# Patient Record
Sex: MALE | Race: WHITE | NOT HISPANIC OR LATINO | Employment: OTHER | URBAN - METROPOLITAN AREA
[De-identification: names, ages, dates, MRNs, and addresses within clinical notes are randomized per-mention and may not be internally consistent; named-entity substitution may affect disease eponyms.]

---

## 2017-10-06 ENCOUNTER — TRANSCRIBE ORDERS (OUTPATIENT)
Dept: ADMINISTRATIVE | Facility: HOSPITAL | Age: 57
End: 2017-10-06

## 2017-10-06 DIAGNOSIS — Z87.891 PERSONAL HISTORY OF TOBACCO USE, PRESENTING HAZARDS TO HEALTH: Primary | ICD-10-CM

## 2017-10-06 DIAGNOSIS — R19.09 GROIN MASS: Primary | ICD-10-CM

## 2017-10-16 ENCOUNTER — HOSPITAL ENCOUNTER (OUTPATIENT)
Dept: ULTRASOUND IMAGING | Facility: HOSPITAL | Age: 57
Discharge: HOME | End: 2017-10-16
Admitting: NURSE PRACTITIONER

## 2017-10-16 ENCOUNTER — HOSPITAL ENCOUNTER (OUTPATIENT)
Dept: CT IMAGING | Facility: HOSPITAL | Age: 57
Discharge: HOME | End: 2017-10-16

## 2017-10-16 DIAGNOSIS — Z87.891 PERSONAL HISTORY OF TOBACCO USE, PRESENTING HAZARDS TO HEALTH: ICD-10-CM

## 2017-10-16 DIAGNOSIS — R19.09 GROIN MASS: ICD-10-CM

## 2017-10-16 PROCEDURE — G0297 LDCT FOR LUNG CA SCREEN: HCPCS

## 2017-10-16 PROCEDURE — 76882 US LMTD JT/FCL EVL NVASC XTR: CPT

## 2017-12-14 ENCOUNTER — APPOINTMENT (OUTPATIENT)
Dept: PREADMISSION TESTING | Facility: HOSPITAL | Age: 57
End: 2017-12-14

## 2017-12-14 LAB
ANION GAP SERPL CALCULATED.3IONS-SCNC: 3 MMOL/L (ref 3–11)
BUN BLD-MCNC: 14 MG/DL (ref 9–23)
BUN/CREAT SERPL: 17.5 (ref 7–25)
CALCIUM SPEC-SCNC: 8.9 MG/DL (ref 8.7–10.4)
CHLORIDE SERPL-SCNC: 102 MMOL/L (ref 99–109)
CO2 SERPL-SCNC: 31 MMOL/L (ref 20–31)
CREAT BLD-MCNC: 0.8 MG/DL (ref 0.6–1.3)
DEPRECATED RDW RBC AUTO: 42.3 FL (ref 37–54)
ERYTHROCYTE [DISTWIDTH] IN BLOOD BY AUTOMATED COUNT: 13.7 % (ref 11.3–14.5)
GFR SERPL CREATININE-BSD FRML MDRD: 100 ML/MIN/1.73
GLUCOSE BLD-MCNC: 162 MG/DL (ref 70–100)
HCT VFR BLD AUTO: 42.1 % (ref 38.9–50.9)
HGB BLD-MCNC: 14.9 G/DL (ref 13.1–17.5)
MCH RBC QN AUTO: 30.4 PG (ref 27–31)
MCHC RBC AUTO-ENTMCNC: 35.4 G/DL (ref 32–36)
MCV RBC AUTO: 85.9 FL (ref 80–99)
PLATELET # BLD AUTO: 142 10*3/MM3 (ref 150–450)
PMV BLD AUTO: 10.6 FL (ref 6–12)
POTASSIUM BLD-SCNC: 4.2 MMOL/L (ref 3.5–5.5)
RBC # BLD AUTO: 4.9 10*6/MM3 (ref 4.2–5.76)
SODIUM BLD-SCNC: 136 MMOL/L (ref 132–146)
WBC NRBC COR # BLD: 8.23 10*3/MM3 (ref 3.5–10.8)

## 2017-12-14 PROCEDURE — 80048 BASIC METABOLIC PNL TOTAL CA: CPT | Performed by: SURGERY

## 2017-12-14 PROCEDURE — 93005 ELECTROCARDIOGRAM TRACING: CPT

## 2017-12-14 PROCEDURE — 36415 COLL VENOUS BLD VENIPUNCTURE: CPT

## 2017-12-14 PROCEDURE — 85027 COMPLETE CBC AUTOMATED: CPT | Performed by: SURGERY

## 2017-12-14 PROCEDURE — 93010 ELECTROCARDIOGRAM REPORT: CPT | Performed by: INTERNAL MEDICINE

## 2017-12-18 ENCOUNTER — LAB REQUISITION (OUTPATIENT)
Dept: LAB | Facility: HOSPITAL | Age: 57
End: 2017-12-18

## 2017-12-18 DIAGNOSIS — R22.42 LOCALIZED SWELLING, MASS AND LUMP, LEFT LOWER LIMB: ICD-10-CM

## 2017-12-18 PROCEDURE — 88341 IMHCHEM/IMCYTCHM EA ADD ANTB: CPT | Performed by: SURGERY

## 2017-12-18 PROCEDURE — 88304 TISSUE EXAM BY PATHOLOGIST: CPT | Performed by: SURGERY

## 2017-12-18 PROCEDURE — 88341 IMHCHEM/IMCYTCHM EA ADD ANTB: CPT

## 2017-12-18 PROCEDURE — 88342 IMHCHEM/IMCYTCHM 1ST ANTB: CPT

## 2017-12-18 PROCEDURE — 88342 IMHCHEM/IMCYTCHM 1ST ANTB: CPT | Performed by: SURGERY

## 2017-12-18 PROCEDURE — 88321 CONSLTJ&REPRT SLD PREP ELSWR: CPT

## 2017-12-18 PROCEDURE — 88323 CONSLTJ&REPRT MATRL PREP SLD: CPT

## 2017-12-29 PROCEDURE — 88321 CONSLTJ&REPRT SLD PREP ELSWR: CPT

## 2018-01-08 LAB
LAB AP CASE REPORT: NORMAL
LAB AP CLINICAL INFORMATION: NORMAL
Lab: NORMAL
PATH REPORT.FINAL DX SPEC: NORMAL
PATH REPORT.GROSS SPEC: NORMAL

## 2018-01-11 ENCOUNTER — TRANSCRIBE ORDERS (OUTPATIENT)
Dept: ADMINISTRATIVE | Facility: HOSPITAL | Age: 58
End: 2018-01-11

## 2018-01-11 DIAGNOSIS — C7B.1 MERKEL CELL CARCINOMA OF LYMPH NODE (HCC): Primary | ICD-10-CM

## 2018-01-11 DIAGNOSIS — C4A.9 MERKEL CELL CARCINOMA OF LYMPH NODE (HCC): Primary | ICD-10-CM

## 2018-01-17 ENCOUNTER — HOSPITAL ENCOUNTER (OUTPATIENT)
Dept: PET IMAGING | Facility: HOSPITAL | Age: 58
Discharge: HOME | End: 2018-01-17
Attending: SURGERY

## 2018-01-17 ENCOUNTER — HOSPITAL ENCOUNTER (OUTPATIENT)
Dept: PET IMAGING | Facility: HOSPITAL | Age: 58
Discharge: HOME | End: 2018-01-17
Attending: SURGERY | Admitting: SURGERY

## 2018-01-17 DIAGNOSIS — C4A.9 MERKEL CELL CARCINOMA OF LYMPH NODE (HCC): ICD-10-CM

## 2018-01-17 DIAGNOSIS — C7B.1 MERKEL CELL CARCINOMA OF LYMPH NODE (HCC): ICD-10-CM

## 2018-01-17 LAB — GLUCOSE BLDC GLUCOMTR-MCNC: 189 MG/DL (ref 70–130)

## 2018-01-17 PROCEDURE — 78816 PET IMAGE W/CT FULL BODY: CPT

## 2018-01-17 PROCEDURE — 0 FLUDEOXYGLUCOSE F18 SOLUTION: Performed by: SURGERY

## 2018-01-17 PROCEDURE — 82962 GLUCOSE BLOOD TEST: CPT

## 2018-01-17 PROCEDURE — A9552 F18 FDG: HCPCS | Performed by: SURGERY

## 2018-01-17 RX ADMIN — FLUDEOXYGLUCOSE F18 1 DOSE: 300 INJECTION INTRAVENOUS at 13:21

## 2018-01-24 ENCOUNTER — TRANSCRIBE ORDERS (OUTPATIENT)
Dept: ADMINISTRATIVE | Facility: HOSPITAL | Age: 58
End: 2018-01-24

## 2018-01-24 DIAGNOSIS — E05.90 HYPERTHYROIDISM: Primary | ICD-10-CM

## 2018-01-29 ENCOUNTER — HOSPITAL ENCOUNTER (OUTPATIENT)
Dept: ULTRASOUND IMAGING | Facility: HOSPITAL | Age: 58
Discharge: HOME | End: 2018-01-29
Attending: SURGERY | Admitting: SURGERY

## 2018-01-29 DIAGNOSIS — E05.90 HYPERTHYROIDISM: ICD-10-CM

## 2018-01-29 PROCEDURE — 76536 US EXAM OF HEAD AND NECK: CPT

## 2018-02-06 ENCOUNTER — EDUCATION (OUTPATIENT)
Dept: ONCOLOGY | Facility: HOSPITAL | Age: 58
End: 2018-02-06

## 2018-02-06 ENCOUNTER — CONSULT (OUTPATIENT)
Dept: ONCOLOGY | Facility: CLINIC | Age: 58
End: 2018-02-06

## 2018-02-06 VITALS
SYSTOLIC BLOOD PRESSURE: 125 MMHG | RESPIRATION RATE: 16 BRPM | OXYGEN SATURATION: 98 % | DIASTOLIC BLOOD PRESSURE: 71 MMHG | HEART RATE: 95 BPM | WEIGHT: 252.4 LBS | TEMPERATURE: 97.2 F | BODY MASS INDEX: 35.34 KG/M2 | HEIGHT: 71 IN

## 2018-02-06 DIAGNOSIS — C4A.9 MERKEL CELL CARCINOMA (HCC): Primary | ICD-10-CM

## 2018-02-06 PROCEDURE — 99205 OFFICE O/P NEW HI 60 MIN: CPT | Performed by: INTERNAL MEDICINE

## 2018-02-06 RX ORDER — AMLODIPINE BESYLATE AND BENAZEPRIL HYDROCHLORIDE 10; 40 MG/1; MG/1
1 CAPSULE ORAL DAILY
COMMUNITY

## 2018-02-06 RX ORDER — HYDROCHLOROTHIAZIDE 50 MG/1
50 TABLET ORAL DAILY
COMMUNITY
End: 2018-09-10

## 2018-02-06 RX ORDER — ONDANSETRON HYDROCHLORIDE 8 MG/1
8 TABLET, FILM COATED ORAL 3 TIMES DAILY PRN
Qty: 30 TABLET | Refills: 5 | Status: SHIPPED | OUTPATIENT
Start: 2018-02-06

## 2018-02-06 RX ORDER — MELATONIN
1000 DAILY
COMMUNITY

## 2018-02-06 RX ORDER — CLONAZEPAM 1 MG/1
1 TABLET ORAL 2 TIMES DAILY PRN
COMMUNITY

## 2018-02-06 RX ORDER — ATORVASTATIN CALCIUM 40 MG/1
40 TABLET, FILM COATED ORAL DAILY
COMMUNITY

## 2018-02-06 RX ORDER — DEXAMETHASONE 4 MG/1
TABLET ORAL
Qty: 30 TABLET | Refills: 3 | Status: SHIPPED | OUTPATIENT
Start: 2018-02-06 | End: 2018-05-21

## 2018-02-06 NOTE — PROGRESS NOTES
DATE OF CONSULTATION: 2/6/2018    REFERRING PHYSICIAN: Matthew Sanchez MD    Dear Dr. Matthew Sanchez MD  Thank you for asking for my medical advice on this patient. I saw him in the  [facility] office on 2/6/2018    REASON FOR CONSULTATION: New diagnosis of Merkel cell carcinoma.     HISTORY OF PRESENT ILLNESS: The patient is a very pleasant 57 y.o.  male   who was in his usual state of health until approximately 6 months ago. The patient presented developed a mass to his left groin that increased in size from August 2017. He saw Dr. Sanchez who was concerned about the presence of lymphadenopathy. He referred the patient to Dr. Arrieta who completed excision on 12/18/2017. Final pathology revealed Merkel cell carcinoma. The patient had full body skin exam with Dr. Colmenares that failed to show any primary lesions. He had whole body PET scan completed 1/17/2018 that failed to show evidence of metastatic disease. There was some hypermetabolic activity in his thyroid, however follow up ultrasound showed benign appearing nodularity. Aside from his left groin mass, the patient has been asymptomatic. He denies new pain, skin changes, or weight loss. Based on his new diagnosis of Merkel cell carcinoma, he was referred to our office for further evaluation.     SUBJECTIVE: When I saw the patient today he is doing fairly well. He complains of some intermittent and  Mild lymphedema since undergoing his surgery in December. His incision has healed well. He is eating and drinking well. He denies new cough or shortness of breath. He is somewhat anxious regarding his new diagnosis of cancer.     Review of Systems   Constitutional: Negative for activity change, appetite change, chills, fatigue, fever and unexpected weight change.   HENT: Negative for hearing loss, mouth sores, nosebleeds, sore throat and trouble swallowing.    Eyes: Negative for visual disturbance.   Respiratory: Negative for cough, chest tightness,  shortness of breath and wheezing.    Cardiovascular: Positive for leg swelling. Negative for chest pain and palpitations.        Lymphedema to left leg   Gastrointestinal: Negative for abdominal distention, abdominal pain, blood in stool, constipation, diarrhea, nausea, rectal pain and vomiting.   Endocrine: Negative for cold intolerance and heat intolerance.   Genitourinary: Negative for difficulty urinating, dysuria, frequency and urgency.   Musculoskeletal: Positive for back pain. Negative for arthralgias, gait problem, joint swelling and myalgias.   Skin: Negative for rash.   Neurological: Negative for dizziness, tremors, syncope, weakness, light-headedness, numbness and headaches.   Hematological: Negative for adenopathy. Does not bruise/bleed easily.   Psychiatric/Behavioral: Negative for confusion, sleep disturbance and suicidal ideas. The patient is not nervous/anxious.        No past medical history on file.    Social History     Social History   • Marital status: Single     Spouse name: N/A   • Number of children: N/A   • Years of education: N/A     Occupational History   • Not on file.     Social History Main Topics   • Smoking status: Not on file   • Smokeless tobacco: Not on file   • Alcohol use Not on file   • Drug use: Not on file   • Sexual activity: Not on file     Other Topics Concern   • Not on file     Social History Narrative       No family history on file.    No past surgical history on file.    Allergies not on file       Current Outpatient Prescriptions:   •  oxyCODONE-acetaminophen (PERCOCET) 5-325 MG per tablet, Take 1-2 tablets by mouth Every 4 to 6 Hours As Needed., Disp: 20 tablet, Rfl: 0    PHYSICAL EXAMINATION:   There were no vitals taken for this visit.   ECOG Performance Status: 1 - Symptomatic but completely ambulatory  General Appearance:  alert, cooperative, no apparent distress and appears stated age   Neurologic/Psychiatric: A&O x 3, gait steady, appropriate affect, strength  5/5 in all muscle groups   HEENT:  Normocephalic, without obvious abnormality, mucous membranes moist   Neck: Supple, symmetrical, trachea midline, no adenopathy;  No thyromegaly, masses, or tenderness   Lungs:   Clear to auscultation bilaterally; respirations regular, even, and unlabored bilaterally   Heart:  Regular rate and rhythm, no murmurs appreciated   Abdomen:   Soft, non-tender, non-distended and no organomegaly   Lymph nodes: No cervical, supraclavicular, inguinal or axillary adenopathy noted   Extremities: Normal, atraumatic; no clubbing, cyanosis, or edema    Skin: No rashes, ulcers, or suspicious lesions noted       No visits with results within 2 Week(s) from this visit.  Latest known visit with results is:    Hospital Outpatient Visit on 01/17/2018   Component Date Value Ref Range Status   • Glucose 01/17/2018 189* 70 - 130 mg/dL Final        Us Thyroid    Result Date: 1/31/2018  Narrative: EXAMINATION: US THYROID-  INDICATION: Hyperthyroidism; E05.90-Thyrotoxicosis, unspecified without thyrotoxic crisis or storm.  COMPARISON: 03/02/2012 thyroid ultrasound and earlier ultrasounds. Whole body PET scan 01/17/2018.  FINDINGS: Patient history indicates recent diagnosis of Merkel cell cancer diagnosed via left groin lymph node biopsy.  Thyroid is diffusely enlarged, and consists of multiple fairly bland and uniform nodular areas, with no evidence of microcalcification, or significant color Doppler flow. There is a very dense large dorsal midpole calcification the right, 12 mm in diameter.  Overall dimensions of the thyroid are approximate 7.4 x 2.9 x 3.8 cm on the right, 7.4 x 3.4 x 3.5 cm on the left, and enlargement of the thyroid isthmus to 12 mm in width.  Essentially all portions of the thyroid appear nodular, similar to the 2012 exam, with no particular area appearing distinct or unusual compared to the remainder of the gland. The sonographer notes the largest nodular area on the right, is roughly  4.3 x 2.4 x 3.2 cm and the largest nodular area on the left in the lower pole, roughly 4.6 x 3.3 x 2.6 cm.  Dimensions of the thyroid are similar to the prior exam, the right thyroid lobe measuring 7.6 x 2.8 x 3.4 cm in 2012, and the left lobe measuring 7.7 x 3.4 x 2.3 cm at that time. Pattern of overall nodularity is similar to today's exam, although the individual nodular areas are less well seen in 2012 due to previous degeneration ultrasound equipment.      Impression: Markedly enlarged and diffusely nodular thyroid gland, similar to previous 03/02/2012 exam. Nodular areas are fairly uniform and bland, with no particular asymmetric or unusual nodule. Please see above discussion.   D:  01/30/2018 E:  01/31/2018  This report was finalized on 1/31/2018 9:54 PM by DR. Mushtaq Uriarte MD.      Nm Pet Whole Body    Result Date: 1/17/2018  Narrative: EXAMINATION: NM PET, WHOLE BODY-01/17/2018:  INDICATION: C7B.1  MERKEL CELL CARCINOMA; C7B.1-Secondary Merkel cell carcinoma; C4A.9-Merkel cell carcinoma, unspecified, status post malignant node removed from the left groin which is positive for Merkel cell malignancy, staging.  TECHNIQUE:  With a fasting baseline blood glucose level of 189, 12.27 mCi of 18 FDG was administered intravenously.  Approximately one hour after the administration of radiotracer, a total body fusion PET CT data set was performed imaging the patient from the vertex of the skull to the feet.  The radiation dose reduction device was turned on as low as reasonably achievable for each scan per ALARA protocol.  COMPARISON:  No comparison images are available.  FINDINGS: 1. There is a small hematoma in the area of the patient's recent left inguinal node resection. Surgical clips are noted around the area of node resection. There is no evidence of hypermetabolic activity at the surgical site in the left groin.  Further, small normal lymph nodes are seen in both the left and right groin areas, none of which  exhibit hypermetabolic activity.  2. The thyroid gland is diffusely enlarged and is inhomogeneous in attenuation on the correlative CT data set. The thyroid lobes are diffusely hypermetabolic. The right lobe of the thyroid exhibits a maximum SUV average value of 4.44 and the left lobe of the thyroid demonstrates a maximal SUV value of 4.32. An outstanding dominant focally hot or FDG avid mass is not identified otherwise within the thyroid. There is no perithyroidal adenopathy or other hypermetabolic disease in the neck.  3. The neck, chest, mediastinum and lungs are normal. There is no hypermetabolic adenopathy or pulmonary nodules noted.  4. Data sets through the abdomen to include liver, retroperitoneum and retrocrural space, elizabeth pathways, mesentery and omentum are negative. The patient does have bilateral adrenal hyperplasia, however, there is no pathologic hypermetabolic activity in the mildly enlarged symmetrical adrenal glands.  5. Images through the lower abdomen, pelvis, inguinal areas, deep internal iliac chains and lower extremities to the feet are negative. A dominant focus of hypermetabolic activity is not currently identified.      Impression: 1.  The thyroid is diffusely enlarged and is moderately hypermetabolic diffusely without a focal dominant area. Nonetheless, this is considerable thyromegaly. 2.  Otherwise, the PET CT data sets are negative elsewhere. There is no pathologic adenopathy, nodule within the chest, evidence of adenopathy or retroperitoneal mass in the abdomen, pelvis or retroperitoneum, and there is no evidence of residual hypermetabolic abnormality in the area of the recent inguinal node resection. 3.  Therefore, there is hypermetabolic activity diffusely in the enlarged thyroid but no pathologic focus of hypermetabolic activity is seen elsewhere.  D:  01/17/2018 E:  01/17/2018     This report was finalized on 1/17/2018 5:04 PM by Dr. Robert Sal MD.          DIAGNOSTIC DATA:    1. Radiology: 1/17/2018 PET scan impression:  1.  The thyroid is diffusely enlarged and is moderately hypermetabolic diffusely without a focal dominant area. Nonetheless, this is considerable thyromegaly. 2.  Otherwise, the PET CT data sets are negative elsewhere. There is no pathologic adenopathy, nodule within the chest, evidence of adenopathy or retroperitoneal mass in the abdomen, pelvis or retroperitoneum, and there is no evidence of residual hypermetabolic abnormality in the area of the recent inguinal node resection. 3.  Therefore, there is hypermetabolic activity diffusely in the enlarged thyroid but no pathologic focus of hypermetabolic activity is seen elsewhere.  1/24/2018 Thyroid ultrasound impression: Markedly enlarged and diffusely nodular thyroid gland, similar to previous 03/02/2012 exam. Nodular areas are fairly uniform  and bland, with no particular asymmetric or unusual nodule.  2. Dr. Arrieta's note from 1/23/2018 reviewed by me and documented in the  chart.   3. Pathology report: 12/18/2017- left thigh mass excision- Merkel cell carcinoma.   4. Laboratory data: 12/14/2017 WBC 8.23, Hgb 14.9, Hct 42.1, Plt 142, Crt 0.8, BUN 14, K+ 4.2, Na+ 136, calcium 8.9.      ASSESSMENT: The patient is a very pleasant 57 y.o.  male  with Merkel cell carcinoma.     PROBLEM LIST:   1. Merkel cell carcinoma.   A. Presented with left groin mass.   B. Status post left groin excision of 5 cm mass.   C. Final pathology revealed Merkel cell carcinoma.   D. PET scan for clinical staging done 1/17/2018 failed to show evidence of metastatic disease.   E. TxN1M0 disease.   2. History of basal cell carcinoma of the skin.   3. Type 2 diabetes.   4. Hypertension.    PLAN:   1. I had a long discussion today with the patient and his sister about his  new diagnosis of Merkel cell carcinoma. I did go over the final pathology report in  detail with both of them. I reviewed the patient's documents including referring provider's  notes, lab results, imaging, and path report.   2. I reviewed with him the NCCN guidelines that show some added benefit to completing adjuvant chemotherapy and radiation. The patient is young, has good performance status, and had a large mass at presentation, and therefore I think it would benefit him to be aggressive in his treatment.   3. I will refer him to Dr. Gay Carlos for consultation regarding adjuvant radiation herapy. I have discussed his case over the phone with her in detail for treatment planning.  This will be consolidative short course after completion of chemotherapy.  This is in compliance with NCCN guidelines.  4. Regarding chemotherapy, I recommended the patient be treated using cisplatin/etoposide given IV every 3 weeks for 4 cycles.  This is in compliance with NCCN guidelines.  5. I reviewed the potential side effects of this treatment including hair loss, nausea, vomiting, fatigue, bone marrow suppression, infection, allergic reaction to his regimen, and even death. The patient received formal education from our oncology pharmacist regarding his treatment today.   6. He was given a prescription for Decadron to be taken with each cycle of chemotherapy.   7. He was also given a prescription for Zofran to be used as needed for chemotherapy induced nausea.   8. We will consider adding Neulasta in the future if needed for significant neutropenia.   9. We will plan to start treatment in 1 week, and will see him back for cycle #2 of treatment.   10. The patient will continue surgical follow up with Dr. Arrieta for left groin excision.   11.  I will monitor patient blood pressure while he is on chemotherapy since it might fluctuate.  12.  I will monitor patient blood sugars while he is on chemotherapy since he would be receiving steroids as part of his antiemetic treatment.  13.  Future cancer treatment options would include immunotherapy.  Scribed for Anastasia Waller MD by RITA Beard.  2/6/2018  3:08 PM  Anastasia Waller MD  2/6/2018   I, Anasatsia Waller MD, personally performed the services described in this documentation as scribed by the above named individual in my presence, and it is both accurate and complete.  2/6/2018  4:04 PM

## 2018-02-06 NOTE — PLAN OF CARE
Outpatient Infusion • [address] [facility] • [address] • [place] [id number] • [phone]      CHEMOTHERAPY EDUCATION SHEET    NAME:  Regino Souza      : 1960           DATE: 18    Booklets Given: Chemotherapy and You [x]  Eating Hints [x]    Sexuality/Fertility Books []     Chemotherapy/Biotherapy Education Sheets: (list all that apply)  Cisplatin and Etoposide                                                                                                                                                                Chemotherapy Regimen:  Cisplatin and Etoposide infusions, days 1-3, every 21 days    TOPICS EDUCATION PROVIDED EDUCATION REINFORCED COMMENTS   ANEMIA:  role of RBC, cause, s/s, ways to manage, role of transfusion [x] [] Explained the MOA of chemo induced anemia and the signs and symptoms to be aware of.  Also encouraged patient to rest and ensure appropriate caloric intake to maintain energy.   THROMBOCYTOPENIA:  role of platelet, cause, s/s, ways to prevent bleeding, things to avoid, when to seek help [x] [] Explained the MOA of chemo induced thrombocytopenia and the signs and symptoms to be aware of.   NEUTROPENIA:  role of WBC, cause, infection precautions, s/s of infection, when to call MD [] [] Explained the MOA of chemo induced neutropenia, why it is a concern, and what symptoms to look for.  Instructed patient to call seek medical attention for a fever of 100.4F of over an hour.   NUTRITION & APPETITE CHANGES:  importance of maintaining healthy diet & weight, ways to manage to improve intake, dietary consult, exercise regimen [x] [] Emphasized importance of good nutrition and maintaining appropriate caloric intake.   DIARRHEA:  causes, s/s of dehydration, ways to manage, dietary changes, when to call MD [x] [] Explained risk of diarrhea and when to call PCP if it is uncontrolled.   CONSTIPATION:  causes, ways to manage, dietary changes, when to call MD [x] [] Advised pt  on the potential for constipation and importance of staying hydrated to help avoid.   NAUSEA & VOMITING:  cause, use of antiemetics, dietary changes, when to call MD [x] [] Alerted pt to emetogenic risk of chemo and advised patient to take the antiemetic medications that will be provided.   MOUTH SORES:  causes, oral care, ways to manage [x] [] Advised on the potential for mouth sores and ways to prevent (avoiding sharp foods and a water, baking soda, and salt mouthwash).   ALOPECIA:  cause, ways to manage, resources [x] [] Explained the risk for alopecia and what to expect.   INFERTILITY & SEXUALITY:  causes, fertility preservation options, sexuality changes, ways to manage, importance of birth control [x] [] Explained the importance for barrier contraception if engaging in intercourse.   NERVOUS SYSTEM CHANGES:  causes, s/s, neuropathies, cognitive changes, ways to manage [] []    PAIN:  causes, ways to manage [] [] ????   SKIN & NAIL CHANGES:  cause, s/s, ways to manage [x] [] Advised patient on the potential for nail changes and to keep nails trimmed to avoid breakage.   ORGAN TOXICITIES:  cause, s/s, need for diagnostic tests, labs, when to notify MD [x] [] Outlined the risk for toxicities and the importance of staying well hydrated to help prevent kidney injury.   SURVIVORSHIP:  distress, distress assessment, secondary malignancies, early/late effects, follow-up, social issues, social support [] []    HOME CARE:  use of spill kits, storing of PO chemo, how to manage bodily fluids [] []    MISCELLANEOUS:  drug interactions, administration, vesicant, et [x] [] Outlined what to expect on day of infusion.     Referrals:        Notes:   Provided contact information and instructed patient to call with any questions

## 2018-02-09 ENCOUNTER — SPECIALTY PHARMACY (OUTPATIENT)
Dept: ONCOLOGY | Facility: HOSPITAL | Age: 58
End: 2018-02-09

## 2018-02-09 DIAGNOSIS — C4A.9 MERKEL CELL CARCINOMA (HCC): Primary | ICD-10-CM

## 2018-02-09 RX ORDER — ETOPOSIDE 50 MG/1
200 CAPSULE ORAL DAILY
Qty: 27 CAPSULE | Refills: 3 | Status: SHIPPED | OUTPATIENT
Start: 2018-02-09 | End: 2018-05-21

## 2018-02-09 NOTE — PROGRESS NOTES
Given IV etoposide shortage, plan to transition new starts to oral etoposide regimen. Given oral etoposide bioavailability and reports in the literature Zaheer DH, Gloria CHRIS, Sheldon JH, Ezekiel GH, Alem GJ, Ector J, et al. A randomized trial to compare intravenous and oral etoposide in combination with cisplatin for the treatment of small cell lung cancer. Cancer. 1991;67:245-9. Will plan to administer ORAL ETOPOSIDE 200mg/m2 on Day1-3 of each 21 day CYCLE. Given daily doses > 200 mg, will divide daily dose over two doses.      Submit script for ETOPOSIDE 450mg PO daily on Days 1-3 of each 21 day cycle - of note, will instruct patient to take medication as split dose Etoposide 200mg (4 capsules) PO QAM and (5 capsules) 250mg QPM on DAY 1-3 of 21 day cycle.    Patient has FEP insurance, thus required to fill through FEP program [phone]. However, upon calling to provide verbal order for URGENT etoposide. FEP program is out of stock and thus, given national shortage completing override such that script can be filled at outside pharmacy     2/12/18: Called and alerted patient that IV etoposide would be transitioned to ORAL etoposide, given processing clinic will be contacting patient to reschedule infusion appt to ensure oral etoposide if dispensed to patient to start with Cycle 1. Pt verbalized understanding.

## 2018-02-13 ENCOUNTER — APPOINTMENT (OUTPATIENT)
Dept: ONCOLOGY | Facility: HOSPITAL | Age: 58
End: 2018-02-13

## 2018-02-13 ENCOUNTER — SPECIALTY PHARMACY (OUTPATIENT)
Dept: ONCOLOGY | Facility: HOSPITAL | Age: 58
End: 2018-02-13

## 2018-02-13 DIAGNOSIS — C4A.9 MERKEL CELL CARCINOMA (HCC): ICD-10-CM

## 2018-02-13 RX ORDER — PALONOSETRON 0.05 MG/ML
0.25 INJECTION, SOLUTION INTRAVENOUS ONCE
Status: CANCELLED | OUTPATIENT
Start: 2018-02-13

## 2018-02-13 RX ORDER — SODIUM CHLORIDE 9 MG/ML
250 INJECTION, SOLUTION INTRAVENOUS ONCE
Status: CANCELLED | OUTPATIENT
Start: 2018-02-13

## 2018-02-13 NOTE — PROGRESS NOTES
Oral Chemotherapy Teaching      Patient Name/:  Regino Souza   1960  Oral Chemotherapy Regimen: Etoposide 200mg PO QAM + 250mg PO QPM on Days 1-3 + IV Carboplatin on Day 1 of 21 day treatment cycle  Date Started Medication:   Cycle 1: 2/15/18    Initial Teaching Follow Up Comments     Safety     Storage instructions (away from children; away from heat/cold, sunlight, or moisture), handling - use of gloves (caregivers), washing hands after touching pills, managing waste     “How are you storing your medications?”, reminders on storage, proper handling (caregivers using gloves, washing hands, away from children, managing waste, etc.), disposal of medication with D/C or dosage change    Discussed appropriate storage and handling of hazardous medication.  Wash hands after handling.      Adherence      patient and/or caregiver on how to take medication, take with/without food, assess their adherence potential, stress importance of adherence, ways to manage adherence (pill boxes, phone reminders, calendars), what to do if miss a dose   “How are you taking your medication?” “How are you remembering to take your medication?”, “How many doses have you missed?”, determine reasons for non-adherence (not remembering, side effects, etc), ways to improve, overadherence? Remind patient of ways to improve/maintain adherence   Etoposide to be administered on an empty stomach (no food 2 hours prior or 1 hour after administration). Plan to take oral etoposide on Day 1-3 of each cycle. Pt aware to split dose - Administer Etoposide 200mg (4 caps) by mouth in the morning and 250mg (5 caps) po in the evening. Discussed use of zofran 30 minutes prior to etoposide administration. Pt aware. Medication processed at [facility]. Set up for delivery on 18. Pt aware to take first dose in AM on 2/15/18. Will plan to discuss Dexamethasone and Zofran use with patient in infusion room on 2/15/18 with first infusion.      Side  Effects/Adverse Reactions      patient on potential side effects, s/s, ways to manage, when to call MD/seek help     Determine if patient experiencing side effects, ways to manage  Plan to further review side effects in infusion, including but not limited to: myelosuppression, mucositis, N/V, alopecia, and rash.     Miscellaneous     Food interactions, DDIs, financial issues Determine if patient started any new medications since being placed on oral chemo (analyze for DDI) NO DDIs identified with active medication list and etoposide.      Additional Notes:Discussed aforementioned material with patient over the phone. Patient aware to call with additional questions or concerns. Scheduled for delivery of etoposide for 2/14/18. Will plan to further discuss administration, side effects, and storage/handling with patient on first day of infusion scheduled 2/15/18.

## 2018-02-14 ENCOUNTER — APPOINTMENT (OUTPATIENT)
Dept: ONCOLOGY | Facility: HOSPITAL | Age: 58
End: 2018-02-14

## 2018-02-15 ENCOUNTER — DOCUMENTATION (OUTPATIENT)
Dept: NUTRITION | Facility: HOSPITAL | Age: 58
End: 2018-02-15

## 2018-02-15 ENCOUNTER — DOCUMENTATION (OUTPATIENT)
Dept: SOCIAL WORK | Facility: HOSPITAL | Age: 58
End: 2018-02-15

## 2018-02-15 ENCOUNTER — SPECIALTY PHARMACY (OUTPATIENT)
Dept: ONCOLOGY | Facility: HOSPITAL | Age: 58
End: 2018-02-15

## 2018-02-15 ENCOUNTER — INFUSION (OUTPATIENT)
Dept: ONCOLOGY | Facility: HOSPITAL | Age: 58
End: 2018-02-15

## 2018-02-15 ENCOUNTER — DOCUMENTATION (OUTPATIENT)
Dept: ONCOLOGY | Facility: CLINIC | Age: 58
End: 2018-02-15

## 2018-02-15 ENCOUNTER — APPOINTMENT (OUTPATIENT)
Dept: ONCOLOGY | Facility: HOSPITAL | Age: 58
End: 2018-02-15

## 2018-02-15 VITALS
HEIGHT: 71 IN | HEART RATE: 101 BPM | BODY MASS INDEX: 34.44 KG/M2 | SYSTOLIC BLOOD PRESSURE: 124 MMHG | TEMPERATURE: 98 F | WEIGHT: 246 LBS | DIASTOLIC BLOOD PRESSURE: 69 MMHG | RESPIRATION RATE: 16 BRPM

## 2018-02-15 DIAGNOSIS — C4A.9 MERKEL CELL CARCINOMA (HCC): Primary | ICD-10-CM

## 2018-02-15 LAB
ALBUMIN SERPL-MCNC: 4.3 G/DL (ref 3.2–4.8)
ALBUMIN/GLOB SERPL: 1.7 G/DL (ref 1.5–2.5)
ALP SERPL-CCNC: 66 U/L (ref 25–100)
ALT SERPL W P-5'-P-CCNC: 18 U/L (ref 7–40)
ANION GAP SERPL CALCULATED.3IONS-SCNC: 8 MMOL/L (ref 3–11)
AST SERPL-CCNC: 14 U/L (ref 0–33)
BILIRUB SERPL-MCNC: 1.5 MG/DL (ref 0.3–1.2)
BUN BLD-MCNC: 15 MG/DL (ref 9–23)
BUN/CREAT SERPL: 18.8 (ref 7–25)
CALCIUM SPEC-SCNC: 8.9 MG/DL (ref 8.7–10.4)
CHLORIDE SERPL-SCNC: 99 MMOL/L (ref 99–109)
CO2 SERPL-SCNC: 30 MMOL/L (ref 20–31)
CREAT BLD-MCNC: 0.8 MG/DL (ref 0.6–1.3)
ERYTHROCYTE [DISTWIDTH] IN BLOOD BY AUTOMATED COUNT: 13.9 % (ref 11.3–14.5)
GFR SERPL CREATININE-BSD FRML MDRD: 100 ML/MIN/1.73
GLOBULIN UR ELPH-MCNC: 2.6 GM/DL
GLUCOSE BLD-MCNC: 189 MG/DL (ref 70–100)
HCT VFR BLD AUTO: 42.9 % (ref 38.9–50.9)
HGB BLD-MCNC: 14 G/DL (ref 13.1–17.5)
LYMPHOCYTES # BLD AUTO: 1.1 10*3/MM3 (ref 0.6–4.8)
LYMPHOCYTES NFR BLD AUTO: 10.9 % (ref 24–44)
MAGNESIUM SERPL-MCNC: 1.7 MG/DL (ref 1.3–2.7)
MCH RBC QN AUTO: 29.1 PG (ref 27–31)
MCHC RBC AUTO-ENTMCNC: 32.7 G/DL (ref 32–36)
MCV RBC AUTO: 89.1 FL (ref 80–99)
MONOCYTES # BLD AUTO: 0.3 10*3/MM3 (ref 0–1)
MONOCYTES NFR BLD AUTO: 3.3 % (ref 0–12)
NEUTROPHILS # BLD AUTO: 9 10*3/MM3 (ref 1.5–8.3)
NEUTROPHILS NFR BLD AUTO: 85.8 % (ref 41–71)
PLATELET # BLD AUTO: 175 10*3/MM3 (ref 150–450)
PMV BLD AUTO: 8.3 FL (ref 6–12)
POTASSIUM BLD-SCNC: 3.5 MMOL/L (ref 3.5–5.5)
PROT SERPL-MCNC: 6.9 G/DL (ref 5.7–8.2)
RBC # BLD AUTO: 4.81 10*6/MM3 (ref 4.2–5.76)
SODIUM BLD-SCNC: 137 MMOL/L (ref 132–146)
WBC NRBC COR # BLD: 10.5 10*3/MM3 (ref 3.5–10.8)

## 2018-02-15 PROCEDURE — 96375 TX/PRO/DX INJ NEW DRUG ADDON: CPT

## 2018-02-15 PROCEDURE — 96368 THER/DIAG CONCURRENT INF: CPT

## 2018-02-15 PROCEDURE — 96365 THER/PROPH/DIAG IV INF INIT: CPT

## 2018-02-15 PROCEDURE — 85025 COMPLETE CBC W/AUTO DIFF WBC: CPT

## 2018-02-15 PROCEDURE — 25010000002 DEXAMETHASONE PER 1 MG: Performed by: NURSE PRACTITIONER

## 2018-02-15 PROCEDURE — 96366 THER/PROPH/DIAG IV INF ADDON: CPT

## 2018-02-15 PROCEDURE — 80053 COMPREHEN METABOLIC PANEL: CPT

## 2018-02-15 PROCEDURE — 36415 COLL VENOUS BLD VENIPUNCTURE: CPT

## 2018-02-15 PROCEDURE — 25010000002 CISPLATIN PER 50 MG: Performed by: NURSE PRACTITIONER

## 2018-02-15 PROCEDURE — 96413 CHEMO IV INFUSION 1 HR: CPT

## 2018-02-15 PROCEDURE — 96374 THER/PROPH/DIAG INJ IV PUSH: CPT

## 2018-02-15 PROCEDURE — 96367 TX/PROPH/DG ADDL SEQ IV INF: CPT

## 2018-02-15 PROCEDURE — 25010000002 POTASSIUM CHLORIDE PER 2 MEQ OF POTASSIUM: Performed by: NURSE PRACTITIONER

## 2018-02-15 PROCEDURE — 25010000002 PALONOSETRON PER 25 MCG: Performed by: NURSE PRACTITIONER

## 2018-02-15 PROCEDURE — 25010000002 FOSAPREPITANT PER 1 MG: Performed by: NURSE PRACTITIONER

## 2018-02-15 PROCEDURE — 83735 ASSAY OF MAGNESIUM: CPT

## 2018-02-15 PROCEDURE — 25010000002 MAGNESIUM SULFATE PER 500 MG OF MAGNESIUM: Performed by: NURSE PRACTITIONER

## 2018-02-15 RX ORDER — PALONOSETRON 0.05 MG/ML
0.25 INJECTION, SOLUTION INTRAVENOUS ONCE
Status: COMPLETED | OUTPATIENT
Start: 2018-02-15 | End: 2018-02-15

## 2018-02-15 RX ORDER — SODIUM CHLORIDE 9 MG/ML
250 INJECTION, SOLUTION INTRAVENOUS ONCE
Status: COMPLETED | OUTPATIENT
Start: 2018-02-15 | End: 2018-02-15

## 2018-02-15 RX ADMIN — CISPLATIN 174 MG: 1 INJECTION, SOLUTION INTRAVENOUS at 13:26

## 2018-02-15 RX ADMIN — PALONOSETRON HYDROCHLORIDE 0.25 MG: 0.25 INJECTION INTRAVENOUS at 12:05

## 2018-02-15 RX ADMIN — POTASSIUM CHLORIDE 1000 ML: 2 INJECTION, SOLUTION, CONCENTRATE INTRAVENOUS at 10:02

## 2018-02-15 RX ADMIN — DEXAMETHASONE SODIUM PHOSPHATE 12 MG: 4 INJECTION, SOLUTION INTRAMUSCULAR; INTRAVENOUS at 12:06

## 2018-02-15 RX ADMIN — SODIUM CHLORIDE 250 ML: 9 INJECTION, SOLUTION INTRAVENOUS at 09:58

## 2018-02-15 RX ADMIN — POTASSIUM CHLORIDE 1000 ML: 2 INJECTION, SOLUTION, CONCENTRATE INTRAVENOUS at 14:33

## 2018-02-15 RX ADMIN — SODIUM CHLORIDE 150 MG: 9 INJECTION, SOLUTION INTRAVENOUS at 12:13

## 2018-02-15 NOTE — PROGRESS NOTES
SW met with pt during his first infusion to provide support and resources.  Pt states that he is doing well today and everything has gone well up to this point.  SW provided support to pt and educated him on the role of the oncology social worker.  SW provided contact information to pt and encouraged him to call with any future needs or concerns.  SW available for ongoing support and resource needs.

## 2018-02-15 NOTE — PROGRESS NOTES
Oncology Nutrition Screening    Patient Name:  Regino Souza  YOB: 1960  MRN: [id number]  Date:  02/15/18  Physician:  Dr. Waller    Type of Cancer Treatment:   Radiation/Cyberknife: consult pending  Chemotherapy:  Cisplatin / Etoposide (po D1-3) - every 21 days x 4 cycles    Patient Active Problem List   Diagnosis   • Merkel cell carcinoma       Current Outpatient Prescriptions   Medication Sig Dispense Refill   • amLODIPine-benazepril (LOTREL) 10-40 MG per capsule Take 1 capsule by mouth Daily.     • aspirin 81 MG tablet Take 81 mg by mouth Daily.     • atorvastatin (LIPITOR) 40 MG tablet Take 40 mg by mouth Daily.     • cholecalciferol (VITAMIN D3) 1000 units tablet Take 1,000 Units by mouth Daily.     • clonazePAM (KlonoPIN) 1 MG tablet Take 1 mg by mouth 2 (Two) Times a Day As Needed for Anxiety or Seizures.     • dexamethasone (DECADRON) 4 MG tablet Take 2 tablets in the morning the day after chemo (Day 2), then take 2 tablets twice daily on days 3 & 4.  Take with food. 30 tablet 3   • Dulaglutide (TRULICITY) 1.5 MG/0.5ML solution pen-injector Inject 1 dose under the skin 1 (One) Time Per Week.     • etoposide (TOPOSAR;VEPESID) 50 MG chemo capsule Take 9 capsules by mouth Daily. On DAY 1-3 of each 21 DAY cycle of chemotherapy 27 capsule 3   • hydrochlorothiazide (HYDRODIURIL) 50 MG tablet Take 50 mg by mouth Daily.     • metFORMIN (GLUCOPHAGE) 1000 MG tablet Take 1,000 mg by mouth 2 (Two) Times a Day With Meals.     • Multiple Vitamins-Minerals (MULTIVITAL PO) Take 1 tablet by mouth Daily.     • ondansetron (ZOFRAN) 8 MG tablet Take 1 tablet by mouth 3 (Three) Times a Day As Needed for Nausea or Vomiting. 30 tablet 5   • oxyCODONE-acetaminophen (PERCOCET) 5-325 MG per tablet Take 1-2 tablets by mouth Every 4 to 6 Hours As Needed. 20 tablet 0     No current facility-administered medications for this visit.      Facility-Administered Medications Ordered in Other Visits   Medication Dose Route  "Frequency Provider Last Rate Last Dose   • sodium chloride 0.9 % 1,000 mL with potassium chloride 20 mEq, magnesium sulfate 1 g infusion  1,000 mL Intravenous Once Nori Norton RITA   1,000 mL at 02/15/18 1433       Glycemic Risk:   NIDDM, Africaiods    Weight:   Height: 71 inches  Weight: 246 lbs.  Usual Body Weight: ~250 lbs.   BMI: 34.3  Obese  Weight - no significant changes    Oral Food Intake:  Regular Diet - No Restrictions    Hydration Status:   How many 8 ounce glass of water of fluid do you drink per day?  Typically drinks Dt. Mt. Dew but is trying to drink more water    Enteral Feeding:   n/a    Nutrition Symptoms:   No Problems with Eating    Activity:   Not assessed at this time.     reports that he quit smoking about 13 years ago. His smoking use included Cigarettes. He smoked 2.00 packs per day. He has never used smokeless tobacco. He reports that he drinks about 1.2 oz of alcohol per week  He reports that he does not use illicit drugs.    Evaluation of Nutritional Risk:   Patient has been identified at nutritional risk due to diagnosis, treatment plan, and glycemic risk.  Nutritional screening completed and confirmed by patient as above.  Patient states he does not check his blood sugars at home.  Provided and reviewed written diet material \"Blood Glucose Management\"; discussed diabetes basics; and encouraged him to monitor his blood sugars at home.    Discussed the importance of good nutrition during his treatment course focusing on adequate calorie, protein, nutrient and fluid intake.  Advised him to be consuming smaller more frequent meals/snacks throughout the day to aid with nausea management.  Emphasized the importance of protein and its role in the diet; reviewed high protein foods; and recommended he have a protein source at each meal/snack.  Also emphasized the importance of hydration; reviewed good hydrating fluid options; and recommended he drink at least 64 ounces daily.  Discussed " nutritional supplements and their role in the diet; he reports having Premier drinks at home-encouraged him to drink them as needed.  Briefly discussed the ACS nutrition booklet and suggested he use it for symptom management as needed.    Answered his questions and he voiced understanding of information discussed.  RD's contact information provided and encouraged him to call if further questions arise.  Will follow up as indicated.  RD available to assist prn.     Electronically signed by:  Marina Burger RD  3:36 PM

## 2018-02-15 NOTE — PROGRESS NOTES
Oral Chemotherapy Teaching      Patient Name/:  Regino Souza   1960  Oral Chemotherapy Regimen: Etoposide 200mg PO QAM + 250mg PO QPM on Days 1-3 + IV Carboplatin on Day 1 of 21 day treatment cycle  Date Started Medication:   Cycle 1: 2/15/18      Additional Notes:Spoke with patient this morning to re-review expected side effects, medication administration, anti-emetic regimen, and hazardous storage and handling. Provided written information along with patient calendar. All questions answer.

## 2018-02-19 ENCOUNTER — TELEPHONE (OUTPATIENT)
Dept: ONCOLOGY | Facility: HOSPITAL | Age: 58
End: 2018-02-19

## 2018-02-19 NOTE — TELEPHONE ENCOUNTER
Returned called. Patient filled first script with [facility] through [facility] program. Alerted [facility] mail order patient filling at other location/pharmacy.     ---- Message from Marina Scott RN sent at 2/19/2018  9:48 AM EST -----  Regarding: FW: BADIN-MEDICATION  Contact: [phone]      ----- Message -----     From: Princess Christian     Sent: 2/19/2018   9:43 AM       To: [facility]  Subject: BADIN-MEDICATION                                 Karina with [facility] pharmacy needs the prescription Etoposide verified due to it being sent to the wrong pharmacy and transferred to them reference number is [id number].

## 2018-03-02 ENCOUNTER — HOSPITAL ENCOUNTER (OUTPATIENT)
Dept: RADIATION ONCOLOGY | Facility: HOSPITAL | Age: 58
Setting detail: RADIATION/ONCOLOGY SERIES
Discharge: HOME | End: 2018-03-02

## 2018-03-02 ENCOUNTER — OFFICE VISIT (OUTPATIENT)
Dept: RADIATION ONCOLOGY | Facility: HOSPITAL | Age: 58
End: 2018-03-02

## 2018-03-02 ENCOUNTER — SPECIALTY PHARMACY (OUTPATIENT)
Dept: ONCOLOGY | Facility: HOSPITAL | Age: 58
End: 2018-03-02

## 2018-03-02 VITALS
HEIGHT: 71 IN | DIASTOLIC BLOOD PRESSURE: 71 MMHG | SYSTOLIC BLOOD PRESSURE: 140 MMHG | TEMPERATURE: 98.3 F | RESPIRATION RATE: 16 BRPM | WEIGHT: 242 LBS | HEART RATE: 99 BPM | BODY MASS INDEX: 33.88 KG/M2

## 2018-03-02 DIAGNOSIS — C4A.9 MERKEL CELL CANCER (HCC): Primary | ICD-10-CM

## 2018-03-02 RX ORDER — LANCETS 33 GAUGE
EACH MISCELLANEOUS
COMMUNITY
Start: 2018-02-19

## 2018-03-02 RX ORDER — BLOOD-GLUCOSE METER
EACH MISCELLANEOUS
COMMUNITY
Start: 2018-02-19

## 2018-03-02 RX ORDER — INSULIN ASPART 100 [IU]/ML
INJECTION, SOLUTION INTRAVENOUS; SUBCUTANEOUS
COMMUNITY
Start: 2018-02-19

## 2018-03-02 RX ORDER — DIPHENHYDRAMINE, LIDOCAINE, NYSTATIN
KIT ORAL
Qty: 240 ML | Refills: 3 | Status: SHIPPED | OUTPATIENT
Start: 2018-03-02 | End: 2018-05-21

## 2018-03-02 NOTE — PROGRESS NOTES
Oral Chemotherapy Teaching      Patient Name/:  Regino Souza   1960  Oral Chemotherapy Regimen: Etoposide 200mg PO QAM + 250mg PO QPM on Days 1-3 + IV Carboplatin on Day 1 of 21 day treatment cycle  Date Started Medication:   Cycle 1: 2/15/18    Initial Teaching Follow Up Comments     Safety     Storage instructions (away from children; away from heat/cold, sunlight, or moisture), handling - use of gloves (caregivers), washing hands after touching pills, managing waste     “How are you storing your medications?”, reminders on storage, proper handling (caregivers using gloves, washing hands, away from children, managing waste, etc.), disposal of medication with D/C or dosage change    Discussed appropriate storage and handling of hazardous medication.  Wash hands after handling.      Adherence      patient and/or caregiver on how to take medication, take with/without food, assess their adherence potential, stress importance of adherence, ways to manage adherence (pill boxes, phone reminders, calendars), what to do if miss a dose   “How are you taking your medication?” “How are you remembering to take your medication?”, “How many doses have you missed?”, determine reasons for non-adherence (not remembering, side effects, etc), ways to improve, overadherence? Remind patient of ways to improve/maintain adherence   Etoposide to be administered on an empty stomach (no food 2 hours prior or 1 hour after administration). Plan to take oral etoposide on Day 1-3 of each cycle. Pt aware to split dose - Administer Etoposide 200mg (4 caps) by mouth in the morning and 250mg (5 caps) po in the evening. Discussed use of zofran 30 minutes prior to etoposide administration. Pt aware.Pt reports tolerating first cycle. Due to start next cycle on 3/6/18.  Medication processed at [facility]. Set up for delivery on 3/3/18 for next cycle.      Side Effects/Adverse Reactions      patient on potential side effects,  s/s, ways to manage, when to call MD/seek help     Determine if patient experiencing side effects, ways to manage  Pt reports tolerating first cycle, with minimal complaints. Reports soreness and tenderness noted in mouth. Reports utilizing salt and soda rinse regularly, however has noted some sores around the roof of his mouth/gum where dentures align. Requesting magic mouth rinse. Will alert team to send script to local pharmacy for symptom relief.      Miscellaneous     Food interactions, DDIs, financial issues Determine if patient started any new medications since being placed on oral chemo (analyze for DDI) NO DDIs identified with active medication list and etoposide.      Additional Notes:Patient presented to clinic this afternoon. Called [facility] to set up delivery of Etoposide. Scheduled for delivery 3/3/18. Pt with MD appt on 3/6/18.

## 2018-03-02 NOTE — PROGRESS NOTES
CONSULTATION NOTE    NAME:      Regino Souza  :                                                          1960  DATE OF CONSULTATION:                       3/2/2018   REQUESTING PHYSICIAN:                   Anastasia Waller MD  REASON FOR CONSULTATION:           Merkel cell carcinoma    Staging form: Merkel Cell Carcinoma, AJCC 8th Edition    - Clinical: Stage Unknown (cTX, cN1, cM0)         BRIEF HISTORY:  Regino Souza  is a very pleasant 57 y.o. male   who developed a mass in the left groin that increased in size from 2017.  Dr. Sanchez  referred the patient to Dr. Arrieta who completed excision on 2017. Final pathology revealed Merkel cell carcinoma, 5 x 4.5 x 4 cm. The patient had full body skin exam with Dr. Colmenares that failed to show a primary lesion. PET scan completed 2018 showed no evidence of metastatic disease. Hypermetabolic activity in his thyroid, by ultrasound showed benign appearing nodularity. He is undergoing chemotherapy per Dr. Waller and I have been asked to see him regarding consolidative radiotherapy.    He has no complaints today except fatigue.         No Known Allergies    Social History   Substance Use Topics   • Smoking status: Former Smoker     Packs/day: 2.00     Types: Cigarettes     Quit date:    • Smokeless tobacco: Never Used   • Alcohol use 1.2 oz/week     2 Cans of beer per week       History reviewed. No pertinent past medical history.    family history includes Aortic stenosis in his mother; Bone cancer in his paternal uncle; Breast cancer in his sister; Colon cancer in his maternal grandfather; Diabetes in his mother; Heart attack in his father; Lung cancer in his paternal uncle; Melanoma in his father.     Past Surgical History:   Procedure Laterality Date   • CATARACT EXTRACTION Bilateral     Dr Abel   • COLONOSCOPY     • EXCESSIVE THIGH / HIP / BUTTOCK / FLANK SKIN EXCISION  2017    upper left thight bx excision Dr Arrieta  "       Review of Systems   Constitutional: Positive for fatigue.   All other systems reviewed and are negative.          Objective   VITAL SIGNS:   Vitals:    03/02/18 1016   BP: 140/71   Pulse: 99   Resp: 16   Temp: 98.3 °F (36.8 °C)   Weight: 110 kg (242 lb)   Height: 180.3 cm (70.98\")   PainSc: 0-No pain        KPS       90%    Physical Exam   Constitutional: He appears well-developed and well-nourished.   Neck: Neck supple.   Cardiovascular: Normal rate, regular rhythm and normal heart sounds.    Pulmonary/Chest: Effort normal and breath sounds normal.   Abdominal: Soft.   Nursing note and vitals reviewed.  He has a well healed surgical incision in the left groin.  There is no adenopathy.  The right is negative.           The following portions of the patient's history were reviewed and updated as appropriate: allergies, current medications, past family history, past medical history, past social history, past surgical history and problem list.    Assessment      IMPRESSION:   Resected merkel cell carcinoma of the left groin now undergoing chemotherapy.      RECOMMENDATIONS:  Mr. Carley Gorman has had one 5 cm metastatic Merkel cell lymph node resected from the left groin with unknown location of the primary site.  His PET scan was negative indicating no other positive lymph nodes in this region but except for the risk of subclinical disease.  I recommend postoperative radiotherapy of 50 Gy in 25 fractions to the left groin.  He did not have a complete dissection and had a node 5 cm in size.  He'll return following completion of chemotherapy.  Thank you for asking me to see Mr. Souza.      Gay Carlos MD      Errors in dictation may reflect use of voice recognition software and not all errors in transcription may have been detected prior to signing.  "

## 2018-03-05 ENCOUNTER — TELEPHONE (OUTPATIENT)
Dept: ONCOLOGY | Facility: HOSPITAL | Age: 58
End: 2018-03-05

## 2018-03-05 NOTE — TELEPHONE ENCOUNTER
Called and spoke with patient. Discussed plan to administer Etoposide as discussed on Tuesday 3/6/18 as Day 1 of Cycle 2. Patient with MD appt on 3/6/18 followed by infusion appt for IV Cisplatin. Will plan to cancel infusion appts for Day 2 and 3, as patient has oral etoposide which will plan to administer outpatient (home). Pt confirms plans and voiced understanding.       ----- Message from Marina Scott RN sent at 3/5/2018 10:27 AM EST -----  Regarding: FW: BADIN - MEDICATION QUESTIONS   Contact: [phone]   forwarded to pharmacy  ----- Message -----     From: Maegan Olivarez     Sent: 3/5/2018  10:15 AM       To: [facility]  Subject: BADIN - MEDICATION QUESTIONS                     PATIENT WANTED TO KNOW IF HE TAKES HIS ORAL CHEMO, THE ETOPOSIDE (TOPOSAR;VEPESID) 50 MG CHEMO CAPSULE IN THE MORNING BEFORE HE GETS HIS INFUSION FOR THE CISPLATIN/ 16 WHICH IS SCHEDULED AT 9 AM TOMORROW?     THIS IS WHAT HE SAID HE DID THE LAST TIME, BUT HE WANTED TO VERIFY IF HE DID THE SAME THING?

## 2018-03-06 ENCOUNTER — OFFICE VISIT (OUTPATIENT)
Dept: ONCOLOGY | Facility: CLINIC | Age: 58
End: 2018-03-06

## 2018-03-06 ENCOUNTER — INFUSION (OUTPATIENT)
Dept: ONCOLOGY | Facility: HOSPITAL | Age: 58
End: 2018-03-06

## 2018-03-06 VITALS
DIASTOLIC BLOOD PRESSURE: 60 MMHG | TEMPERATURE: 97.5 F | HEART RATE: 92 BPM | SYSTOLIC BLOOD PRESSURE: 118 MMHG | WEIGHT: 241 LBS | HEIGHT: 71 IN | BODY MASS INDEX: 33.74 KG/M2 | RESPIRATION RATE: 16 BRPM

## 2018-03-06 DIAGNOSIS — C4A.9 MERKEL CELL CARCINOMA (HCC): ICD-10-CM

## 2018-03-06 DIAGNOSIS — C4A.9 MERKEL CELL CARCINOMA (HCC): Primary | ICD-10-CM

## 2018-03-06 LAB
ALBUMIN SERPL-MCNC: 4.1 G/DL (ref 3.2–4.8)
ALBUMIN/GLOB SERPL: 1.5 G/DL (ref 1.5–2.5)
ALP SERPL-CCNC: 74 U/L (ref 25–100)
ALT SERPL W P-5'-P-CCNC: 27 U/L (ref 7–40)
ANION GAP SERPL CALCULATED.3IONS-SCNC: 8 MMOL/L (ref 3–11)
AST SERPL-CCNC: 23 U/L (ref 0–33)
BILIRUB SERPL-MCNC: 1 MG/DL (ref 0.3–1.2)
BUN BLD-MCNC: 11 MG/DL (ref 9–23)
BUN/CREAT SERPL: 13.8 (ref 7–25)
CALCIUM SPEC-SCNC: 9 MG/DL (ref 8.7–10.4)
CHLORIDE SERPL-SCNC: 101 MMOL/L (ref 99–109)
CO2 SERPL-SCNC: 29 MMOL/L (ref 20–31)
CREAT BLD-MCNC: 0.8 MG/DL (ref 0.6–1.3)
CREAT BLDA-MCNC: 0.8 MG/DL (ref 0.6–1.3)
ERYTHROCYTE [DISTWIDTH] IN BLOOD BY AUTOMATED COUNT: 16 % (ref 11.3–14.5)
GFR SERPL CREATININE-BSD FRML MDRD: 100 ML/MIN/1.73
GLOBULIN UR ELPH-MCNC: 2.7 GM/DL
GLUCOSE BLD-MCNC: 143 MG/DL (ref 70–100)
HCT VFR BLD AUTO: 35.5 % (ref 38.9–50.9)
HGB BLD-MCNC: 11.8 G/DL (ref 13.1–17.5)
LYMPHOCYTES # BLD AUTO: 1.7 10*3/MM3 (ref 0.6–4.8)
LYMPHOCYTES NFR BLD AUTO: 22.3 % (ref 24–44)
MAGNESIUM SERPL-MCNC: 1.7 MG/DL (ref 1.3–2.7)
MCH RBC QN AUTO: 29.3 PG (ref 27–31)
MCHC RBC AUTO-ENTMCNC: 33.1 G/DL (ref 32–36)
MCV RBC AUTO: 88.3 FL (ref 80–99)
MONOCYTES # BLD AUTO: 0.7 10*3/MM3 (ref 0–1)
MONOCYTES NFR BLD AUTO: 9.5 % (ref 0–12)
NEUTROPHILS # BLD AUTO: 5.3 10*3/MM3 (ref 1.5–8.3)
NEUTROPHILS NFR BLD AUTO: 68.2 % (ref 41–71)
PLATELET # BLD AUTO: 231 10*3/MM3 (ref 150–450)
PMV BLD AUTO: 7.1 FL (ref 6–12)
POTASSIUM BLD-SCNC: 4.3 MMOL/L (ref 3.5–5.5)
PROT SERPL-MCNC: 6.8 G/DL (ref 5.7–8.2)
RBC # BLD AUTO: 4.02 10*6/MM3 (ref 4.2–5.76)
SODIUM BLD-SCNC: 138 MMOL/L (ref 132–146)
WBC NRBC COR # BLD: 7.7 10*3/MM3 (ref 3.5–10.8)

## 2018-03-06 PROCEDURE — 96367 TX/PROPH/DG ADDL SEQ IV INF: CPT

## 2018-03-06 PROCEDURE — 25010000002 DEXAMETHASONE PER 1 MG: Performed by: NURSE PRACTITIONER

## 2018-03-06 PROCEDURE — 82565 ASSAY OF CREATININE: CPT

## 2018-03-06 PROCEDURE — 80053 COMPREHEN METABOLIC PANEL: CPT

## 2018-03-06 PROCEDURE — 25010000002 PALONOSETRON PER 25 MCG: Performed by: NURSE PRACTITIONER

## 2018-03-06 PROCEDURE — 25010000002 FOSAPREPITANT PER 1 MG: Performed by: NURSE PRACTITIONER

## 2018-03-06 PROCEDURE — 96361 HYDRATE IV INFUSION ADD-ON: CPT

## 2018-03-06 PROCEDURE — 25010000002 CISPLATIN PER 50 MG: Performed by: NURSE PRACTITIONER

## 2018-03-06 PROCEDURE — 25010000002 POTASSIUM CHLORIDE PER 2 MEQ OF POTASSIUM: Performed by: NURSE PRACTITIONER

## 2018-03-06 PROCEDURE — 99215 OFFICE O/P EST HI 40 MIN: CPT | Performed by: INTERNAL MEDICINE

## 2018-03-06 PROCEDURE — 25010000002 MAGNESIUM SULFATE PER 500 MG OF MAGNESIUM: Performed by: NURSE PRACTITIONER

## 2018-03-06 PROCEDURE — 96366 THER/PROPH/DIAG IV INF ADDON: CPT

## 2018-03-06 PROCEDURE — 96375 TX/PRO/DX INJ NEW DRUG ADDON: CPT

## 2018-03-06 PROCEDURE — 36415 COLL VENOUS BLD VENIPUNCTURE: CPT

## 2018-03-06 PROCEDURE — 96413 CHEMO IV INFUSION 1 HR: CPT

## 2018-03-06 PROCEDURE — 85025 COMPLETE CBC W/AUTO DIFF WBC: CPT

## 2018-03-06 PROCEDURE — 83735 ASSAY OF MAGNESIUM: CPT

## 2018-03-06 RX ORDER — SODIUM CHLORIDE 9 MG/ML
250 INJECTION, SOLUTION INTRAVENOUS ONCE
Status: CANCELLED | OUTPATIENT
Start: 2018-03-06

## 2018-03-06 RX ORDER — SODIUM CHLORIDE 9 MG/ML
250 INJECTION, SOLUTION INTRAVENOUS ONCE
Status: COMPLETED | OUTPATIENT
Start: 2018-03-06 | End: 2018-03-06

## 2018-03-06 RX ORDER — HYDROCODONE BITARTRATE AND ACETAMINOPHEN 10; 325 MG/1; MG/1
1 TABLET ORAL EVERY 6 HOURS PRN
COMMUNITY

## 2018-03-06 RX ORDER — PALONOSETRON 0.05 MG/ML
0.25 INJECTION, SOLUTION INTRAVENOUS ONCE
Status: CANCELLED | OUTPATIENT
Start: 2018-03-06

## 2018-03-06 RX ORDER — PALONOSETRON 0.05 MG/ML
0.25 INJECTION, SOLUTION INTRAVENOUS ONCE
Status: COMPLETED | OUTPATIENT
Start: 2018-03-06 | End: 2018-03-06

## 2018-03-06 RX ADMIN — DEXAMETHASONE SODIUM PHOSPHATE 12 MG: 4 INJECTION, SOLUTION INTRAMUSCULAR; INTRAVENOUS at 12:23

## 2018-03-06 RX ADMIN — SODIUM CHLORIDE 250 ML: 9 INJECTION, SOLUTION INTRAVENOUS at 10:19

## 2018-03-06 RX ADMIN — PALONOSETRON HYDROCHLORIDE 0.25 MG: 0.25 INJECTION INTRAVENOUS at 12:21

## 2018-03-06 RX ADMIN — SODIUM CHLORIDE 150 MG: 9 INJECTION, SOLUTION INTRAVENOUS at 12:32

## 2018-03-06 RX ADMIN — CISPLATIN 174 MG: 1 INJECTION, SOLUTION INTRAVENOUS at 13:03

## 2018-03-06 RX ADMIN — MAGNESIUM SULFATE HEPTAHYDRATE 1000 ML: 500 INJECTION, SOLUTION INTRAMUSCULAR; INTRAVENOUS at 14:18

## 2018-03-06 RX ADMIN — MAGNESIUM SULFATE HEPTAHYDRATE 1000 ML: 500 INJECTION, SOLUTION INTRAMUSCULAR; INTRAVENOUS at 10:19

## 2018-03-06 NOTE — PROGRESS NOTES
DATE OF VISIT: 3/6/2018    REASON FOR VISIT: Followup for Merkel cell carcinoma     HISTORY OF PRESENT ILLNESS: The patient is a very pleasant 57 y.o. 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  Whole body PET scan done on January 17, 2018 did not show any evidence of metastatic disease.  The patient was started on adjuvant chemotherapy with cisplatin and etoposide February 13, 2018.  The  patient is here today for cycle #2.    SUBJECTIVE: Positive today with his sister.  He was able to tolerate chemotherapy fairly well.  He had mouth sores.  His blood sugars increase and now he is on preprandial insulin.  He is complaining of fatigue.    PAST MEDICAL HISTORY/SOCIAL HISTORY/FAMILY HISTORY: Unchanged from my prior documentation done on February 6, 2018    Review of Systems   Constitutional: Positive for fatigue. Negative for activity change, appetite change, chills, fever and unexpected weight change.   HENT: Negative for hearing loss, mouth sores, nosebleeds, sore throat and trouble swallowing.    Eyes: Negative for visual disturbance.   Respiratory: Negative for cough, chest tightness, shortness of breath and wheezing.    Cardiovascular: Negative for chest pain, palpitations and leg swelling.   Gastrointestinal: Negative for abdominal distention, abdominal pain, blood in stool, constipation, diarrhea, nausea, rectal pain and vomiting.   Endocrine: Negative for cold intolerance and heat intolerance.   Genitourinary: Negative for difficulty urinating, dysuria, frequency and urgency.   Musculoskeletal: Negative for arthralgias, back pain, gait problem, joint swelling and myalgias.   Skin: Negative for rash.   Neurological: Negative for dizziness, tremors, syncope, weakness, light-headedness, numbness and headaches.   Hematological: Negative for adenopathy. Does not bruise/bleed easily.   Psychiatric/Behavioral: Negative for confusion, sleep disturbance and  suicidal ideas. The patient is not nervous/anxious.          Current Outpatient Prescriptions:   •  HYDROcodone-acetaminophen (NORCO)  MG per tablet, Take 1 tablet by mouth Every 6 (Six) Hours As Needed for Moderate Pain ., Disp: , Rfl:   •  amLODIPine-benazepril (LOTREL) 10-40 MG per capsule, Take 1 capsule by mouth Daily., Disp: , Rfl:   •  aspirin 81 MG tablet, Take 81 mg by mouth Daily., Disp: , Rfl:   •  atorvastatin (LIPITOR) 40 MG tablet, Take 40 mg by mouth Daily., Disp: , Rfl:   •  Blood Glucose Monitoring Suppl (ONE TOUCH ULTRA 2) w/Device kit, , Disp: , Rfl:   •  cholecalciferol (VITAMIN D3) 1000 units tablet, Take 1,000 Units by mouth Daily., Disp: , Rfl:   •  clonazePAM (KlonoPIN) 1 MG tablet, Take 1 mg by mouth 2 (Two) Times a Day As Needed for Anxiety or Seizures., Disp: , Rfl:   •  dexamethasone (DECADRON) 4 MG tablet, Take 2 tablets in the morning the day after chemo (Day 2), then take 2 tablets twice daily on days 3 & 4.  Take with food., Disp: 30 tablet, Rfl: 3  •  Dulaglutide (TRULICITY) 1.5 MG/0.5ML solution pen-injector, Inject 1 dose under the skin 1 (One) Time Per Week., Disp: , Rfl:   •  etoposide (TOPOSAR;VEPESID) 50 MG chemo capsule, Take 9 capsules by mouth Daily. On DAY 1-3 of each 21 DAY cycle of chemotherapy, Disp: 27 capsule, Rfl: 3  •  hydrochlorothiazide (HYDRODIURIL) 50 MG tablet, Take 50 mg by mouth Daily., Disp: , Rfl:   •  metFORMIN (GLUCOPHAGE) 1000 MG tablet, Take 1,000 mg by mouth 2 (Two) Times a Day With Meals., Disp: , Rfl:   •  Multiple Vitamins-Minerals (MULTIVITAL PO), Take 1 tablet by mouth Daily., Disp: , Rfl:   •  NOVOLOG FLEXPEN 100 UNIT/ML solution pen-injector sc pen, , Disp: , Rfl:   •  nystatin susp + lidocaine viscous (MAGIC MOUTHWASH) oral suspension, 5-10 ml swish and spit or swallow QID prn, Disp: 240 mL, Rfl: 3  •  ondansetron (ZOFRAN) 8 MG tablet, Take 1 tablet by mouth 3 (Three) Times a Day As Needed for Nausea or Vomiting., Disp: 30 tablet, Rfl:  "5  •  ONE TOUCH ULTRA TEST test strip, , Disp: , Rfl:   •  ONETOUCH DELICA LANCETS 33G misc, , Disp: , Rfl:   •  oxyCODONE-acetaminophen (PERCOCET) 5-325 MG per tablet, Take 1-2 tablets by mouth Every 4 to 6 Hours As Needed., Disp: 20 tablet, Rfl: 0    PHYSICAL EXAMINATION:   /60  Pulse 92  Temp 97.5 °F (36.4 °C)  Resp 16  Ht 180.3 cm (71\")  Wt 109 kg (241 lb)  BMI 33.61 kg/m2   ECOG Performance Status: 1 - Symptomatic but completely ambulatory  General Appearance:  alert, cooperative, no apparent distress and appears stated age   Neurologic/Psychiatric: A&O x 3, gait steady, appropriate affect, strength 5/5 in all muscle groups   HEENT:  Normocephalic, without obvious abnormality, mucous membranes moist   Neck: Supple, symmetrical, trachea midline, no adenopathy;  No thyromegaly, masses, or tenderness   Lungs:   Clear to auscultation bilaterally; respirations regular, even, and unlabored bilaterally   Heart:  Regular rate and rhythm, no murmurs appreciated   Abdomen:   Soft, non-tender, non-distended and no organomegaly   Lymph nodes: No cervical, supraclavicular, inguinal or axillary adenopathy noted   Extremities: Normal, atraumatic; no clubbing, cyanosis, or edema    Skin: No rashes, ulcers, or suspicious lesions noted     No visits with results within 2 Week(s) from this visit.  Latest known visit with results is:    Infusion on 02/15/2018   Component Date Value Ref Range Status   • Glucose 02/15/2018 189* 70 - 100 mg/dL Final   • BUN 02/15/2018 15  9 - 23 mg/dL Final   • Creatinine 02/15/2018 0.80  0.60 - 1.30 mg/dL Final   • Sodium 02/15/2018 137  132 - 146 mmol/L Final   • Potassium 02/15/2018 3.5  3.5 - 5.5 mmol/L Final   • Chloride 02/15/2018 99  99 - 109 mmol/L Final   • CO2 02/15/2018 30.0  20.0 - 31.0 mmol/L Final   • Calcium 02/15/2018 8.9  8.7 - 10.4 mg/dL Final   • Total Protein 02/15/2018 6.9  5.7 - 8.2 g/dL Final   • Albumin 02/15/2018 4.30  3.20 - 4.80 g/dL Final   • ALT (SGPT) " 02/15/2018 18  7 - 40 U/L Final   • AST (SGOT) 02/15/2018 14  0 - 33 U/L Final   • Alkaline Phosphatase 02/15/2018 66  25 - 100 U/L Final   • Total Bilirubin 02/15/2018 1.5* 0.3 - 1.2 mg/dL Final   • eGFR Non African Amer 02/15/2018 100  >60 mL/min/1.73 Final   • Globulin 02/15/2018 2.6  gm/dL Final   • A/G Ratio 02/15/2018 1.7  1.5 - 2.5 g/dL Final   • BUN/Creatinine Ratio 02/15/2018 18.8  7.0 - 25.0 Final   • Anion Gap 02/15/2018 8.0  3.0 - 11.0 mmol/L Final   • Magnesium 02/15/2018 1.7  1.3 - 2.7 mg/dL Final   • WBC 02/15/2018 10.50  3.50 - 10.80 10*3/mm3 Final   • RBC 02/15/2018 4.81  4.20 - 5.76 10*6/mm3 Final   • Hemoglobin 02/15/2018 14.0  13.1 - 17.5 g/dL Final   • Hematocrit 02/15/2018 42.9  38.9 - 50.9 % Final   • RDW 02/15/2018 13.9  11.3 - 14.5 % Final   • MCV 02/15/2018 89.1  80.0 - 99.0 fL Final   • MCH 02/15/2018 29.1  27.0 - 31.0 pg Final   • MCHC 02/15/2018 32.7  32.0 - 36.0 g/dL Final   • MPV 02/15/2018 8.3  6.0 - 12.0 fL Final   • Platelets 02/15/2018 175  150 - 450 10*3/mm3 Final   • Neutrophil % 02/15/2018 85.8* 41.0 - 71.0 % Final   • Lymphocyte % 02/15/2018 10.9* 24.0 - 44.0 % Final   • Monocyte % 02/15/2018 3.3  0.0 - 12.0 % Final   • Neutrophils, Absolute 02/15/2018 9.00* 1.50 - 8.30 10*3/mm3 Final   • Lymphocytes, Absolute 02/15/2018 1.10  0.60 - 4.80 10*3/mm3 Final   • Monocytes, Absolute 02/15/2018 0.30  0.00 - 1.00 10*3/mm3 Final        No results found.    ASSESSMENT: The patient is a very pleasant 57 y.o. male  with Merkel cell carcinoma    PROBLEM LIST:  1. Merkel cell carcinoma [id number]  A. Presented with left groin mass.   B. Status post left groin excision of 5 cm mass.   C. Final pathology revealed Merkel cell carcinoma.   D. PET scan for clinical staging done 1/17/2018 failed to show evidence of metastatic disease.   E. Started adjuvant chemotherapy with cisplatin and etoposide February 13, 2018  2. History of basal cell carcinoma of the skin.   3. Type 2 diabetes.   4.  Hypertension.    PLAN:  1.  I would proceed with cycle #2 of chemotherapy as scheduled today.  2.  The patient will follow-up with me in 3 weeks for cycle #3 out of 4 planned.  3.  I will continue to monitor patient blood work including blood counts kidney function and liver function.  4.  I will continue Zofran as needed for chemotherapy-induced nausea.  5.  I'll start patient on magic mouthwash as needed for treatment induced mucositis.  6.  We'll continue to monitor patient blood sugars, it has been fluctuating mostly ice cream to steroids to be given concurrently with his chemotherapy.  7.  Again I reviewed the potential side effects of this treatment including hair loss, nausea, vomiting, fatigue, bone marrow suppression, infection, allergic reaction to his regimen, and even death.   8. I will monitor patient blood pressure while he is on chemotherapy since it might fluctuate.  9.  The patient will receive adjuvant radiation treatment to left groin after her chemotherapy.  I discussed the case with Dr. Carlos from radiation oncology.  10.  Future treatment options would include immunotherapy.    Anastasia Waller MD  3/6/2018

## 2018-03-07 ENCOUNTER — APPOINTMENT (OUTPATIENT)
Dept: ONCOLOGY | Facility: HOSPITAL | Age: 58
End: 2018-03-07

## 2018-03-08 ENCOUNTER — APPOINTMENT (OUTPATIENT)
Dept: ONCOLOGY | Facility: HOSPITAL | Age: 58
End: 2018-03-08

## 2018-03-27 ENCOUNTER — SPECIALTY PHARMACY (OUTPATIENT)
Dept: ONCOLOGY | Facility: HOSPITAL | Age: 58
End: 2018-03-27

## 2018-03-27 ENCOUNTER — OFFICE VISIT (OUTPATIENT)
Dept: ONCOLOGY | Facility: CLINIC | Age: 58
End: 2018-03-27

## 2018-03-27 ENCOUNTER — INFUSION (OUTPATIENT)
Dept: ONCOLOGY | Facility: HOSPITAL | Age: 58
End: 2018-03-27

## 2018-03-27 VITALS
TEMPERATURE: 97.3 F | SYSTOLIC BLOOD PRESSURE: 136 MMHG | DIASTOLIC BLOOD PRESSURE: 71 MMHG | WEIGHT: 249 LBS | HEART RATE: 97 BPM | HEIGHT: 71 IN | BODY MASS INDEX: 34.86 KG/M2 | RESPIRATION RATE: 16 BRPM

## 2018-03-27 DIAGNOSIS — C4A.9 MERKEL CELL CANCER (HCC): Primary | ICD-10-CM

## 2018-03-27 DIAGNOSIS — C4A.9 MERKEL CELL CANCER (HCC): ICD-10-CM

## 2018-03-27 LAB
ALBUMIN SERPL-MCNC: 4.1 G/DL (ref 3.2–4.8)
ALBUMIN/GLOB SERPL: 2 G/DL (ref 1.5–2.5)
ALP SERPL-CCNC: 65 U/L (ref 25–100)
ALT SERPL W P-5'-P-CCNC: 17 U/L (ref 7–40)
ANION GAP SERPL CALCULATED.3IONS-SCNC: 8 MMOL/L (ref 3–11)
AST SERPL-CCNC: 17 U/L (ref 0–33)
BILIRUB SERPL-MCNC: 0.8 MG/DL (ref 0.3–1.2)
BUN BLD-MCNC: 11 MG/DL (ref 9–23)
BUN/CREAT SERPL: 13.8 (ref 7–25)
CALCIUM SPEC-SCNC: 8.9 MG/DL (ref 8.7–10.4)
CHLORIDE SERPL-SCNC: 104 MMOL/L (ref 99–109)
CO2 SERPL-SCNC: 28 MMOL/L (ref 20–31)
CREAT BLD-MCNC: 0.8 MG/DL (ref 0.6–1.3)
CREAT BLDA-MCNC: 0.7 MG/DL (ref 0.6–1.3)
ERYTHROCYTE [DISTWIDTH] IN BLOOD BY AUTOMATED COUNT: 19.7 % (ref 11.3–14.5)
GFR SERPL CREATININE-BSD FRML MDRD: 100 ML/MIN/1.73
GLOBULIN UR ELPH-MCNC: 2.1 GM/DL
GLUCOSE BLD-MCNC: 138 MG/DL (ref 70–100)
HCT VFR BLD AUTO: 32.3 % (ref 38.9–50.9)
HGB BLD-MCNC: 10.6 G/DL (ref 13.1–17.5)
LYMPHOCYTES # BLD AUTO: 1.3 10*3/MM3 (ref 0.6–4.8)
LYMPHOCYTES NFR BLD AUTO: 30.4 % (ref 24–44)
MAGNESIUM SERPL-MCNC: 1.7 MG/DL (ref 1.3–2.7)
MCH RBC QN AUTO: 29.7 PG (ref 27–31)
MCHC RBC AUTO-ENTMCNC: 32.7 G/DL (ref 32–36)
MCV RBC AUTO: 90.7 FL (ref 80–99)
MONOCYTES # BLD AUTO: 0.4 10*3/MM3 (ref 0–1)
MONOCYTES NFR BLD AUTO: 8.8 % (ref 0–12)
NEUTROPHILS # BLD AUTO: 2.6 10*3/MM3 (ref 1.5–8.3)
NEUTROPHILS NFR BLD AUTO: 60.8 % (ref 41–71)
PLATELET # BLD AUTO: 223 10*3/MM3 (ref 150–450)
PMV BLD AUTO: 7.1 FL (ref 6–12)
POTASSIUM BLD-SCNC: 4 MMOL/L (ref 3.5–5.5)
PROT SERPL-MCNC: 6.2 G/DL (ref 5.7–8.2)
RBC # BLD AUTO: 3.56 10*6/MM3 (ref 4.2–5.76)
SODIUM BLD-SCNC: 140 MMOL/L (ref 132–146)
WBC NRBC COR # BLD: 4.3 10*3/MM3 (ref 3.5–10.8)

## 2018-03-27 PROCEDURE — 25010000002 POTASSIUM CHLORIDE PER 2 MEQ OF POTASSIUM: Performed by: NURSE PRACTITIONER

## 2018-03-27 PROCEDURE — 36415 COLL VENOUS BLD VENIPUNCTURE: CPT

## 2018-03-27 PROCEDURE — 82565 ASSAY OF CREATININE: CPT

## 2018-03-27 PROCEDURE — 96375 TX/PRO/DX INJ NEW DRUG ADDON: CPT

## 2018-03-27 PROCEDURE — 96366 THER/PROPH/DIAG IV INF ADDON: CPT

## 2018-03-27 PROCEDURE — 85025 COMPLETE CBC W/AUTO DIFF WBC: CPT

## 2018-03-27 PROCEDURE — 25010000002 DEXAMETHASONE PER 1 MG: Performed by: NURSE PRACTITIONER

## 2018-03-27 PROCEDURE — 25010000002 PALONOSETRON PER 25 MCG: Performed by: NURSE PRACTITIONER

## 2018-03-27 PROCEDURE — 25010000002 FOSAPREPITANT PER 1 MG: Performed by: NURSE PRACTITIONER

## 2018-03-27 PROCEDURE — 96376 TX/PRO/DX INJ SAME DRUG ADON: CPT

## 2018-03-27 PROCEDURE — 83735 ASSAY OF MAGNESIUM: CPT

## 2018-03-27 PROCEDURE — 96367 TX/PROPH/DG ADDL SEQ IV INF: CPT

## 2018-03-27 PROCEDURE — 80053 COMPREHEN METABOLIC PANEL: CPT

## 2018-03-27 PROCEDURE — 25010000002 MAGNESIUM SULFATE PER 500 MG OF MAGNESIUM: Performed by: NURSE PRACTITIONER

## 2018-03-27 PROCEDURE — 96413 CHEMO IV INFUSION 1 HR: CPT

## 2018-03-27 PROCEDURE — 99215 OFFICE O/P EST HI 40 MIN: CPT | Performed by: INTERNAL MEDICINE

## 2018-03-27 PROCEDURE — 25010000002 CISPLATIN PER 50 MG: Performed by: NURSE PRACTITIONER

## 2018-03-27 RX ORDER — SODIUM CHLORIDE 9 MG/ML
250 INJECTION, SOLUTION INTRAVENOUS ONCE
Status: CANCELLED | OUTPATIENT
Start: 2018-03-27

## 2018-03-27 RX ORDER — SODIUM CHLORIDE 9 MG/ML
250 INJECTION, SOLUTION INTRAVENOUS ONCE
Status: COMPLETED | OUTPATIENT
Start: 2018-03-27 | End: 2018-03-27

## 2018-03-27 RX ORDER — PALONOSETRON 0.05 MG/ML
0.25 INJECTION, SOLUTION INTRAVENOUS ONCE
Status: COMPLETED | OUTPATIENT
Start: 2018-03-27 | End: 2018-03-27

## 2018-03-27 RX ORDER — PALONOSETRON 0.05 MG/ML
0.25 INJECTION, SOLUTION INTRAVENOUS ONCE
Status: CANCELLED | OUTPATIENT
Start: 2018-03-27

## 2018-03-27 RX ADMIN — CISPLATIN 174 MG: 1 INJECTION, SOLUTION INTRAVENOUS at 13:05

## 2018-03-27 RX ADMIN — PALONOSETRON HYDROCHLORIDE 0.25 MG: 0.25 INJECTION INTRAVENOUS at 12:21

## 2018-03-27 RX ADMIN — SODIUM CHLORIDE 250 ML: 9 INJECTION, SOLUTION INTRAVENOUS at 10:10

## 2018-03-27 RX ADMIN — MAGNESIUM SULFATE HEPTAHYDRATE 1000 ML: 500 INJECTION, SOLUTION INTRAMUSCULAR; INTRAVENOUS at 10:10

## 2018-03-27 RX ADMIN — MAGNESIUM SULFATE HEPTAHYDRATE 1000 ML: 500 INJECTION, SOLUTION INTRAMUSCULAR; INTRAVENOUS at 14:12

## 2018-03-27 RX ADMIN — SODIUM CHLORIDE 150 MG: 9 INJECTION, SOLUTION INTRAVENOUS at 12:23

## 2018-03-27 RX ADMIN — DEXAMETHASONE SODIUM PHOSPHATE 12 MG: 4 INJECTION, SOLUTION INTRAMUSCULAR; INTRAVENOUS at 12:23

## 2018-03-27 NOTE — PROGRESS NOTES
Oral Chemotherapy Teaching      Patient Name/:  Regino Souza   1960  Oral Chemotherapy Regimen: Etoposide 200mg PO QAM + 250mg PO QPM on Days 1-3 + IV Carboplatin on Day 1 of 21 day treatment cycle  Date Started Medication:   Cycle 1: 2/15/18  Cycle 2: 3/6/18  Cycle 3: 3/27/18    Initial Teaching Follow Up Comments     Safety     Storage instructions (away from children; away from heat/cold, sunlight, or moisture), handling - use of gloves (caregivers), washing hands after touching pills, managing waste     “How are you storing your medications?”, reminders on storage, proper handling (caregivers using gloves, washing hands, away from children, managing waste, etc.), disposal of medication with D/C or dosage change    Discussed appropriate storage and handling of hazardous medication.  Wash hands after handling.      Adherence      patient and/or caregiver on how to take medication, take with/without food, assess their adherence potential, stress importance of adherence, ways to manage adherence (pill boxes, phone reminders, calendars), what to do if miss a dose   “How are you taking your medication?” “How are you remembering to take your medication?”, “How many doses have you missed?”, determine reasons for non-adherence (not remembering, side effects, etc), ways to improve, overadherence? Remind patient of ways to improve/maintain adherence   Etoposide to be administered on an empty stomach (no food 2 hours prior or 1 hour after administration). Plan to take oral etoposide on Day 1-3 of each cycle. Pt aware to split dose - Administer Etoposide 200mg (4 caps) by mouth in the morning and 250mg (5 caps) po in the evening. Discussed use of zofran 30 minutes prior to etoposide administration. Pt aware.Pt reports administer AM dose this morning on an empty stomach. Discussed use of Dex on Days 2-4 of each cycle. Pt aware of dosing and revommendations.  Medication processed at [facility].      Side  Effects/Adverse Reactions      patient on potential side effects, s/s, ways to manage, when to call MD/seek help     Determine if patient experiencing side effects, ways to manage  Pt reports improvement in mouth sores since start of MMW. Reports that still some soreness in area around roof of mouth , but this has improved.       Miscellaneous     Food interactions, DDIs, financial issues Determine if patient started any new medications since being placed on oral chemo (analyze for DDI) NO DDIs identified with active medication list and etoposide.      Additional Notes:Patient seen in infusion, all questions answered and address. Pt here today for Cycle 3 of planned 4 cycles. Aware to call with any additional questions or concerns.

## 2018-03-27 NOTE — PROGRESS NOTES
DATE OF VISIT: 3/27/2018    REASON FOR VISIT: Followup for Merkel cell carcinoma     HISTORY OF PRESENT ILLNESS: The patient is a very pleasant 57 y.o. 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  Whole body PET scan done on January 17, 2018 did not show any evidence of metastatic disease.  The patient was started on adjuvant chemotherapy with cisplatin and etoposide February 13, 2018.  The patient is here today for cycle #3.    SUBJECTIVE: Patient is here today with his sister.  He was able to tolerate chemotherapy fairly well.  He had mouth sores.  His blood sugars increase and he is on preprandial insulin.  He is complaining of fatigue.  He met with radiation oncology last week.    PAST MEDICAL HISTORY/SOCIAL HISTORY/FAMILY HISTORY: Unchanged from my prior documentation done on February 6, 2018    Review of Systems   Constitutional: Positive for fatigue. Negative for activity change, appetite change, chills, fever and unexpected weight change.   HENT: Negative for hearing loss, mouth sores, nosebleeds, sore throat and trouble swallowing.    Eyes: Negative for visual disturbance.   Respiratory: Negative for cough, chest tightness, shortness of breath and wheezing.    Cardiovascular: Negative for chest pain, palpitations and leg swelling.   Gastrointestinal: Negative for abdominal distention, abdominal pain, blood in stool, constipation, diarrhea, nausea, rectal pain and vomiting.   Endocrine: Negative for cold intolerance and heat intolerance.   Genitourinary: Negative for difficulty urinating, dysuria, frequency and urgency.   Musculoskeletal: Negative for arthralgias, back pain, gait problem, joint swelling and myalgias.   Skin: Negative for rash.   Neurological: Negative for dizziness, tremors, syncope, weakness, light-headedness, numbness and headaches.   Hematological: Negative for adenopathy. Does not bruise/bleed easily.   Psychiatric/Behavioral:  Negative for confusion, sleep disturbance and suicidal ideas. The patient is not nervous/anxious.          Current Outpatient Prescriptions:   •  Insulin Glargine (TOUJEO SOLOSTAR SC), Inject 12 Units under the skin As Needed., Disp: , Rfl:   •  amLODIPine-benazepril (LOTREL) 10-40 MG per capsule, Take 1 capsule by mouth Daily., Disp: , Rfl:   •  aspirin 81 MG tablet, Take 81 mg by mouth Daily., Disp: , Rfl:   •  atorvastatin (LIPITOR) 40 MG tablet, Take 40 mg by mouth Daily., Disp: , Rfl:   •  Blood Glucose Monitoring Suppl (ONE TOUCH ULTRA 2) w/Device kit, , Disp: , Rfl:   •  cholecalciferol (VITAMIN D3) 1000 units tablet, Take 1,000 Units by mouth Daily., Disp: , Rfl:   •  clonazePAM (KlonoPIN) 1 MG tablet, Take 1 mg by mouth 2 (Two) Times a Day As Needed for Anxiety or Seizures., Disp: , Rfl:   •  dexamethasone (DECADRON) 4 MG tablet, Take 2 tablets in the morning the day after chemo (Day 2), then take 2 tablets twice daily on days 3 & 4.  Take with food., Disp: 30 tablet, Rfl: 3  •  Dulaglutide (TRULICITY) 1.5 MG/0.5ML solution pen-injector, Inject 1 dose under the skin 1 (One) Time Per Week., Disp: , Rfl:   •  etoposide (TOPOSAR;VEPESID) 50 MG chemo capsule, Take 9 capsules by mouth Daily. On DAY 1-3 of each 21 DAY cycle of chemotherapy, Disp: 27 capsule, Rfl: 3  •  hydrochlorothiazide (HYDRODIURIL) 50 MG tablet, Take 50 mg by mouth Daily., Disp: , Rfl:   •  HYDROcodone-acetaminophen (NORCO)  MG per tablet, Take 1 tablet by mouth Every 6 (Six) Hours As Needed for Moderate Pain ., Disp: , Rfl:   •  metFORMIN (GLUCOPHAGE) 1000 MG tablet, Take 1,000 mg by mouth 2 (Two) Times a Day With Meals., Disp: , Rfl:   •  Multiple Vitamins-Minerals (MULTIVITAL PO), Take 1 tablet by mouth Daily., Disp: , Rfl:   •  NOVOLOG FLEXPEN 100 UNIT/ML solution pen-injector sc pen, , Disp: , Rfl:   •  nystatin susp + lidocaine viscous (MAGIC MOUTHWASH) oral suspension, 5-10 ml swish and spit or swallow QID prn, Disp: 240 mL, Rfl:  "3  •  ondansetron (ZOFRAN) 8 MG tablet, Take 1 tablet by mouth 3 (Three) Times a Day As Needed for Nausea or Vomiting., Disp: 30 tablet, Rfl: 5  •  ONE TOUCH ULTRA TEST test strip, , Disp: , Rfl:   •  ONETOUCH DELICA LANCETS 33G misc, , Disp: , Rfl:   •  oxyCODONE-acetaminophen (PERCOCET) 5-325 MG per tablet, Take 1-2 tablets by mouth Every 4 to 6 Hours As Needed., Disp: 20 tablet, Rfl: 0    PHYSICAL EXAMINATION:   /71   Pulse 97   Temp 97.3 °F (36.3 °C)   Resp 16   Ht 180.3 cm (71\")   Wt 113 kg (249 lb)   BMI 34.73 kg/m²    ECOG Performance Status: 1 - Symptomatic but completely ambulatory  General Appearance:  alert, cooperative, no apparent distress and appears stated age   Neurologic/Psychiatric: A&O x 3, gait steady, appropriate affect, strength 5/5 in all muscle groups   HEENT:  Normocephalic, without obvious abnormality, mucous membranes moist   Neck: Supple, symmetrical, trachea midline, no adenopathy;  No thyromegaly, masses, or tenderness   Lungs:   Clear to auscultation bilaterally; respirations regular, even, and unlabored bilaterally   Heart:  Regular rate and rhythm, no murmurs appreciated   Abdomen:   Soft, non-tender, non-distended and no organomegaly   Lymph nodes: No cervical, supraclavicular, inguinal or axillary adenopathy noted   Extremities: Normal, atraumatic; no clubbing, cyanosis, or edema    Skin: No rashes, ulcers, or suspicious lesions noted     No visits with results within 2 Week(s) from this visit.   Latest known visit with results is:   Infusion on 03/06/2018   Component Date Value Ref Range Status   • Glucose 03/06/2018 143* 70 - 100 mg/dL Final   • BUN 03/06/2018 11  9 - 23 mg/dL Final   • Creatinine 03/06/2018 0.80  0.60 - 1.30 mg/dL Final   • Sodium 03/06/2018 138  132 - 146 mmol/L Final   • Potassium 03/06/2018 4.3  3.5 - 5.5 mmol/L Final   • Chloride 03/06/2018 101  99 - 109 mmol/L Final   • CO2 03/06/2018 29.0  20.0 - 31.0 mmol/L Final   • Calcium 03/06/2018 9.0  " 8.7 - 10.4 mg/dL Final   • Total Protein 03/06/2018 6.8  5.7 - 8.2 g/dL Final   • Albumin 03/06/2018 4.10  3.20 - 4.80 g/dL Final   • ALT (SGPT) 03/06/2018 27  7 - 40 U/L Final   • AST (SGOT) 03/06/2018 23  0 - 33 U/L Final   • Alkaline Phosphatase 03/06/2018 74  25 - 100 U/L Final   • Total Bilirubin 03/06/2018 1.0  0.3 - 1.2 mg/dL Final   • eGFR Non African Amer 03/06/2018 100  >60 mL/min/1.73 Final   • Globulin 03/06/2018 2.7  gm/dL Final   • A/G Ratio 03/06/2018 1.5  1.5 - 2.5 g/dL Final   • BUN/Creatinine Ratio 03/06/2018 13.8  7.0 - 25.0 Final   • Anion Gap 03/06/2018 8.0  3.0 - 11.0 mmol/L Final   • Magnesium 03/06/2018 1.7  1.3 - 2.7 mg/dL Final   • WBC 03/06/2018 7.70  3.50 - 10.80 10*3/mm3 Final   • RBC 03/06/2018 4.02* 4.20 - 5.76 10*6/mm3 Final   • Hemoglobin 03/06/2018 11.8* 13.1 - 17.5 g/dL Final   • Hematocrit 03/06/2018 35.5* 38.9 - 50.9 % Final   • RDW 03/06/2018 16.0* 11.3 - 14.5 % Final   • MCV 03/06/2018 88.3  80.0 - 99.0 fL Final   • MCH 03/06/2018 29.3  27.0 - 31.0 pg Final   • MCHC 03/06/2018 33.1  32.0 - 36.0 g/dL Final   • MPV 03/06/2018 7.1  6.0 - 12.0 fL Final   • Platelets 03/06/2018 231  150 - 450 10*3/mm3 Final   • Neutrophil % 03/06/2018 68.2  41.0 - 71.0 % Final   • Lymphocyte % 03/06/2018 22.3* 24.0 - 44.0 % Final   • Monocyte % 03/06/2018 9.5  0.0 - 12.0 % Final   • Neutrophils, Absolute 03/06/2018 5.30  1.50 - 8.30 10*3/mm3 Final   • Lymphocytes, Absolute 03/06/2018 1.70  0.60 - 4.80 10*3/mm3 Final   • Monocytes, Absolute 03/06/2018 0.70  0.00 - 1.00 10*3/mm3 Final   • Creatinine 03/06/2018 0.80  0.60 - 1.30 mg/dL Final        No results found.    ASSESSMENT: The patient is a very pleasant 57 y.o. male  with Merkel cell carcinoma    PROBLEM LIST:  1. Merkel cell carcinoma [id number]  A. Presented with left groin mass.   B. Status post left groin excision of 5 cm mass.   C. Final pathology revealed Merkel cell carcinoma.   D. PET scan for clinical staging done 1/17/2018 failed to show  evidence of metastatic disease.   E. Started adjuvant chemotherapy with cisplatin and etoposide February 13, 2018  2. History of basal cell carcinoma of the skin.   3. Type 2 diabetes.   4. Hypertension.    PLAN:  1.  I would proceed with cycle #3 of chemotherapy as scheduled today.  2.  The patient will follow-up with me in 3 weeks for cycle #4 out of 4 planned.  3.  I will continue to monitor patient blood work including blood counts kidney function and liver function.  4.  I will continue Zofran as needed for chemotherapy-induced nausea.  5.  I'll continue patient on magic mouthwash as needed for treatment induced mucositis.  6.  We'll continue to monitor patient blood sugars, it has been fluctuating mostly ice cream to steroids to be given concurrently with his chemotherapy.  7.  Again I reviewed the potential side effects of this treatment including hair loss, nausea, vomiting, fatigue, bone marrow suppression, infection, allergic reaction to his regimen, and even death.   8. I will monitor patient blood pressure while he is on chemotherapy since it might fluctuate.  9.  The patient will receive adjuvant radiation treatment to left groin after her chemotherapy.   10.  Future treatment options would include immunotherapy.  11.  The patient would have repeat scans after completion of chemotherapy.  Anastasia Waller MD  3/27/2018

## 2018-04-17 ENCOUNTER — OFFICE VISIT (OUTPATIENT)
Dept: ONCOLOGY | Facility: CLINIC | Age: 58
End: 2018-04-17

## 2018-04-17 ENCOUNTER — SPECIALTY PHARMACY (OUTPATIENT)
Dept: ONCOLOGY | Facility: HOSPITAL | Age: 58
End: 2018-04-17

## 2018-04-17 ENCOUNTER — INFUSION (OUTPATIENT)
Dept: ONCOLOGY | Facility: HOSPITAL | Age: 58
End: 2018-04-17

## 2018-04-17 ENCOUNTER — LAB (OUTPATIENT)
Dept: LAB | Facility: HOSPITAL | Age: 58
End: 2018-04-17

## 2018-04-17 VITALS
WEIGHT: 247.6 LBS | RESPIRATION RATE: 16 BRPM | HEART RATE: 91 BPM | HEIGHT: 71 IN | DIASTOLIC BLOOD PRESSURE: 71 MMHG | BODY MASS INDEX: 34.66 KG/M2 | TEMPERATURE: 97.8 F | OXYGEN SATURATION: 97 % | SYSTOLIC BLOOD PRESSURE: 139 MMHG

## 2018-04-17 DIAGNOSIS — C4A.9 MERKEL CELL CANCER (HCC): Primary | ICD-10-CM

## 2018-04-17 DIAGNOSIS — C4A.9 MERKEL CELL CANCER (HCC): ICD-10-CM

## 2018-04-17 LAB
ALBUMIN SERPL-MCNC: 4.3 G/DL (ref 3.2–4.8)
ALBUMIN/GLOB SERPL: 1.9 G/DL (ref 1.5–2.5)
ALP SERPL-CCNC: 63 U/L (ref 25–100)
ALT SERPL W P-5'-P-CCNC: 15 U/L (ref 7–40)
ANION GAP SERPL CALCULATED.3IONS-SCNC: 8 MMOL/L (ref 3–11)
AST SERPL-CCNC: 14 U/L (ref 0–33)
BILIRUB SERPL-MCNC: 0.9 MG/DL (ref 0.3–1.2)
BUN BLD-MCNC: 16 MG/DL (ref 9–23)
BUN/CREAT SERPL: 17.8 (ref 7–25)
CALCIUM SPEC-SCNC: 9.1 MG/DL (ref 8.7–10.4)
CHLORIDE SERPL-SCNC: 101 MMOL/L (ref 99–109)
CO2 SERPL-SCNC: 29 MMOL/L (ref 20–31)
CREAT BLD-MCNC: 0.9 MG/DL (ref 0.6–1.3)
ERYTHROCYTE [DISTWIDTH] IN BLOOD BY AUTOMATED COUNT: 18.6 % (ref 11.3–14.5)
GFR SERPL CREATININE-BSD FRML MDRD: 87 ML/MIN/1.73
GLOBULIN UR ELPH-MCNC: 2.3 GM/DL
GLUCOSE BLD-MCNC: 192 MG/DL (ref 70–100)
HCT VFR BLD AUTO: 32.6 % (ref 38.9–50.9)
HGB BLD-MCNC: 10.5 G/DL (ref 13.1–17.5)
LYMPHOCYTES # BLD AUTO: 1.4 10*3/MM3 (ref 0.6–4.8)
LYMPHOCYTES NFR BLD AUTO: 22.9 % (ref 24–44)
MAGNESIUM SERPL-MCNC: 1.8 MG/DL (ref 1.3–2.7)
MCH RBC QN AUTO: 29.1 PG (ref 27–31)
MCHC RBC AUTO-ENTMCNC: 32.1 G/DL (ref 32–36)
MCV RBC AUTO: 90.5 FL (ref 80–99)
MONOCYTES # BLD AUTO: 0.5 10*3/MM3 (ref 0–1)
MONOCYTES NFR BLD AUTO: 7.8 % (ref 0–12)
NEUTROPHILS # BLD AUTO: 4.2 10*3/MM3 (ref 1.5–8.3)
NEUTROPHILS NFR BLD AUTO: 69.3 % (ref 41–71)
PLATELET # BLD AUTO: 256 10*3/MM3 (ref 150–450)
PMV BLD AUTO: 7.5 FL (ref 6–12)
POTASSIUM BLD-SCNC: 4 MMOL/L (ref 3.5–5.5)
PROT SERPL-MCNC: 6.6 G/DL (ref 5.7–8.2)
RBC # BLD AUTO: 3.6 10*6/MM3 (ref 4.2–5.76)
SODIUM BLD-SCNC: 138 MMOL/L (ref 132–146)
WBC NRBC COR # BLD: 6.1 10*3/MM3 (ref 3.5–10.8)

## 2018-04-17 PROCEDURE — 96374 THER/PROPH/DIAG INJ IV PUSH: CPT

## 2018-04-17 PROCEDURE — 96365 THER/PROPH/DIAG IV INF INIT: CPT

## 2018-04-17 PROCEDURE — 96413 CHEMO IV INFUSION 1 HR: CPT

## 2018-04-17 PROCEDURE — 96366 THER/PROPH/DIAG IV INF ADDON: CPT

## 2018-04-17 PROCEDURE — 85025 COMPLETE CBC W/AUTO DIFF WBC: CPT

## 2018-04-17 PROCEDURE — 99215 OFFICE O/P EST HI 40 MIN: CPT | Performed by: INTERNAL MEDICINE

## 2018-04-17 PROCEDURE — 25010000002 POTASSIUM CHLORIDE PER 2 MEQ OF POTASSIUM: Performed by: INTERNAL MEDICINE

## 2018-04-17 PROCEDURE — 36415 COLL VENOUS BLD VENIPUNCTURE: CPT

## 2018-04-17 PROCEDURE — 25010000002 MAGNESIUM SULFATE PER 500 MG OF MAGNESIUM: Performed by: INTERNAL MEDICINE

## 2018-04-17 PROCEDURE — 25010000002 DEXAMETHASONE PER 1 MG: Performed by: INTERNAL MEDICINE

## 2018-04-17 PROCEDURE — 25010000002 FOSAPREPITANT PER 1 MG: Performed by: INTERNAL MEDICINE

## 2018-04-17 PROCEDURE — 80053 COMPREHEN METABOLIC PANEL: CPT

## 2018-04-17 PROCEDURE — 83735 ASSAY OF MAGNESIUM: CPT

## 2018-04-17 PROCEDURE — 96361 HYDRATE IV INFUSION ADD-ON: CPT

## 2018-04-17 PROCEDURE — 96375 TX/PRO/DX INJ NEW DRUG ADDON: CPT

## 2018-04-17 PROCEDURE — 96367 TX/PROPH/DG ADDL SEQ IV INF: CPT

## 2018-04-17 PROCEDURE — 25010000002 CISPLATIN PER 50 MG: Performed by: INTERNAL MEDICINE

## 2018-04-17 PROCEDURE — 25010000002 PALONOSETRON PER 25 MCG: Performed by: INTERNAL MEDICINE

## 2018-04-17 PROCEDURE — 96360 HYDRATION IV INFUSION INIT: CPT

## 2018-04-17 RX ORDER — PALONOSETRON 0.05 MG/ML
0.25 INJECTION, SOLUTION INTRAVENOUS ONCE
Status: COMPLETED | OUTPATIENT
Start: 2018-04-17 | End: 2018-04-17

## 2018-04-17 RX ORDER — SODIUM CHLORIDE 9 MG/ML
250 INJECTION, SOLUTION INTRAVENOUS ONCE
Status: CANCELLED | OUTPATIENT
Start: 2018-04-17

## 2018-04-17 RX ORDER — SODIUM CHLORIDE 9 MG/ML
250 INJECTION, SOLUTION INTRAVENOUS ONCE
Status: COMPLETED | OUTPATIENT
Start: 2018-04-17 | End: 2018-04-17

## 2018-04-17 RX ORDER — PALONOSETRON 0.05 MG/ML
0.25 INJECTION, SOLUTION INTRAVENOUS ONCE
Status: CANCELLED | OUTPATIENT
Start: 2018-04-17

## 2018-04-17 RX ADMIN — SODIUM CHLORIDE 150 MG: 9 INJECTION, SOLUTION INTRAVENOUS at 11:38

## 2018-04-17 RX ADMIN — PALONOSETRON HYDROCHLORIDE 0.25 MG: 0.25 INJECTION INTRAVENOUS at 11:37

## 2018-04-17 RX ADMIN — DEXAMETHASONE SODIUM PHOSPHATE 12 MG: 4 INJECTION, SOLUTION INTRAMUSCULAR; INTRAVENOUS at 12:11

## 2018-04-17 RX ADMIN — MAGNESIUM SULFATE HEPTAHYDRATE 1000 ML: 500 INJECTION, SOLUTION INTRAMUSCULAR; INTRAVENOUS at 09:50

## 2018-04-17 RX ADMIN — MAGNESIUM SULFATE HEPTAHYDRATE 1000 ML: 500 INJECTION, SOLUTION INTRAMUSCULAR; INTRAVENOUS at 13:42

## 2018-04-17 RX ADMIN — SODIUM CHLORIDE 250 ML: 9 INJECTION, SOLUTION INTRAVENOUS at 11:36

## 2018-04-17 RX ADMIN — CISPLATIN 174 MG: 1 INJECTION, SOLUTION INTRAVENOUS at 12:35

## 2018-04-17 NOTE — PROGRESS NOTES
DATE OF VISIT: 4/17/2018    REASON FOR VISIT: Followup for Merkel cell carcinoma     HISTORY OF PRESENT ILLNESS: The patient is a very pleasant 57 y.o. 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  Whole body PET scan done on January 17, 2018 did not show any evidence of metastatic disease.  The patient was started on adjuvant chemotherapy with cisplatin and etoposide February 13, 2018.  The patient is here today for cycle #4.    SUBJECTIVE: Patient is here today with his sister.  He is able to tolerate chemotherapy fairly well.  He has mouth sores.  His blood sugars increase and he is on preprandial insulin.  He is complaining of fatigue, that has been lasting longer after treatment.  He is complaining of mild pain at a previous IV infusion site.    PAST MEDICAL HISTORY/SOCIAL HISTORY/FAMILY HISTORY: Unchanged from my prior documentation done on February 6, 2018    Review of Systems   Constitutional: Positive for fatigue. Negative for activity change, appetite change, chills, fever and unexpected weight change.   HENT: Negative for hearing loss, mouth sores, nosebleeds, sore throat and trouble swallowing.    Eyes: Negative for visual disturbance.   Respiratory: Negative for cough, chest tightness, shortness of breath and wheezing.    Cardiovascular: Negative for chest pain, palpitations and leg swelling.   Gastrointestinal: Negative for abdominal distention, abdominal pain, blood in stool, constipation, diarrhea, nausea, rectal pain and vomiting.   Endocrine: Negative for cold intolerance and heat intolerance.   Genitourinary: Negative for difficulty urinating, dysuria, frequency and urgency.   Musculoskeletal: Negative for arthralgias, back pain, gait problem, joint swelling and myalgias.   Skin: Negative for rash.   Neurological: Negative for dizziness, tremors, syncope, weakness, light-headedness, numbness and headaches.   Hematological: Negative for  adenopathy. Does not bruise/bleed easily.   Psychiatric/Behavioral: Negative for confusion, sleep disturbance and suicidal ideas. The patient is not nervous/anxious.          Current Outpatient Prescriptions:   •  amLODIPine-benazepril (LOTREL) 10-40 MG per capsule, Take 1 capsule by mouth Daily., Disp: , Rfl:   •  aspirin 81 MG tablet, Take 81 mg by mouth Daily., Disp: , Rfl:   •  atorvastatin (LIPITOR) 40 MG tablet, Take 40 mg by mouth Daily., Disp: , Rfl:   •  Blood Glucose Monitoring Suppl (ONE TOUCH ULTRA 2) w/Device kit, , Disp: , Rfl:   •  cholecalciferol (VITAMIN D3) 1000 units tablet, Take 1,000 Units by mouth Daily., Disp: , Rfl:   •  clonazePAM (KlonoPIN) 1 MG tablet, Take 1 mg by mouth 2 (Two) Times a Day As Needed for Anxiety or Seizures., Disp: , Rfl:   •  dexamethasone (DECADRON) 4 MG tablet, Take 2 tablets in the morning the day after chemo (Day 2), then take 2 tablets twice daily on days 3 & 4.  Take with food., Disp: 30 tablet, Rfl: 3  •  Dulaglutide (TRULICITY) 1.5 MG/0.5ML solution pen-injector, Inject 1 dose under the skin 1 (One) Time Per Week., Disp: , Rfl:   •  etoposide (TOPOSAR;VEPESID) 50 MG chemo capsule, Take 9 capsules by mouth Daily. On DAY 1-3 of each 21 DAY cycle of chemotherapy, Disp: 27 capsule, Rfl: 3  •  hydrochlorothiazide (HYDRODIURIL) 50 MG tablet, Take 50 mg by mouth Daily., Disp: , Rfl:   •  HYDROcodone-acetaminophen (NORCO)  MG per tablet, Take 1 tablet by mouth Every 6 (Six) Hours As Needed for Moderate Pain ., Disp: , Rfl:   •  Insulin Glargine (TOUJEO SOLOSTAR SC), Inject 12 Units under the skin As Needed., Disp: , Rfl:   •  metFORMIN (GLUCOPHAGE) 1000 MG tablet, Take 1,000 mg by mouth 2 (Two) Times a Day With Meals., Disp: , Rfl:   •  Multiple Vitamins-Minerals (MULTIVITAL PO), Take 1 tablet by mouth Daily., Disp: , Rfl:   •  NOVOLOG FLEXPEN 100 UNIT/ML solution pen-injector sc pen, , Disp: , Rfl:   •  nystatin susp + lidocaine viscous (MAGIC MOUTHWASH) oral  "suspension, 5-10 ml swish and spit or swallow QID prn, Disp: 240 mL, Rfl: 3  •  ondansetron (ZOFRAN) 8 MG tablet, Take 1 tablet by mouth 3 (Three) Times a Day As Needed for Nausea or Vomiting., Disp: 30 tablet, Rfl: 5  •  ONE TOUCH ULTRA TEST test strip, , Disp: , Rfl:   •  ONETOUCH DELICA LANCETS 33G misc, , Disp: , Rfl:   •  oxyCODONE-acetaminophen (PERCOCET) 5-325 MG per tablet, Take 1-2 tablets by mouth Every 4 to 6 Hours As Needed., Disp: 20 tablet, Rfl: 0    PHYSICAL EXAMINATION:   /71   Pulse 91   Temp 97.8 °F (36.6 °C) (Temporal Artery )   Resp 16   Ht 180.3 cm (70.98\")   Wt 112 kg (247 lb 9.6 oz)   SpO2 97%   BMI 34.55 kg/m²    ECOG Performance Status: 1 - Symptomatic but completely ambulatory  General Appearance:  alert, cooperative, no apparent distress and appears stated age   Neurologic/Psychiatric: A&O x 3, gait steady, appropriate affect, strength 5/5 in all muscle groups   HEENT:  Normocephalic, without obvious abnormality, mucous membranes moist   Neck: Supple, symmetrical, trachea midline, no adenopathy;  No thyromegaly, masses, or tenderness   Lungs:   Clear to auscultation bilaterally; respirations regular, even, and unlabored bilaterally   Heart:  Regular rate and rhythm, no murmurs appreciated   Abdomen:   Soft, non-tender, non-distended and no organomegaly   Lymph nodes: No cervical, supraclavicular, inguinal or axillary adenopathy noted   Extremities: Normal, atraumatic; no clubbing, cyanosis, or edema    Skin: No rashes, ulcers, or suspicious lesions noted     Lab on 04/17/2018   Component Date Value Ref Range Status   • WBC 04/17/2018 6.10  3.50 - 10.80 10*3/mm3 Final   • RBC 04/17/2018 3.60* 4.20 - 5.76 10*6/mm3 Final   • Hemoglobin 04/17/2018 10.5* 13.1 - 17.5 g/dL Final   • Hematocrit 04/17/2018 32.6* 38.9 - 50.9 % Final   • RDW 04/17/2018 18.6* 11.3 - 14.5 % Final   • MCV 04/17/2018 90.5  80.0 - 99.0 fL Final   • MCH 04/17/2018 29.1  27.0 - 31.0 pg Final   • MCHC 04/17/2018 " 32.1  32.0 - 36.0 g/dL Final   • MPV 04/17/2018 7.5  6.0 - 12.0 fL Final   • Platelets 04/17/2018 256  150 - 450 10*3/mm3 Final   • Neutrophil % 04/17/2018 69.3  41.0 - 71.0 % Final   • Lymphocyte % 04/17/2018 22.9* 24.0 - 44.0 % Final   • Monocyte % 04/17/2018 7.8  0.0 - 12.0 % Final   • Neutrophils, Absolute 04/17/2018 4.20  1.50 - 8.30 10*3/mm3 Final   • Lymphocytes, Absolute 04/17/2018 1.40  0.60 - 4.80 10*3/mm3 Final   • Monocytes, Absolute 04/17/2018 0.50  0.00 - 1.00 10*3/mm3 Final        No results found.    ASSESSMENT: The patient is a very pleasant 57 y.o. male  with Merkel cell carcinoma    PROBLEM LIST:  1. Merkel cell carcinoma [id number]  A. Presented with left groin mass.   B. Status post left groin excision of 5 cm mass.   C. Final pathology revealed Merkel cell carcinoma.   D. PET scan for clinical staging done 1/17/2018 failed to show evidence of metastatic disease.   E. Started adjuvant chemotherapy with cisplatin and etoposide February 13, 2018, status post 3 cycles.   2. History of basal cell carcinoma of the skin.   3. Type 2 diabetes.   4. Hypertension.  5.  Chemotherapy-induced mouth sores  6.  Chemotherapy used nausea    PLAN:  1.  I will proceed with cycle #4 of chemotherapy as scheduled today.  2.  The patient will follow-up with me in 4 weeks.  3.  I will continue to monitor patient blood work including blood counts kidney function and liver function.  4.  I will continue Zofran as needed for chemotherapy-induced nausea.  5.  I'll continue patient on magic mouthwash as needed for treatment induced mucositis.  6.  We'll continue to monitor patient blood sugars, it has been fluctuating mostly ice cream to steroids to be given concurrently with his chemotherapy.  7.  Again I reviewed the potential side effects of this treatment including hair loss, nausea, vomiting, fatigue, bone marrow suppression, infection, allergic reaction to his regimen, and even death.   8. I will monitor patient blood  pressure while he is on chemotherapy since it might fluctuate.  9.  The patient will receive adjuvant radiation treatment to left groin after completion of chemotherapy.   10.  Future treatment options would include immunotherapy.  11.  The patient would have repeat scans after completion of chemotherapy. This will be ordered prior to return.   Anastasia Waller MD  4/17/2018

## 2018-04-17 NOTE — PROGRESS NOTES
Oral Chemotherapy Teaching      Patient Name/:  Regino Souza   1960  Oral Chemotherapy Regimen: Etoposide 200mg PO QAM + 250mg PO QPM on Days 1-3 + IV Carboplatin on Day 1 of 21 day treatment cycle  Date Started Medication:   Cycle 1: 2/15/18  Cycle 2: 3/6/18  Cycle 3: 3/27/18  Cycle 4: 18    Initial Teaching Follow Up Comments     Safety     Storage instructions (away from children; away from heat/cold, sunlight, or moisture), handling - use of gloves (caregivers), washing hands after touching pills, managing waste     “How are you storing your medications?”, reminders on storage, proper handling (caregivers using gloves, washing hands, away from children, managing waste, etc.), disposal of medication with D/C or dosage change    Discussed appropriate storage and handling of hazardous medication.  Wash hands after handling.      Adherence      patient and/or caregiver on how to take medication, take with/without food, assess their adherence potential, stress importance of adherence, ways to manage adherence (pill boxes, phone reminders, calendars), what to do if miss a dose   “How are you taking your medication?” “How are you remembering to take your medication?”, “How many doses have you missed?”, determine reasons for non-adherence (not remembering, side effects, etc), ways to improve, overadherence? Remind patient of ways to improve/maintain adherence   Etoposide to be administered on an empty stomach (no food 2 hours prior or 1 hour after administration). Plan to take oral etoposide on Day 1-3 of each cycle. Pt aware to split dose - Administer Etoposide 200mg (4 caps) by mouth in the morning and 250mg (5 caps) po in the evening. Discussed use of zofran 30 minutes prior to etoposide administration. Pt aware.Pt reports administer AM dose this morning on an empty stomach. Discussed use of Dex on Days 2-4 of each cycle. Pt aware of dosing and revommendations. FINAL cycle  to start today  4/17/18.       Side Effects/Adverse Reactions      patient on potential side effects, s/s, ways to manage, when to call MD/seek help     Determine if patient experiencing side effects, ways to manage       Miscellaneous     Food interactions, DDIs, financial issues Determine if patient started any new medications since being placed on oral chemo (analyze for DDI)      Additional Notes:Patient seen in infusion, all questions answered and address. Pt here today for Cycle 4 of planned 4 cycles. Aware to call with any additional questions or concerns.

## 2018-04-19 DIAGNOSIS — C4A.9 MERKEL CELL CARCINOMA (HCC): ICD-10-CM

## 2018-04-19 RX ORDER — ETOPOSIDE 50 MG/1
CAPSULE ORAL
Qty: 27 CAPSULE | Refills: 0 | OUTPATIENT
Start: 2018-04-19

## 2018-04-20 ENCOUNTER — TELEPHONE (OUTPATIENT)
Dept: ONCOLOGY | Facility: HOSPITAL | Age: 58
End: 2018-04-20

## 2018-04-20 NOTE — TELEPHONE ENCOUNTER
Patient has completed treatment with Etoposide. Additional refills not needed at this time ;)     ----- Message from Marina Scott RN sent at 4/20/2018  3:48 PM EDT -----  Regarding: FW: SURESH - RX REFILL   Contact: [phone]  Forwarded to pharmacy    ----- Message -----  From: Maegan Olivarez  Sent: 4/20/2018   3:43 PM  To: [facility]  Subject: SURESH - RX REFILL                                SMILEY WITH [facility] CALLED LOOKING FOR A RX REFILL ON ETOPOSIDE 50 MG.

## 2018-05-10 ENCOUNTER — HOSPITAL ENCOUNTER (OUTPATIENT)
Dept: CT IMAGING | Facility: HOSPITAL | Age: 58
Discharge: HOME | End: 2018-05-10
Attending: INTERNAL MEDICINE | Admitting: INTERNAL MEDICINE

## 2018-05-10 DIAGNOSIS — C4A.9 MERKEL CELL CANCER (HCC): ICD-10-CM

## 2018-05-10 PROCEDURE — 25010000002 IOPAMIDOL 61 % SOLUTION: Performed by: INTERNAL MEDICINE

## 2018-05-10 PROCEDURE — 71260 CT THORAX DX C+: CPT

## 2018-05-10 PROCEDURE — 74177 CT ABD & PELVIS W/CONTRAST: CPT

## 2018-05-10 RX ADMIN — IOPAMIDOL 95 ML: 612 INJECTION, SOLUTION INTRAVENOUS at 13:26

## 2018-05-15 ENCOUNTER — OFFICE VISIT (OUTPATIENT)
Dept: ONCOLOGY | Facility: CLINIC | Age: 58
End: 2018-05-15

## 2018-05-15 VITALS
BODY MASS INDEX: 35.18 KG/M2 | HEIGHT: 71 IN | DIASTOLIC BLOOD PRESSURE: 80 MMHG | WEIGHT: 251.3 LBS | HEART RATE: 95 BPM | RESPIRATION RATE: 16 BRPM | OXYGEN SATURATION: 100 % | SYSTOLIC BLOOD PRESSURE: 159 MMHG | TEMPERATURE: 97.9 F

## 2018-05-15 DIAGNOSIS — C4A.9 MERKEL CELL CANCER (HCC): Primary | ICD-10-CM

## 2018-05-15 PROCEDURE — 99214 OFFICE O/P EST MOD 30 MIN: CPT | Performed by: INTERNAL MEDICINE

## 2018-05-15 NOTE — PROGRESS NOTES
DATE OF VISIT: 5/15/2018    REASON FOR VISIT: Followup for Merkel cell carcinoma     HISTORY OF PRESENT ILLNESS: The patient is a very pleasant 57 y.o. 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  Whole body PET scan done on January 17, 2018 did not show any evidence of metastatic disease.  The patient was started on adjuvant chemotherapy with cisplatin and etoposide February 13, 2018.  Completed 4 cycles on April 20, 2018 The patient is here today for scheduled follow-up visit.    SUBJECTIVE: Patient is here today with his sister.  He was able to handle chemotherapy fairly well.  He is complaining of mild pain at the IV infusion site of a warm compression has been helping.  Had any weight loss no headaches.    PAST MEDICAL HISTORY/SOCIAL HISTORY/FAMILY HISTORY: Unchanged from my prior documentation done on February 6, 2018    Review of Systems   Constitutional: Positive for fatigue. Negative for activity change, appetite change, chills, fever and unexpected weight change.   HENT: Negative for hearing loss, mouth sores, nosebleeds, sore throat and trouble swallowing.    Eyes: Negative for visual disturbance.   Respiratory: Negative for cough, chest tightness, shortness of breath and wheezing.    Cardiovascular: Negative for chest pain, palpitations and leg swelling.   Gastrointestinal: Negative for abdominal distention, abdominal pain, blood in stool, constipation, diarrhea, nausea, rectal pain and vomiting.   Endocrine: Negative for cold intolerance and heat intolerance.   Genitourinary: Negative for difficulty urinating, dysuria, frequency and urgency.   Musculoskeletal: Negative for arthralgias, back pain, gait problem, joint swelling and myalgias.   Skin: Negative for rash.   Neurological: Negative for dizziness, tremors, syncope, weakness, light-headedness, numbness and headaches.   Hematological: Negative for adenopathy. Does not bruise/bleed easily.    Psychiatric/Behavioral: Negative for confusion, sleep disturbance and suicidal ideas. The patient is not nervous/anxious.          Current Outpatient Prescriptions:   •  amLODIPine-benazepril (LOTREL) 10-40 MG per capsule, Take 1 capsule by mouth Daily., Disp: , Rfl:   •  aspirin 81 MG tablet, Take 81 mg by mouth Daily., Disp: , Rfl:   •  atorvastatin (LIPITOR) 40 MG tablet, Take 40 mg by mouth Daily., Disp: , Rfl:   •  Blood Glucose Monitoring Suppl (ONE TOUCH ULTRA 2) w/Device kit, , Disp: , Rfl:   •  cholecalciferol (VITAMIN D3) 1000 units tablet, Take 1,000 Units by mouth Daily., Disp: , Rfl:   •  clonazePAM (KlonoPIN) 1 MG tablet, Take 1 mg by mouth 2 (Two) Times a Day As Needed for Anxiety or Seizures., Disp: , Rfl:   •  Dulaglutide (TRULICITY) 1.5 MG/0.5ML solution pen-injector, Inject 1 dose under the skin 1 (One) Time Per Week., Disp: , Rfl:   •  etoposide (TOPOSAR;VEPESID) 50 MG chemo capsule, Take 9 capsules by mouth Daily. On DAY 1-3 of each 21 DAY cycle of chemotherapy, Disp: 27 capsule, Rfl: 3  •  hydrochlorothiazide (HYDRODIURIL) 50 MG tablet, Take 50 mg by mouth Daily., Disp: , Rfl:   •  HYDROcodone-acetaminophen (NORCO)  MG per tablet, Take 1 tablet by mouth Every 6 (Six) Hours As Needed for Moderate Pain ., Disp: , Rfl:   •  Insulin Glargine (TOUJEO SOLOSTAR SC), Inject 12 Units under the skin As Needed., Disp: , Rfl:   •  metFORMIN (GLUCOPHAGE) 1000 MG tablet, Take 1,000 mg by mouth 2 (Two) Times a Day With Meals., Disp: , Rfl:   •  Multiple Vitamins-Minerals (MULTIVITAL PO), Take 1 tablet by mouth Daily., Disp: , Rfl:   •  NOVOLOG FLEXPEN 100 UNIT/ML solution pen-injector sc pen, , Disp: , Rfl:   •  nystatin susp + lidocaine viscous (MAGIC MOUTHWASH) oral suspension, 5-10 ml swish and spit or swallow QID prn, Disp: 240 mL, Rfl: 3  •  ONE TOUCH ULTRA TEST test strip, , Disp: , Rfl:   •  ONETOUCH DELICA LANCETS 33G misc, , Disp: , Rfl:   •  oxyCODONE-acetaminophen (PERCOCET) 5-325 MG per  "tablet, Take 1-2 tablets by mouth Every 4 to 6 Hours As Needed., Disp: 20 tablet, Rfl: 0  •  dexamethasone (DECADRON) 4 MG tablet, Take 2 tablets in the morning the day after chemo (Day 2), then take 2 tablets twice daily on days 3 & 4.  Take with food., Disp: 30 tablet, Rfl: 3  •  ondansetron (ZOFRAN) 8 MG tablet, Take 1 tablet by mouth 3 (Three) Times a Day As Needed for Nausea or Vomiting., Disp: 30 tablet, Rfl: 5    PHYSICAL EXAMINATION:   /80   Pulse 95   Temp 97.9 °F (36.6 °C) (Temporal Artery )   Resp 16   Ht 180.3 cm (70.98\")   Wt 114 kg (251 lb 4.8 oz)   SpO2 100%   BMI 35.07 kg/m²    ECOG Performance Status: 1 - Symptomatic but completely ambulatory  General Appearance:  alert, cooperative, no apparent distress and appears stated age   Neurologic/Psychiatric: A&O x 3, gait steady, appropriate affect, strength 5/5 in all muscle groups   HEENT:  Normocephalic, without obvious abnormality, mucous membranes moist   Neck: Supple, symmetrical, trachea midline, no adenopathy;  No thyromegaly, masses, or tenderness   Lungs:   Clear to auscultation bilaterally; respirations regular, even, and unlabored bilaterally   Heart:  Regular rate and rhythm, no murmurs appreciated   Abdomen:   Soft, non-tender, non-distended and no organomegaly   Lymph nodes: No cervical, supraclavicular, inguinal or axillary adenopathy noted   Extremities: Normal, atraumatic; no clubbing, cyanosis, or edema    Skin: No rashes, ulcers, or suspicious lesions noted     No visits with results within 2 Week(s) from this visit.   Latest known visit with results is:   Lab on 04/17/2018   Component Date Value Ref Range Status   • Glucose 04/17/2018 192* 70 - 100 mg/dL Final   • BUN 04/17/2018 16  9 - 23 mg/dL Final   • Creatinine 04/17/2018 0.90  0.60 - 1.30 mg/dL Final   • Sodium 04/17/2018 138  132 - 146 mmol/L Final   • Potassium 04/17/2018 4.0  3.5 - 5.5 mmol/L Final   • Chloride 04/17/2018 101  99 - 109 mmol/L Final   • CO2 " 04/17/2018 29.0  20.0 - 31.0 mmol/L Final   • Calcium 04/17/2018 9.1  8.7 - 10.4 mg/dL Final   • Total Protein 04/17/2018 6.6  5.7 - 8.2 g/dL Final   • Albumin 04/17/2018 4.30  3.20 - 4.80 g/dL Final   • ALT (SGPT) 04/17/2018 15  7 - 40 U/L Final   • AST (SGOT) 04/17/2018 14  0 - 33 U/L Final   • Alkaline Phosphatase 04/17/2018 63  25 - 100 U/L Final   • Total Bilirubin 04/17/2018 0.9  0.3 - 1.2 mg/dL Final   • eGFR Non African Amer 04/17/2018 87  >60 mL/min/1.73 Final   • Globulin 04/17/2018 2.3  gm/dL Final   • A/G Ratio 04/17/2018 1.9  1.5 - 2.5 g/dL Final   • BUN/Creatinine Ratio 04/17/2018 17.8  7.0 - 25.0 Final   • Anion Gap 04/17/2018 8.0  3.0 - 11.0 mmol/L Final   • Magnesium 04/17/2018 1.8  1.3 - 2.7 mg/dL Final   • WBC 04/17/2018 6.10  3.50 - 10.80 10*3/mm3 Final   • RBC 04/17/2018 3.60* 4.20 - 5.76 10*6/mm3 Final   • Hemoglobin 04/17/2018 10.5* 13.1 - 17.5 g/dL Final   • Hematocrit 04/17/2018 32.6* 38.9 - 50.9 % Final   • RDW 04/17/2018 18.6* 11.3 - 14.5 % Final   • MCV 04/17/2018 90.5  80.0 - 99.0 fL Final   • MCH 04/17/2018 29.1  27.0 - 31.0 pg Final   • MCHC 04/17/2018 32.1  32.0 - 36.0 g/dL Final   • MPV 04/17/2018 7.5  6.0 - 12.0 fL Final   • Platelets 04/17/2018 256  150 - 450 10*3/mm3 Final   • Neutrophil % 04/17/2018 69.3  41.0 - 71.0 % Final   • Lymphocyte % 04/17/2018 22.9* 24.0 - 44.0 % Final   • Monocyte % 04/17/2018 7.8  0.0 - 12.0 % Final   • Neutrophils, Absolute 04/17/2018 4.20  1.50 - 8.30 10*3/mm3 Final   • Lymphocytes, Absolute 04/17/2018 1.40  0.60 - 4.80 10*3/mm3 Final   • Monocytes, Absolute 04/17/2018 0.50  0.00 - 1.00 10*3/mm3 Final      Ct Chest With Contrast    Result Date: 5/11/2018  Narrative: EXAMINATION: CT CHEST W CONTRAST-  INDICATION: C4A.9-Merkel cell carcinoma, unspecified; followup and staging Merkel cell tumor and staging malignancy, previous left groin lesion and resection.  TECHNIQUE: CT data set of the chest and mediastinum was performed with intravenous contrast  enhancement.  The radiation dose reduction device was turned on for each scan per the ALARA (As Low as Reasonably Achievable) protocol.  COMPARISON: Compared to the most recent CT datasets of the chest which were associated with the patient's PET/CT datasets of 01/17/2018.  FINDINGS: 1. There is thyroidal enlargement with irregular attenuation throughout the thyroid and there is a large calcification in the right lobe of the thyroid. This configuration is known to the clinicians and is essentially unchanged without evidence of further thyroid progression or nodularity when compared to previous studies. 2. The thoracic inlet is clear. Patient has some small lymph nodes in the aortopulmonary window which are completely stable from 01/17/2018. There is minimal gynecomastia. Patient has normal lymph nodes in the axillary areas. Remainder of the mediastinum is also clear. There is no evidence of hilar mass or hilar asymmetry. Central pulmonary emboli are not identified. Cardiac chambers are normal size. There is trace fluid in the anterior pericardial compartment which is likely physiologic. 3. Extended window datasets of the lungs are negative. There is no evidence of dominant mass, nodule, consolidation or free fluid. Osseous structures of the chest are intact.      Impression: 1. Stable findings in the chest and mediastinum and in the thyroidal region when compared to previous studies of 01/17/2018. 2. Recurrent or progressive neoplastic process is not identified. There is no evidence of active or new metastatic disease in the chest, pulmonary parenchymal or mediastinal areas. The lobular enlarged thyroid is morphologically stable. 3. Therefore, no evidence of recurrent or progressive disease is identified in the chest or mediastinum.  D:  05/11/2018 E:  05/11/2018  This report was finalized on 5/11/2018 1:01 PM by Dr. Robert Sal MD.      Ct Abdomen Pelvis With Contrast    Result Date: 5/11/2018  Narrative:  EXAMINATION: CT ABDOMEN AND PELVIS W CONTRAST-05/10/2018:  INDICATION: F/U scan; C4A.9-Merkel cell carcinoma, unspecified, followup and restaging malignancy, Merkel cell tumor resected left groin. Followup and restaging.  TECHNIQUE:  CT data set of the abdomen and pelvis was performed with intravenous contrast enhancement.  The radiation dose reduction device was turned on for each scan per the ALARA (As Low as Reasonably Achievable) protocol.  COMPARISON:  Comparison is made to previous PET CT data sets performed 01/17/2018.  FINDINGS: 1.   Small studded lymph nodes are seen in the right retrocrural space, slightly increased from previous exams, however, the change is quite subtle. Nonetheless, this area should be monitored for surveillance and for change.  2.  The liver is intrinsically within normal limits. Liver metastasis is not identified. Portal veins and alessandro hepatis are normal. The spleen is unremarkable. Splenic mass is not appreciated.  3. The patient has adrenal hyperplasia, stable from previous exams. No change in the size of the adrenal glands are noted.  4. The retrocrural space, retroperitoneum, mesentery and omentum are clear. The pancreas is negative for mass, cyst or stranding. Gallbladder is negative for abnormal radiodensities. Kidneys are negative for stone, mass or obstruction. Atherosclerotic calcific plaque is seen in the aortoiliac system without evidence of aneurysm.  In the mid abdominal retroperitoneal, in the aortocaval space, there is a lymph node which is slightly larger than previously noted but still is within normal limits by size criteria. This should also be monitored with surveillance imaging to document stability.  5.  An area of previous resection of the primary lesion, this area has healed nicely. There is no evidence of recurrent mass or pathologic adenopathy in the inguinal areas. Lateral pelvic sidewalls and deep internal iliac pelvic chains are normal. There is no  ascites, stranding or caking. The patient has ankylosing spondylitis. There is fusion of the thoracolumbar vertebral bodies consistent with that diagnosis. There is also sclerosis and erosion of the SI joints, likely benign.      Impression: 1.  Very slight increase in the retrocrural lymph nodes on the right when compared to previous studies. This area should be carefully followed. There is slight increase in a singular lymph node in the aortocaval space of the mid abdomen retroperitoneum, also deserving of followup with careful attention to measurement. 2.  Otherwise, the primary site of resection in the patient's left groin has healed and there is no evidence of recurrent nodule or mass. There is no evidence of visceral tumor, pathologic stranding, caking, ascites or evidence of definite recurrent malignancy. 3.  Careful followup with continued surveillance is therefore recommended. Benign findings of ankylosing spondylitis are noted as described.  D:  05/11/2018 E:  05/11/2018  This report was finalized on 5/11/2018 1:32 PM by Dr. Robert Sal MD.    (  No results found.    ASSESSMENT: The patient is a very pleasant 57 y.o. male  with Merkel cell carcinoma    PROBLEM LIST:  1. Merkel cell carcinoma [id number]  A. Presented with left groin mass.   B. Status post left groin excision of 5 cm mass.   C. Final pathology revealed Merkel cell carcinoma.   D. PET scan for clinical staging done 1/17/2018 failed to show evidence of metastatic disease.   E. Started adjuvant chemotherapy with cisplatin and etoposide February 13, 2018, status post 4 cycles, completed April 20, 2018.   2. History of basal cell carcinoma of the skin.   3. Type 2 diabetes.   4. Hypertension.  5.  Chemotherapy-induced mouth sores  6.  Chemotherapy used nausea    PLAN:  1.  I did go over the CAT scan results with the patient and his sister, reviewed the films myself, I reassured the patient is not evidence of residual or recurrent disease.  There  were minor abnormalities noted on the CAT scan which we will follow closely with repeat CT scan in 4 months.  2.  The patient will follow-up with me in 4 months.  3.  I will continue to monitor patient blood work including blood counts kidney function and liver function.  Should be ordered prior to return.  4.  I will continue Zofran as needed for nausea.  5.  I discussed the case with Dr. Carlos over the phone to coordinate patient care.  He will be seen by radiation oncology soon to start involved field radiation.   6.  Future treatment options would include immunotherapy.  7.  We'll continue Lotrel and hydrochlorothiazide for hypertension  8.  We'll continue metformin as well as insulin for type 2 diabetes  Anastasia Waller MD  5/15/2018

## 2018-05-21 ENCOUNTER — HOSPITAL ENCOUNTER (OUTPATIENT)
Dept: RADIATION ONCOLOGY | Facility: HOSPITAL | Age: 58
Setting detail: RADIATION/ONCOLOGY SERIES
Discharge: HOME | End: 2018-05-21

## 2018-05-21 ENCOUNTER — OFFICE VISIT (OUTPATIENT)
Dept: RADIATION ONCOLOGY | Facility: HOSPITAL | Age: 58
End: 2018-05-21

## 2018-05-21 VITALS
SYSTOLIC BLOOD PRESSURE: 152 MMHG | TEMPERATURE: 97.8 F | OXYGEN SATURATION: 98 % | BODY MASS INDEX: 34.97 KG/M2 | RESPIRATION RATE: 18 BRPM | WEIGHT: 250.6 LBS | HEART RATE: 94 BPM | DIASTOLIC BLOOD PRESSURE: 74 MMHG

## 2018-05-21 DIAGNOSIS — C4A.9 MERKEL CELL CANCER (HCC): ICD-10-CM

## 2018-05-21 PROCEDURE — G0463 HOSPITAL OUTPT CLINIC VISIT: HCPCS | Performed by: RADIOLOGY

## 2018-05-21 NOTE — PROGRESS NOTES
RE-EVALUATION    PATIENT:                                                      Regino Souza  :                                                          1960  DATE:                          2018   DIAGNOSIS:     Merkel cell       BRIEF HISTORY:  The patient is a very pleasant 57 y.o. male  with a known history of a presumed metastatic Merkel cell carcinoma to the left groin.  He originally presented in late  and underwent a left groin excision of a 5 cm lymph node consistent with the aforementioned primary.  A true skin source has still not been identified.  He was last seen in the radiation oncology clinic by Dr. Gay Carlos on 2018.  He was recommended to undergo adjuvant external beam radiation therapy directed to the left groin, but he was also scheduled to undergo systemic chemotherapy.  He now returns to our clinic for reevaluation to discuss radiation treatment as well as planning now that he has completed his chemotherapy.  His 4th and last cycle of cisplatin and etoposide was administered he describes only slight edema in his left lower leg on 2018.  He admits to having a slight degree of nausea as well as some generalized fatigue, but otherwise feels like he tolerated his chemotherapy very well and feels almost back to normal.  He otherwise denies any symptoms of pain, new skin lesions, or any other abnormalities.  He also recently underwent repeat CT scans that demonstrate no evidence of regional or distant disease.    No Known Allergies    Review of Systems   Cardiovascular: Positive for leg swelling (left ankle- per pt).   Musculoskeletal: Positive for back pain (chronic- per pt).   Hematological: Bruises/bleeds easily.   All other systems reviewed and are negative.          Objective   VITAL SIGNS:   Vitals:    18 0908   BP: 152/74   Pulse: 94   Resp: 18   Temp: 97.8 °F (36.6 °C)   TempSrc: Temporal Artery    SpO2: 98%   Weight: 114 kg (250 lb 9.6 oz)    PainSc: 0-No pain        KPS 90%    Physical Exam   Constitutional: He is oriented to person, place, and time. He appears well-developed and well-nourished. No distress.   HENT:   Head: Normocephalic and atraumatic.   Treatment related alopecia   Eyes: Conjunctivae and EOM are normal. Pupils are equal, round, and reactive to light.   Neck: Normal range of motion. Neck supple.   Cardiovascular: Normal rate, regular rhythm and normal heart sounds.    No murmur heard.  Pulmonary/Chest: Effort normal and breath sounds normal.   Abdominal: Soft. Bowel sounds are normal. He exhibits no distension and no mass. There is no tenderness. There is no guarding.   Genitourinary:   Genitourinary Comments: He has a well-healed linear incision along the left groin with no abnormal studding or nodularity.  There are no other significantly palpable lymph nodes.   Musculoskeletal: Normal range of motion. He exhibits edema.   He has grade 1 edema limited to the left ankle   Lymphadenopathy:     He has no cervical adenopathy.   Neurological: He is alert and oriented to person, place, and time.   Skin: Skin is warm and dry.   Psychiatric: He has a normal mood and affect. His behavior is normal.            The following portions of the patient's history were reviewed and updated as appropriate: allergies, current medications, past family history, past medical history, past social history, past surgical history and problem list.    Diagnoses and all orders for this visit:    Merkel cell cancer      IMPRESSION:  Mr. Souza is a 57-year-old gentleman who presents today for evaluation of a TX N1 M0 Merkel cell carcinoma involving the left groin.      RECOMMENDATIONS: He has now completed a course of systemic chemotherapy in an effort to minimize his risk of recurrence in the left groin, we have recommended that he undergo a course of involved field radiation therapy to treat the left inguinal lymphatics.  I spent approximately 30 minutes  today with Mr. Souza and his family member discussing the rationale, indications, and side effects associated with radiation therapy to the left groin.  He was also seen and examined with Dr. Fran Feliciano, who participated in formulation of the treatment plan.  After a full explanation of risks and benefits, Mr. Souza was agreeable and willing to undergo treatment.  I plan to treat the left groin to a total dose of 50 gray in 25 fractions.  We signed treatment consents and I scheduled him to return to my clinic on Wednesday, May 23, 2018 for him to undergo a CT simulation and treatment planning session.  I anticipate that he should be ready to begin treatment shortly following.  Thank you for allowing me to participate in the care of this individual.         Vance Rodgers MD

## 2018-05-21 NOTE — PROGRESS NOTES
I saw and evaluated the patient. I agree with the findings and the plan of care as documented in Dr. Rodgers's note.  Patient has no evidence of local recurrence in the left groin resection bed.  Seroma has resolved.  Unknown primary site.  He's planned for adjuvant radiotherapy and will return in 2 days for simulation.

## 2018-05-23 ENCOUNTER — HOSPITAL ENCOUNTER (OUTPATIENT)
Dept: RADIATION ONCOLOGY | Facility: HOSPITAL | Age: 58
Discharge: HOME | End: 2018-05-23

## 2018-05-23 PROCEDURE — 77334 RADIATION TREATMENT AID(S): CPT | Performed by: RADIOLOGY

## 2018-05-23 PROCEDURE — 77290 THER RAD SIMULAJ FIELD CPLX: CPT | Performed by: RADIOLOGY

## 2018-05-24 PROCEDURE — 77307 TELETHX ISODOSE PLAN CPLX: CPT | Performed by: RADIOLOGY

## 2018-05-24 PROCEDURE — 77334 RADIATION TREATMENT AID(S): CPT | Performed by: RADIOLOGY

## 2018-05-30 ENCOUNTER — HOSPITAL ENCOUNTER (OUTPATIENT)
Dept: RADIATION ONCOLOGY | Facility: HOSPITAL | Age: 58
Discharge: HOME | End: 2018-05-30

## 2018-05-30 PROCEDURE — 77290 THER RAD SIMULAJ FIELD CPLX: CPT | Performed by: RADIOLOGY

## 2018-05-30 PROCEDURE — 77334 RADIATION TREATMENT AID(S): CPT | Performed by: RADIOLOGY

## 2018-05-31 ENCOUNTER — HOSPITAL ENCOUNTER (OUTPATIENT)
Dept: RADIATION ONCOLOGY | Facility: HOSPITAL | Age: 58
Discharge: HOME | End: 2018-05-31

## 2018-05-31 PROCEDURE — 77336 RADIATION PHYSICS CONSULT: CPT | Performed by: RADIOLOGY

## 2018-05-31 PROCEDURE — 77412 RADIATION TX DELIVERY LVL 3: CPT | Performed by: RADIOLOGY

## 2018-06-01 ENCOUNTER — HOSPITAL ENCOUNTER (OUTPATIENT)
Dept: RADIATION ONCOLOGY | Facility: HOSPITAL | Age: 58
Discharge: HOME | End: 2018-06-01

## 2018-06-01 ENCOUNTER — HOSPITAL ENCOUNTER (OUTPATIENT)
Dept: RADIATION ONCOLOGY | Facility: HOSPITAL | Age: 58
Setting detail: RADIATION/ONCOLOGY SERIES
Discharge: HOME | End: 2018-06-01

## 2018-06-01 PROCEDURE — 77412 RADIATION TX DELIVERY LVL 3: CPT | Performed by: RADIOLOGY

## 2018-06-04 ENCOUNTER — HOSPITAL ENCOUNTER (OUTPATIENT)
Dept: RADIATION ONCOLOGY | Facility: HOSPITAL | Age: 58
Discharge: HOME | End: 2018-06-04

## 2018-06-04 PROCEDURE — 77412 RADIATION TX DELIVERY LVL 3: CPT | Performed by: RADIOLOGY

## 2018-06-05 ENCOUNTER — HOSPITAL ENCOUNTER (OUTPATIENT)
Dept: RADIATION ONCOLOGY | Facility: HOSPITAL | Age: 58
Discharge: HOME | End: 2018-06-05

## 2018-06-05 PROCEDURE — 77412 RADIATION TX DELIVERY LVL 3: CPT | Performed by: RADIOLOGY

## 2018-06-06 ENCOUNTER — HOSPITAL ENCOUNTER (OUTPATIENT)
Dept: RADIATION ONCOLOGY | Facility: HOSPITAL | Age: 58
Discharge: HOME | End: 2018-06-06

## 2018-06-06 VITALS — WEIGHT: 253.2 LBS | BODY MASS INDEX: 35.33 KG/M2

## 2018-06-06 PROCEDURE — 77412 RADIATION TX DELIVERY LVL 3: CPT | Performed by: RADIOLOGY

## 2018-06-06 PROCEDURE — 77417 THER RADIOLOGY PORT IMAGE(S): CPT | Performed by: RADIOLOGY

## 2018-06-07 ENCOUNTER — HOSPITAL ENCOUNTER (OUTPATIENT)
Dept: RADIATION ONCOLOGY | Facility: HOSPITAL | Age: 58
Discharge: HOME | End: 2018-06-07

## 2018-06-07 PROCEDURE — 77412 RADIATION TX DELIVERY LVL 3: CPT | Performed by: RADIOLOGY

## 2018-06-07 PROCEDURE — 77336 RADIATION PHYSICS CONSULT: CPT | Performed by: RADIOLOGY

## 2018-06-08 ENCOUNTER — HOSPITAL ENCOUNTER (OUTPATIENT)
Dept: RADIATION ONCOLOGY | Facility: HOSPITAL | Age: 58
Discharge: HOME | End: 2018-06-08

## 2018-06-08 PROCEDURE — 77412 RADIATION TX DELIVERY LVL 3: CPT | Performed by: RADIOLOGY

## 2018-06-11 ENCOUNTER — HOSPITAL ENCOUNTER (OUTPATIENT)
Dept: RADIATION ONCOLOGY | Facility: HOSPITAL | Age: 58
Discharge: HOME | End: 2018-06-11

## 2018-06-11 PROCEDURE — 77412 RADIATION TX DELIVERY LVL 3: CPT | Performed by: RADIOLOGY

## 2018-06-12 ENCOUNTER — HOSPITAL ENCOUNTER (OUTPATIENT)
Dept: RADIATION ONCOLOGY | Facility: HOSPITAL | Age: 58
Discharge: HOME | End: 2018-06-12

## 2018-06-12 PROCEDURE — 77412 RADIATION TX DELIVERY LVL 3: CPT | Performed by: RADIOLOGY

## 2018-06-13 ENCOUNTER — HOSPITAL ENCOUNTER (OUTPATIENT)
Dept: RADIATION ONCOLOGY | Facility: HOSPITAL | Age: 58
Discharge: HOME | End: 2018-06-13

## 2018-06-13 VITALS — WEIGHT: 250.3 LBS | BODY MASS INDEX: 34.93 KG/M2

## 2018-06-13 PROCEDURE — 77417 THER RADIOLOGY PORT IMAGE(S): CPT | Performed by: RADIOLOGY

## 2018-06-13 PROCEDURE — 77412 RADIATION TX DELIVERY LVL 3: CPT | Performed by: RADIOLOGY

## 2018-06-14 ENCOUNTER — HOSPITAL ENCOUNTER (OUTPATIENT)
Dept: RADIATION ONCOLOGY | Facility: HOSPITAL | Age: 58
Discharge: HOME | End: 2018-06-14

## 2018-06-14 PROCEDURE — 77336 RADIATION PHYSICS CONSULT: CPT | Performed by: RADIOLOGY

## 2018-06-14 PROCEDURE — 77412 RADIATION TX DELIVERY LVL 3: CPT | Performed by: RADIOLOGY

## 2018-06-15 ENCOUNTER — HOSPITAL ENCOUNTER (OUTPATIENT)
Dept: RADIATION ONCOLOGY | Facility: HOSPITAL | Age: 58
Discharge: HOME | End: 2018-06-15

## 2018-06-15 PROCEDURE — 77412 RADIATION TX DELIVERY LVL 3: CPT | Performed by: RADIOLOGY

## 2018-06-18 ENCOUNTER — HOSPITAL ENCOUNTER (OUTPATIENT)
Dept: RADIATION ONCOLOGY | Facility: HOSPITAL | Age: 58
Discharge: HOME | End: 2018-06-18

## 2018-06-18 PROCEDURE — 77412 RADIATION TX DELIVERY LVL 3: CPT | Performed by: RADIOLOGY

## 2018-06-19 ENCOUNTER — HOSPITAL ENCOUNTER (OUTPATIENT)
Dept: RADIATION ONCOLOGY | Facility: HOSPITAL | Age: 58
Discharge: HOME | End: 2018-06-19

## 2018-06-19 PROCEDURE — 77412 RADIATION TX DELIVERY LVL 3: CPT | Performed by: RADIOLOGY

## 2018-06-20 ENCOUNTER — HOSPITAL ENCOUNTER (OUTPATIENT)
Dept: RADIATION ONCOLOGY | Facility: HOSPITAL | Age: 58
Discharge: HOME | End: 2018-06-20

## 2018-06-20 VITALS — WEIGHT: 252.2 LBS | BODY MASS INDEX: 35.19 KG/M2

## 2018-06-20 PROCEDURE — 77417 THER RADIOLOGY PORT IMAGE(S): CPT | Performed by: RADIOLOGY

## 2018-06-20 PROCEDURE — 77412 RADIATION TX DELIVERY LVL 3: CPT | Performed by: RADIOLOGY

## 2018-06-21 ENCOUNTER — HOSPITAL ENCOUNTER (OUTPATIENT)
Dept: RADIATION ONCOLOGY | Facility: HOSPITAL | Age: 58
Discharge: HOME | End: 2018-06-21

## 2018-06-21 PROCEDURE — 77412 RADIATION TX DELIVERY LVL 3: CPT | Performed by: RADIOLOGY

## 2018-06-21 PROCEDURE — 77336 RADIATION PHYSICS CONSULT: CPT | Performed by: RADIOLOGY

## 2018-06-22 ENCOUNTER — HOSPITAL ENCOUNTER (OUTPATIENT)
Dept: RADIATION ONCOLOGY | Facility: HOSPITAL | Age: 58
Discharge: HOME | End: 2018-06-22

## 2018-06-22 PROCEDURE — 77412 RADIATION TX DELIVERY LVL 3: CPT | Performed by: RADIOLOGY

## 2018-06-25 ENCOUNTER — HOSPITAL ENCOUNTER (OUTPATIENT)
Dept: RADIATION ONCOLOGY | Facility: HOSPITAL | Age: 58
Discharge: HOME | End: 2018-06-25

## 2018-06-25 VITALS — BODY MASS INDEX: 36.11 KG/M2 | WEIGHT: 258.8 LBS

## 2018-06-25 PROCEDURE — 77412 RADIATION TX DELIVERY LVL 3: CPT | Performed by: RADIOLOGY

## 2018-06-26 ENCOUNTER — HOSPITAL ENCOUNTER (OUTPATIENT)
Dept: RADIATION ONCOLOGY | Facility: HOSPITAL | Age: 58
Discharge: HOME | End: 2018-06-26

## 2018-06-26 PROCEDURE — 77412 RADIATION TX DELIVERY LVL 3: CPT | Performed by: RADIOLOGY

## 2018-06-27 ENCOUNTER — HOSPITAL ENCOUNTER (OUTPATIENT)
Dept: RADIATION ONCOLOGY | Facility: HOSPITAL | Age: 58
Discharge: HOME | End: 2018-06-27

## 2018-06-27 PROCEDURE — 77417 THER RADIOLOGY PORT IMAGE(S): CPT | Performed by: RADIOLOGY

## 2018-06-27 PROCEDURE — 77412 RADIATION TX DELIVERY LVL 3: CPT | Performed by: RADIOLOGY

## 2018-06-28 ENCOUNTER — HOSPITAL ENCOUNTER (OUTPATIENT)
Dept: RADIATION ONCOLOGY | Facility: HOSPITAL | Age: 58
Discharge: HOME | End: 2018-06-28

## 2018-06-28 PROCEDURE — 77336 RADIATION PHYSICS CONSULT: CPT | Performed by: RADIOLOGY

## 2018-06-28 PROCEDURE — 77412 RADIATION TX DELIVERY LVL 3: CPT | Performed by: RADIOLOGY

## 2018-06-29 ENCOUNTER — HOSPITAL ENCOUNTER (OUTPATIENT)
Dept: RADIATION ONCOLOGY | Facility: HOSPITAL | Age: 58
Discharge: HOME | End: 2018-06-29

## 2018-06-29 PROCEDURE — 77412 RADIATION TX DELIVERY LVL 3: CPT | Performed by: RADIOLOGY

## 2018-07-02 ENCOUNTER — HOSPITAL ENCOUNTER (OUTPATIENT)
Dept: RADIATION ONCOLOGY | Facility: HOSPITAL | Age: 58
Setting detail: RADIATION/ONCOLOGY SERIES
Discharge: HOME | End: 2018-07-02

## 2018-07-02 ENCOUNTER — HOSPITAL ENCOUNTER (OUTPATIENT)
Dept: RADIATION ONCOLOGY | Facility: HOSPITAL | Age: 58
Discharge: HOME | End: 2018-07-02

## 2018-07-02 VITALS — WEIGHT: 257.9 LBS | BODY MASS INDEX: 35.99 KG/M2

## 2018-07-02 PROCEDURE — 77412 RADIATION TX DELIVERY LVL 3: CPT | Performed by: RADIOLOGY

## 2018-07-03 ENCOUNTER — HOSPITAL ENCOUNTER (OUTPATIENT)
Dept: RADIATION ONCOLOGY | Facility: HOSPITAL | Age: 58
Discharge: HOME | End: 2018-07-03

## 2018-07-03 PROCEDURE — 77412 RADIATION TX DELIVERY LVL 3: CPT | Performed by: RADIOLOGY

## 2018-07-05 ENCOUNTER — HOSPITAL ENCOUNTER (OUTPATIENT)
Dept: RADIATION ONCOLOGY | Facility: HOSPITAL | Age: 58
Discharge: HOME | End: 2018-07-05

## 2018-07-05 PROCEDURE — 77412 RADIATION TX DELIVERY LVL 3: CPT | Performed by: RADIOLOGY

## 2018-07-16 NOTE — THERAPY DISCHARGE NOTE
DATE OF COMPLETION: 7/5/2018   DIAGNOSIS: Merkel Cell Carcinoma, Cancer Staging  Stage (cTX, cN1, cM0)      REFERRING: Anastasia Waller MD        Regino Flynn completed radiation therapy today.      BACKGROUND: Regino Souza is a 58-year-old gentleman who is been found to have a Merkel cell carcinoma.  He originally presented with a mass in the left groin.  He underwent an excision of this which was thought to be a metastatic lymph node.  He had no identifiable primary tumor and was therefore staged as a TX N1 M0.  He completed a course of adjuvant radiation therapy directed to the left groin and adjacent lymphatics as follows    Treatment Summary     Dates of Therapy: 5/31/2018 - 7/5/2018  Treatment Site: Left Groin  Dose: 50 Gy in 25 fractions of 2 Gy each  Technique: Mixed 10X photons and 6MeV electrons    Treatment Course and Tolerance: He tolerated his radiation treatments very well.  Aside from some mild fatigue and grade 1 erythema which was limited to this treatment site, he had no other major symptoms.    The initial follow up visit will be in 1 month.    Regino Souza knows to call if any problems or concerns develop in the meantime.     Electronically signed by: Vance Rodgers MD                    Cc: Matthew Sanchez MD

## 2018-08-13 ENCOUNTER — OFFICE VISIT (OUTPATIENT)
Dept: RADIATION ONCOLOGY | Facility: HOSPITAL | Age: 58
End: 2018-08-13

## 2018-08-13 ENCOUNTER — HOSPITAL ENCOUNTER (OUTPATIENT)
Dept: RADIATION ONCOLOGY | Facility: HOSPITAL | Age: 58
Setting detail: RADIATION/ONCOLOGY SERIES
Discharge: HOME | End: 2018-08-13

## 2018-08-13 VITALS
HEART RATE: 96 BPM | WEIGHT: 256.5 LBS | RESPIRATION RATE: 18 BRPM | SYSTOLIC BLOOD PRESSURE: 148 MMHG | DIASTOLIC BLOOD PRESSURE: 69 MMHG | OXYGEN SATURATION: 98 % | BODY MASS INDEX: 35.79 KG/M2 | TEMPERATURE: 97.7 F

## 2018-08-13 DIAGNOSIS — C4A.9 MERKEL CELL CANCER (HCC): ICD-10-CM

## 2018-08-13 PROCEDURE — G0463 HOSPITAL OUTPT CLINIC VISIT: HCPCS | Performed by: RADIOLOGY

## 2018-08-13 NOTE — PROGRESS NOTES
FOLLOW UP NOTE    PATIENT:                                                      Regino Souza  MEDICAL RECORD #:                        [id number]  :                                                          1960  COMPLETION DATE:    2018  DIAGNOSIS:     Cancer Staging  Merkel cell cancer (CMS/HCC)  Staging form: Merkel Cell Carcinoma, AJCC 8th Edition  - Clinical: Stage Unknown (cTX, cN1, cM0) - Signed by Nori Norton APRN on 2018    BRIEF HISTORY:  Mr. 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  He states he is doing very well today and has recovered from his treatments.  He describes only minimal skin erythema, but otherwise has had no difficulty with pain or ulceration.  He has returned to work and at this time, he describes an excellent functional status and no other constitutional symptoms.    MEDICATIONS: Medication reconciliation for the patient was reviewed and confirmed in the electronic medical record.    Review of Systems   Constitutional: Positive for fatigue.   HENT:   Positive for hearing loss.    Musculoskeletal: Positive for back pain (chronic back pain ).   All other systems reviewed and are negative.      KPS 90%    Physical Exam   Constitutional: He is oriented to person, place, and time. He appears well-developed and well-nourished. No distress.   HENT:   Head: Normocephalic and atraumatic.   Mouth/Throat: Oropharynx is clear and moist.   Eyes: Pupils are equal, round, and reactive to light. Conjunctivae and EOM are normal.   Neck: Normal range of motion. Neck supple.   Cardiovascular: Normal rate and regular rhythm.  Exam reveals no friction rub.    No murmur heard.  Pulmonary/Chest: Effort normal and breath sounds normal. He has no wheezes.   Abdominal: Soft. Bowel sounds are normal.  He exhibits no distension and no mass. There is no tenderness.   Genitourinary:   Genitourinary Comments: He is no palpable lymphadenopathy in the bilateral groins.   Musculoskeletal: Normal range of motion. He exhibits no edema.   Lymphadenopathy:     He has no cervical adenopathy.   Neurological: He is alert and oriented to person, place, and time.   Skin: Skin is warm and dry.   He is post treatment related skin discoloration in the left groin but otherwise no residual erythema.   Psychiatric: He has a normal mood and affect. His behavior is normal. Judgment and thought content normal.   Nursing note and vitals reviewed.      VITAL SIGNS:   Vitals:    08/13/18 0958   BP: 148/69   Pulse: 96   Resp: 18   Temp: 97.7 °F (36.5 °C)   TempSrc: Temporal Artery    SpO2: 98%   Weight: 116 kg (256 lb 8 oz)   PainSc: 0-No pain       The following portions of the patient's history were reviewed and updated as appropriate: allergies, current medications, past family history, past medical history, past social history, past surgical history and problem list.       Regino was seen today for other.    Diagnoses and all orders for this visit:    Merkel cell cancer (CMS/[facility])       IMPRESSION:  Mr. Souza is a 58-year-old gentleman with a metastatic Merkel cell carcinoma to the left groin post excision as well as adjuvant external beam radiation therapy to [address].  He completed radiation treatments 1 month ago and appears to be doing very well today with minimal residual effects.  I have counseled him regarding the standard follow-up is recommended to him that he be seen at least every 3 months I one of the members of the treatment team with additional imaging to be coordinated.  He currently has a CT scan scheduled for September 10, therefore I scheduled him to return to my clinic on that day so that we can go over his scan as well as results.    RECOMMENDATIONS: Return to clinic on September 10, 2018 with CT scan of the abdomen  and pelvis.    Return in about 4 weeks (around 9/10/2018) for Imaging - See orders, Office Visit.    Vance Rodgers MD    Errors in dictation may reflect use of voice recognition software and not all errors in transcription may have been detected prior to signing.

## 2018-09-10 ENCOUNTER — HOSPITAL ENCOUNTER (OUTPATIENT)
Dept: CT IMAGING | Facility: HOSPITAL | Age: 58
Discharge: HOME | End: 2018-09-10
Admitting: NURSE PRACTITIONER

## 2018-09-10 ENCOUNTER — HOSPITAL ENCOUNTER (OUTPATIENT)
Dept: RADIATION ONCOLOGY | Facility: HOSPITAL | Age: 58
Setting detail: RADIATION/ONCOLOGY SERIES
Discharge: HOME | End: 2018-09-10

## 2018-09-10 ENCOUNTER — OFFICE VISIT (OUTPATIENT)
Dept: RADIATION ONCOLOGY | Facility: HOSPITAL | Age: 58
End: 2018-09-10

## 2018-09-10 VITALS
HEART RATE: 92 BPM | SYSTOLIC BLOOD PRESSURE: 133 MMHG | WEIGHT: 254 LBS | DIASTOLIC BLOOD PRESSURE: 65 MMHG | TEMPERATURE: 97 F | OXYGEN SATURATION: 98 % | RESPIRATION RATE: 18 BRPM | BODY MASS INDEX: 35.44 KG/M2

## 2018-09-10 DIAGNOSIS — C4A.9 MERKEL CELL CANCER (HCC): ICD-10-CM

## 2018-09-10 LAB
ALBUMIN SERPL-MCNC: 4.6 G/DL (ref 3.2–4.8)
ALBUMIN/GLOB SERPL: 2.3 G/DL (ref 1.5–2.5)
ALP SERPL-CCNC: 60 U/L (ref 25–100)
ALT SERPL W P-5'-P-CCNC: 21 U/L (ref 7–40)
ANION GAP SERPL CALCULATED.3IONS-SCNC: 8 MMOL/L (ref 3–11)
AST SERPL-CCNC: 17 U/L (ref 0–33)
BASOPHILS # BLD AUTO: 0.03 10*3/MM3 (ref 0–0.2)
BASOPHILS NFR BLD AUTO: 0.4 % (ref 0–1)
BILIRUB SERPL-MCNC: 0.7 MG/DL (ref 0.3–1.2)
BUN BLD-MCNC: 15 MG/DL (ref 9–23)
BUN/CREAT SERPL: 15.8 (ref 7–25)
CALCIUM SPEC-SCNC: 9.1 MG/DL (ref 8.7–10.4)
CHLORIDE SERPL-SCNC: 104 MMOL/L (ref 99–109)
CO2 SERPL-SCNC: 31 MMOL/L (ref 20–31)
CREAT BLD-MCNC: 0.95 MG/DL (ref 0.6–1.3)
DEPRECATED RDW RBC AUTO: 44.9 FL (ref 37–54)
EOSINOPHIL # BLD AUTO: 0.14 10*3/MM3 (ref 0–0.3)
EOSINOPHIL NFR BLD AUTO: 2 % (ref 0–3)
ERYTHROCYTE [DISTWIDTH] IN BLOOD BY AUTOMATED COUNT: 14.5 % (ref 11.3–14.5)
GFR SERPL CREATININE-BSD FRML MDRD: 81 ML/MIN/1.73
GLOBULIN UR ELPH-MCNC: 2 GM/DL
GLUCOSE BLD-MCNC: 117 MG/DL (ref 70–100)
HCT VFR BLD AUTO: 36.4 % (ref 38.9–50.9)
HGB BLD-MCNC: 12.5 G/DL (ref 13.1–17.5)
IMM GRANULOCYTES # BLD: 0.04 10*3/MM3 (ref 0–0.03)
IMM GRANULOCYTES NFR BLD: 0.6 % (ref 0–0.6)
LYMPHOCYTES # BLD AUTO: 0.93 10*3/MM3 (ref 0.6–4.8)
LYMPHOCYTES NFR BLD AUTO: 13.1 % (ref 24–44)
MCH RBC QN AUTO: 29.7 PG (ref 27–31)
MCHC RBC AUTO-ENTMCNC: 34.3 G/DL (ref 32–36)
MCV RBC AUTO: 86.5 FL (ref 80–99)
MONOCYTES # BLD AUTO: 0.59 10*3/MM3 (ref 0–1)
MONOCYTES NFR BLD AUTO: 8.3 % (ref 0–12)
NEUTROPHILS # BLD AUTO: 5.43 10*3/MM3 (ref 1.5–8.3)
NEUTROPHILS NFR BLD AUTO: 76.2 % (ref 41–71)
PLATELET # BLD AUTO: 134 10*3/MM3 (ref 150–450)
PMV BLD AUTO: 10.9 FL (ref 6–12)
POTASSIUM BLD-SCNC: 4.3 MMOL/L (ref 3.5–5.5)
PROT SERPL-MCNC: 6.6 G/DL (ref 5.7–8.2)
RBC # BLD AUTO: 4.21 10*6/MM3 (ref 4.2–5.76)
SODIUM BLD-SCNC: 143 MMOL/L (ref 132–146)
WBC NRBC COR # BLD: 7.12 10*3/MM3 (ref 3.5–10.8)

## 2018-09-10 PROCEDURE — 25010000002 IOPAMIDOL 61 % SOLUTION: Performed by: INTERNAL MEDICINE

## 2018-09-10 PROCEDURE — 85025 COMPLETE CBC W/AUTO DIFF WBC: CPT | Performed by: NURSE PRACTITIONER

## 2018-09-10 PROCEDURE — 71260 CT THORAX DX C+: CPT

## 2018-09-10 PROCEDURE — 80053 COMPREHEN METABOLIC PANEL: CPT | Performed by: NURSE PRACTITIONER

## 2018-09-10 PROCEDURE — G0463 HOSPITAL OUTPT CLINIC VISIT: HCPCS | Performed by: RADIOLOGY

## 2018-09-10 PROCEDURE — 74177 CT ABD & PELVIS W/CONTRAST: CPT

## 2018-09-10 PROCEDURE — 82565 ASSAY OF CREATININE: CPT

## 2018-09-10 RX ORDER — CHLORTHALIDONE 25 MG/1
TABLET ORAL
COMMUNITY
Start: 2018-08-14

## 2018-09-10 RX ADMIN — IOPAMIDOL 95 ML: 612 INJECTION, SOLUTION INTRAVENOUS at 10:04

## 2018-09-10 NOTE — PROGRESS NOTES
FOLLOW UP NOTE    PATIENT:                                                      Regino Souza  MEDICAL RECORD #:                        [id number]  :                                                          1960  COMPLETION DATE:    2018  DIAGNOSIS:     Cancer Staging  Merkel cell cancer (CMS/HCC)  Staging form: Merkel Cell Carcinoma, AJCC 8th Edition  - Clinical: Stage Unknown (cTX, cN1, cM0) - Signed by Nori Norton APRN on 2018    BRIEF HISTORY:  Mr. Souza returns to clinic today for a scheduled follow-up visit.  He is a 58-year-old gentleman who was diagnosed earlier this year with a metastatic Merkel cell carcinoma to the left inguinal lymph nodes.  He underwent an excision confirming the diagnosis.  Primary was not identified, and he underwent 4 cycles of cisplatin and etoposide chemotherapy followed by 50 gray of involved field radiation therapy under my direction.  He completed treatment during the first week of 2018.  Since then, he has returned to work and then back to his usual state of health.  He denies any difficulty with swelling in his left lower extremity, pain, fullness, or other complaints.  He underwent CT scans earlier today.    MEDICATIONS: Medication reconciliation for the patient was reviewed and confirmed in the electronic medical record.    Review of Systems   Constitutional: Positive for fatigue.   HENT:   Positive for tinnitus (intermittent).    Cardiovascular: Positive for leg swelling (reports right ankle).   Musculoskeletal: Positive for back pain (chronic).   All other systems reviewed and are negative.      KPS 90%    Physical Exam   Constitutional: He is oriented to person, place, and time. He appears well-developed and well-nourished. No distress.   HENT:   Head: Normocephalic and atraumatic.   Mouth/Throat: Oropharynx is clear and moist.   Eyes: Pupils are equal, round, and reactive to light. Conjunctivae and EOM are normal.   Neck: Normal range  of motion. Neck supple.   Cardiovascular: Normal rate and regular rhythm.  Exam reveals no friction rub.    No murmur heard.  Pulmonary/Chest: Effort normal and breath sounds normal. He has no wheezes.   Abdominal: Soft. Bowel sounds are normal. He exhibits no distension and no mass. There is no tenderness.   Genitourinary:   Genitourinary Comments: No palpable lymphadenopathy in the bilateral groins.  He has minimal skin discoloration in the left groin.  He has a 5 mm area of erythema consistent with a infected hair follicle in the mid upper groin.   Musculoskeletal: Normal range of motion. He exhibits no edema.   Lymphadenopathy:     He has no cervical adenopathy.   Neurological: He is alert and oriented to person, place, and time.   Skin: Skin is warm and dry.   Psychiatric: He has a normal mood and affect. His behavior is normal. Judgment and thought content normal.   Nursing note and vitals reviewed.      VITAL SIGNS:   Vitals:    09/10/18 1400   BP: 133/65   Pulse: 92   Resp: 18   Temp: 97 °F (36.1 °C)   TempSrc: Temporal Artery    SpO2: 98%   Weight: 115 kg (254 lb)   PainSc: 0-No pain       The following portions of the patient's history were reviewed and updated as appropriate: allergies, current medications, past family history, past medical history, past social history, past surgical history and problem list.         Regino was seen today for merkel cell cancer.    Diagnoses and all orders for this visit:    Merkel cell cancer (CMS/HCC)  -     CT Chest With Contrast; Future  -     CT Abdomen Pelvis With Contrast; Future    IMPRESSION:  Mr. Souza is a 58-year-old gentleman with a [id number] Merkel cell carcinoma involving the left groin.  He is 2 months out from completion of all adjuvant therapy and appears to be doing very well clinically with no evidence of recurrent disease within the left groin and minimal to no treatment-related toxicities.  His scans today are stable and show no evidence of new or  recurrent disease.  He has a Celiac axis lymph node that will need further attention with follow-up, but otherwise no concerning findings.  Considering he had lymph node positive disease, I explained to him that moving forward, the key areas that we will need to focus on with regards to his follow-up will be intrapelvic or abdominal lymph nodes, the risk for distant disease, and ongoing skin exams.  He acknowledged his understanding.  I scheduled him for repeat scans in 3 months time, and I will see him once those are completed.    RECOMMENDATIONS:      Return in about 3 months (around 12/10/2018) for Office Visit, Imaging - See orders.    Vance Rodgers MD    Errors in dictation may reflect use of voice recognition software and not all errors in transcription may have been detected prior to signing.

## 2018-09-11 ENCOUNTER — OFFICE VISIT (OUTPATIENT)
Dept: ONCOLOGY | Facility: CLINIC | Age: 58
End: 2018-09-11

## 2018-09-11 VITALS
WEIGHT: 253.9 LBS | DIASTOLIC BLOOD PRESSURE: 78 MMHG | TEMPERATURE: 97 F | SYSTOLIC BLOOD PRESSURE: 137 MMHG | OXYGEN SATURATION: 99 % | HEART RATE: 89 BPM | HEIGHT: 71 IN | BODY MASS INDEX: 35.55 KG/M2 | RESPIRATION RATE: 18 BRPM

## 2018-09-11 DIAGNOSIS — C4A.9 MERKEL CELL CANCER (HCC): Primary | ICD-10-CM

## 2018-09-11 LAB — CREAT BLDA-MCNC: 0.9 MG/DL (ref 0.6–1.3)

## 2018-09-11 PROCEDURE — 99213 OFFICE O/P EST LOW 20 MIN: CPT | Performed by: NURSE PRACTITIONER

## 2018-09-11 NOTE — PROGRESS NOTES
DATE OF VISIT: 9/11/2018    REASON FOR VISIT: Followup for Merkel cell carcinoma     HISTORY OF PRESENT ILLNESS: The patient is a very pleasant 58 y.o. 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  Whole body PET scan done on January 17, 2018 did not show any evidence of metastatic disease.  The patient was started on adjuvant chemotherapy with cisplatin and etoposide February 13, 2018.  Completed 4 cycles on April 20, 2018 The patient is here today for scheduled follow-up visit.    SUBJECTIVE:     PAST MEDICAL HISTORY/SOCIAL HISTORY/FAMILY HISTORY: Reviewed by me and unchanged from my documentation done on 05/15/18.    Review of Systems   Constitutional: Negative for activity change, appetite change, fever and unexpected weight change.   HENT: Negative for mouth sores.    Eyes: Negative.    Respiratory: Negative for cough and shortness of breath.    Cardiovascular: Negative for chest pain and leg swelling.   Gastrointestinal: Negative for abdominal pain, blood in stool, constipation, diarrhea, nausea and vomiting.   Endocrine: Negative.    Genitourinary: Negative for difficulty urinating.   Musculoskeletal: Negative.    Skin: Negative.    Allergic/Immunologic: Negative.    Neurological: Negative.    Hematological: Negative.    Psychiatric/Behavioral: Negative.          Current Outpatient Prescriptions:   •  amLODIPine-benazepril (LOTREL) 10-40 MG per capsule, Take 1 capsule by mouth Daily., Disp: , Rfl:   •  aspirin 81 MG tablet, Take 81 mg by mouth Daily., Disp: , Rfl:   •  atorvastatin (LIPITOR) 40 MG tablet, Take 40 mg by mouth Daily., Disp: , Rfl:   •  Blood Glucose Monitoring Suppl (ONE TOUCH ULTRA 2) w/Device kit, , Disp: , Rfl:   •  chlorthalidone (HYGROTON) 25 MG tablet, , Disp: , Rfl:   •  cholecalciferol (VITAMIN D3) 1000 units tablet, Take 1,000 Units by mouth Daily., Disp: , Rfl:   •  clonazePAM (KlonoPIN) 1 MG tablet, Take 1 mg by mouth 2 (Two)  Times a Day As Needed for Anxiety or Seizures., Disp: , Rfl:   •  Dulaglutide (TRULICITY) 1.5 MG/0.5ML solution pen-injector, Inject 1 dose under the skin 1 (One) Time Per Week., Disp: , Rfl:   •  HYDROcodone-acetaminophen (NORCO)  MG per tablet, Take 1 tablet by mouth Every 6 (Six) Hours As Needed for Moderate Pain ., Disp: , Rfl:   •  Insulin Glargine (TOUJEO SOLOSTAR SC), Inject 12 Units under the skin As Needed., Disp: , Rfl:   •  metFORMIN (GLUCOPHAGE) 1000 MG tablet, Take 1,000 mg by mouth 2 (Two) Times a Day With Meals., Disp: , Rfl:   •  Multiple Vitamins-Minerals (MULTIVITAL PO), Take 1 tablet by mouth Daily., Disp: , Rfl:   •  NOVOLOG FLEXPEN 100 UNIT/ML solution pen-injector sc pen, , Disp: , Rfl:   •  ondansetron (ZOFRAN) 8 MG tablet, Take 1 tablet by mouth 3 (Three) Times a Day As Needed for Nausea or Vomiting., Disp: 30 tablet, Rfl: 5  •  ONE TOUCH ULTRA TEST test strip, , Disp: , Rfl:   •  ONETOUCH DELICA LANCETS 33G misc, , Disp: , Rfl:   No current facility-administered medications for this visit.     PHYSICAL EXAMINATION:   There were no vitals taken for this visit.   ECOG Performance Status: 0 - Asymptomatic  General Appearance:  alert, cooperative, no apparent distress, appears stated age and normal weight   Neurologic/Psychiatric: A&O x 3, gait steady, appropriate affect, strength 5/5 in all muscle groups   HEENT:  Normocephalic, without obvious abnormality, mucous membranes moist   Neck: Supple, symmetrical, trachea midline, no adenopathy;  No thyromegaly, masses, or tenderness   Lungs:   Clear to auscultation bilaterally; respirations regular, even, and unlabored bilaterally   Heart:  Regular rate and rhythm, no murmurs appreciated   Abdomen:   Soft, non-tender and non-distended   Lymph nodes: No cervical, supraclavicular, inguinal or axillary adenopathy noted   Extremities: Normal, atraumatic; no clubbing, cyanosis, or edema    Skin: No rashes, ulcers, or suspicious lesions noted      Hospital Outpatient Visit on 09/10/2018   Component Date Value Ref Range Status   • Glucose 09/10/2018 117* 70 - 100 mg/dL Final   • BUN 09/10/2018 15  9 - 23 mg/dL Final   • Creatinine 09/10/2018 0.95  0.60 - 1.30 mg/dL Final   • Sodium 09/10/2018 143  132 - 146 mmol/L Final   • Potassium 09/10/2018 4.3  3.5 - 5.5 mmol/L Final   • Chloride 09/10/2018 104  99 - 109 mmol/L Final   • CO2 09/10/2018 31.0  20.0 - 31.0 mmol/L Final   • Calcium 09/10/2018 9.1  8.7 - 10.4 mg/dL Final   • Total Protein 09/10/2018 6.6  5.7 - 8.2 g/dL Final   • Albumin 09/10/2018 4.60  3.20 - 4.80 g/dL Final   • ALT (SGPT) 09/10/2018 21  7 - 40 U/L Final   • AST (SGOT) 09/10/2018 17  0 - 33 U/L Final   • Alkaline Phosphatase 09/10/2018 60  25 - 100 U/L Final   • Total Bilirubin 09/10/2018 0.7  0.3 - 1.2 mg/dL Final   • eGFR Non African Amer 09/10/2018 81  >60 mL/min/1.73 Final   • Globulin 09/10/2018 2.0  gm/dL Final   • A/G Ratio 09/10/2018 2.3  1.5 - 2.5 g/dL Final   • BUN/Creatinine Ratio 09/10/2018 15.8  7.0 - 25.0 Final   • Anion Gap 09/10/2018 8.0  3.0 - 11.0 mmol/L Final   • WBC 09/10/2018 7.12  3.50 - 10.80 10*3/mm3 Final   • RBC 09/10/2018 4.21  4.20 - 5.76 10*6/mm3 Final   • Hemoglobin 09/10/2018 12.5* 13.1 - 17.5 g/dL Final   • Hematocrit 09/10/2018 36.4* 38.9 - 50.9 % Final   • MCV 09/10/2018 86.5  80.0 - 99.0 fL Final   • MCH 09/10/2018 29.7  27.0 - 31.0 pg Final   • MCHC 09/10/2018 34.3  32.0 - 36.0 g/dL Final   • RDW 09/10/2018 14.5  11.3 - 14.5 % Final   • RDW-SD 09/10/2018 44.9  37.0 - 54.0 fl Final   • MPV 09/10/2018 10.9  6.0 - 12.0 fL Final   • Platelets 09/10/2018 134* 150 - 450 10*3/mm3 Final   • Neutrophil % 09/10/2018 76.2* 41.0 - 71.0 % Final   • Lymphocyte % 09/10/2018 13.1* 24.0 - 44.0 % Final   • Monocyte % 09/10/2018 8.3  0.0 - 12.0 % Final   • Eosinophil % 09/10/2018 2.0  0.0 - 3.0 % Final   • Basophil % 09/10/2018 0.4  0.0 - 1.0 % Final   • Immature Grans % 09/10/2018 0.6  0.0 - 0.6 % Final   • Neutrophils,  Absolute 09/10/2018 5.43  1.50 - 8.30 10*3/mm3 Final   • Lymphocytes, Absolute 09/10/2018 0.93  0.60 - 4.80 10*3/mm3 Final   • Monocytes, Absolute 09/10/2018 0.59  0.00 - 1.00 10*3/mm3 Final   • Eosinophils, Absolute 09/10/2018 0.14  0.00 - 0.30 10*3/mm3 Final   • Basophils, Absolute 09/10/2018 0.03  0.00 - 0.20 10*3/mm3 Final   • Immature Grans, Absolute 09/10/2018 0.04* 0.00 - 0.03 10*3/mm3 Final   • Creatinine 09/10/2018 0.90  0.60 - 1.30 mg/dL Final    Serial Number: [id number]:  [id number]        Ct Chest With Contrast    Result Date: 9/10/2018  Narrative: EXAMINATION: CT CHEST W CONTRAST-, CT ABDOMEN AND PELVIS W CONTRAST-  INDICATION: C4A.9-Merkel cell carcinoma, unspecified; restaging Merkel cell status postradiation treatment.  TECHNIQUE: Multiple axial CT imaging was obtained of the chest, abdomen and pelvis following the administration of intravenous contrast  The radiation dose reduction device was turned on for each scan per the ALARA (As Low as Reasonably Achievable) protocol.  COMPARISON: 05/10/2018.  FINDINGS: CHEST: The thyroid is heterogeneous in appearance and mildly enlarged with multiple small nodules. There is no mediastinal mass or adenopathy. The cardiac chambers are within normal limits. No pericardial effusion. No bulky hilar or axillary lymphadenopathy. Degenerative change is seen within the spine. The lung parenchyma reveals small calcified nodule identified within the left upper lobe. No parenchymal consolidation, pulmonary mass or other nodular present. Degenerative change is seen within the spine.  ABDOMEN: Liver is homogeneous in appearance. The spleen is unremarkable. Kidneys and adrenal glands are within normal limits. Mild enlargement of the adrenal gland suggesting possible hyperplasia. The appearance of the adrenal glands are stable and unchanged when compared to the prior study. Adjacent to the adrenal glands there is a small lymph node in the celiac axis measuring 1 cm in size  which is stable and unchanged when compared to the prior study. There is no retroperitoneal adenopathy identified. No new findings in the interval. Pancreas is homogeneous. No abdominal or retroperitoneal lymphadenopathy. No free fluid or free air. No abnormal mass or fluid collection is identified. No stones in the gallbladder. The abdominal portion of the gastrointestinal tract is within normal limits. Diverticulosis with no evidence of diverticulitis. Small periumbilical hernia containing mesenteric fat only. Vascular calcification is seen within the abdominal aorta and iliac vessels.  PELVIS: Pelvic organs are unremarkable. The pelvic portion of the gastrointestinal tract is within normal limits. No free fluid or free air. There is no pelvic adenopathy. Surgical clip is seen in the left inguinal region from prior lymph node dissection. No abnormal mass or fluid collection is identified. No free fluid or free air.  Bony structures again reveal ankylosing spondylitis throughout the spine as well as fusion of the sacroiliac joints bilaterally.      Impression: Stable examination with no evidence of metastatic or recurrent disease. There is a tiny 1 cm nodule identified in the celiac axis which is stable and unchanged when compared to the prior study. There is no evidence of progression of disease. No signs of occurrence in the left inguinal region.  D:  09/10/2018 E:  09/10/2018  This report was finalized on 9/10/2018 3:52 PM by Dr. Marina Leal MD.      Ct Abdomen Pelvis With Contrast    Result Date: 9/10/2018  Narrative: EXAMINATION: CT CHEST W CONTRAST-, CT ABDOMEN AND PELVIS W CONTRAST-  INDICATION: C4A.9-Merkel cell carcinoma, unspecified; restaging Merkel cell status postradiation treatment.  TECHNIQUE: Multiple axial CT imaging was obtained of the chest, abdomen and pelvis following the administration of intravenous contrast  The radiation dose reduction device was turned on for each scan per the ALARA  (As Low as Reasonably Achievable) protocol.  COMPARISON: 05/10/2018.  FINDINGS: CHEST: The thyroid is heterogeneous in appearance and mildly enlarged with multiple small nodules. There is no mediastinal mass or adenopathy. The cardiac chambers are within normal limits. No pericardial effusion. No bulky hilar or axillary lymphadenopathy. Degenerative change is seen within the spine. The lung parenchyma reveals small calcified nodule identified within the left upper lobe. No parenchymal consolidation, pulmonary mass or other nodular present. Degenerative change is seen within the spine.  ABDOMEN: Liver is homogeneous in appearance. The spleen is unremarkable. Kidneys and adrenal glands are within normal limits. Mild enlargement of the adrenal gland suggesting possible hyperplasia. The appearance of the adrenal glands are stable and unchanged when compared to the prior study. Adjacent to the adrenal glands there is a small lymph node in the celiac axis measuring 1 cm in size which is stable and unchanged when compared to the prior study. There is no retroperitoneal adenopathy identified. No new findings in the interval. Pancreas is homogeneous. No abdominal or retroperitoneal lymphadenopathy. No free fluid or free air. No abnormal mass or fluid collection is identified. No stones in the gallbladder. The abdominal portion of the gastrointestinal tract is within normal limits. Diverticulosis with no evidence of diverticulitis. Small periumbilical hernia containing mesenteric fat only. Vascular calcification is seen within the abdominal aorta and iliac vessels.  PELVIS: Pelvic organs are unremarkable. The pelvic portion of the gastrointestinal tract is within normal limits. No free fluid or free air. There is no pelvic adenopathy. Surgical clip is seen in the left inguinal region from prior lymph node dissection. No abnormal mass or fluid collection is identified. No free fluid or free air.  Bony structures again reveal  ankylosing spondylitis throughout the spine as well as fusion of the sacroiliac joints bilaterally.      Impression: Stable examination with no evidence of metastatic or recurrent disease. There is a tiny 1 cm nodule identified in the celiac axis which is stable and unchanged when compared to the prior study. There is no evidence of progression of disease. No signs of occurrence in the left inguinal region.  D:  09/10/2018 E:  09/10/2018  This report was finalized on 9/10/2018 3:52 PM by Dr. Marina Leal MD.        ASSESSMENT: The patient is a very pleasant 58 y.o. male  with Merkel cell carcinoma     PROBLEM LIST:  1. Merkel cell carcinoma [id number]  A. Presented with left groin mass.   B. Status post left groin excision of 5 cm mass.   C. Final pathology revealed Merkel cell carcinoma.   D. PET scan for clinical staging done 1/17/2018 failed to show evidence of metastatic disease.   E. Started adjuvant chemotherapy with cisplatin and etoposide February 13, 2018, status post 4 cycles, completed April 20, 2018.   2. History of basal cell carcinoma of the skin.   3. Type 2 diabetes.   4. Hypertension.  5.  Chemotherapy-induced mouth sores  6.  Chemotherapy used nausea     PLAN:  1.  I did go over the CAT scan results with the patient, reviewed the films myself, I reassured the patient is not evidence of residual or recurrent disease. Stable 1 cm nodule in the celiac axis, we will follow closely with repeat CT scan in 3 months.  Scans have been ordered by Dr. Rodgers.    2.  The patient will follow-up in 3 months.  3.  I will continue to monitor patient blood work including blood counts kidney function and liver function. Current labs are unremarkable.  Should be ordered prior to return.  4.  Future treatment options would include immunotherapy.  5.  Will continue Lotrel and hydrochlorothiazide for hypertension  6.  Will continue metformin as well as insulin for type 2 diabetes    Yokasta Mata,  APRN    9/11/2018

## 2018-09-18 ENCOUNTER — TRANSCRIBE ORDERS (OUTPATIENT)
Dept: ADMINISTRATIVE | Facility: HOSPITAL | Age: 58
End: 2018-09-18

## 2018-09-18 ENCOUNTER — HOSPITAL ENCOUNTER (OUTPATIENT)
Dept: GENERAL RADIOLOGY | Facility: HOSPITAL | Age: 58
Discharge: HOME | End: 2018-09-18
Attending: INTERNAL MEDICINE | Admitting: INTERNAL MEDICINE

## 2018-09-18 DIAGNOSIS — M79.606 PAIN OF LOWER EXTREMITY, UNSPECIFIED LATERALITY: ICD-10-CM

## 2018-09-18 DIAGNOSIS — M25.552 LEFT HIP PAIN: Primary | ICD-10-CM

## 2018-09-18 PROCEDURE — 73502 X-RAY EXAM HIP UNI 2-3 VIEWS: CPT

## 2018-12-10 ENCOUNTER — HOSPITAL ENCOUNTER (OUTPATIENT)
Dept: RADIATION ONCOLOGY | Facility: HOSPITAL | Age: 58
Setting detail: RADIATION/ONCOLOGY SERIES
Discharge: HOME | End: 2018-12-10

## 2018-12-10 ENCOUNTER — OFFICE VISIT (OUTPATIENT)
Dept: RADIATION ONCOLOGY | Facility: HOSPITAL | Age: 58
End: 2018-12-10

## 2018-12-10 ENCOUNTER — HOSPITAL ENCOUNTER (OUTPATIENT)
Dept: CT IMAGING | Facility: HOSPITAL | Age: 58
Discharge: HOME | End: 2018-12-10
Attending: RADIOLOGY | Admitting: RADIOLOGY

## 2018-12-10 ENCOUNTER — LAB (OUTPATIENT)
Dept: LAB | Facility: HOSPITAL | Age: 58
End: 2018-12-10

## 2018-12-10 VITALS
TEMPERATURE: 97.4 F | RESPIRATION RATE: 18 BRPM | BODY MASS INDEX: 35.68 KG/M2 | OXYGEN SATURATION: 98 % | SYSTOLIC BLOOD PRESSURE: 129 MMHG | WEIGHT: 255.8 LBS | DIASTOLIC BLOOD PRESSURE: 71 MMHG | HEART RATE: 98 BPM

## 2018-12-10 DIAGNOSIS — C4A.9 MERKEL CELL CANCER (HCC): ICD-10-CM

## 2018-12-10 LAB
ALBUMIN SERPL-MCNC: 4.52 G/DL (ref 3.2–4.8)
ALBUMIN/GLOB SERPL: 2.2 G/DL (ref 1.5–2.5)
ALP SERPL-CCNC: 69 U/L (ref 25–100)
ALT SERPL W P-5'-P-CCNC: 21 U/L (ref 7–40)
ANION GAP SERPL CALCULATED.3IONS-SCNC: 9 MMOL/L (ref 3–11)
AST SERPL-CCNC: 14 U/L (ref 0–33)
BASOPHILS # BLD AUTO: 0.04 10*3/MM3 (ref 0–0.2)
BASOPHILS NFR BLD AUTO: 0.5 % (ref 0–1)
BILIRUB SERPL-MCNC: 0.7 MG/DL (ref 0.3–1.2)
BUN BLD-MCNC: 18 MG/DL (ref 9–23)
BUN/CREAT SERPL: 14.5 (ref 7–25)
CALCIUM SPEC-SCNC: 9.2 MG/DL (ref 8.7–10.4)
CHLORIDE SERPL-SCNC: 101 MMOL/L (ref 99–109)
CO2 SERPL-SCNC: 28 MMOL/L (ref 20–31)
CREAT BLD-MCNC: 1.24 MG/DL (ref 0.6–1.3)
DEPRECATED RDW RBC AUTO: 43.5 FL (ref 37–54)
EOSINOPHIL # BLD AUTO: 0.14 10*3/MM3 (ref 0–0.3)
EOSINOPHIL NFR BLD AUTO: 1.8 % (ref 0–3)
ERYTHROCYTE [DISTWIDTH] IN BLOOD BY AUTOMATED COUNT: 13.7 % (ref 11.3–14.5)
GFR SERPL CREATININE-BSD FRML MDRD: 60 ML/MIN/1.73
GLOBULIN UR ELPH-MCNC: 2.1 GM/DL
GLUCOSE BLD-MCNC: 101 MG/DL (ref 70–100)
HCT VFR BLD AUTO: 39.8 % (ref 38.9–50.9)
HGB BLD-MCNC: 13.9 G/DL (ref 13.1–17.5)
IMM GRANULOCYTES # BLD: 0.04 10*3/MM3 (ref 0–0.03)
IMM GRANULOCYTES NFR BLD: 0.5 % (ref 0–0.6)
LYMPHOCYTES # BLD AUTO: 0.85 10*3/MM3 (ref 0.6–4.8)
LYMPHOCYTES NFR BLD AUTO: 11 % (ref 24–44)
MCH RBC QN AUTO: 30.4 PG (ref 27–31)
MCHC RBC AUTO-ENTMCNC: 34.9 G/DL (ref 32–36)
MCV RBC AUTO: 87.1 FL (ref 80–99)
MONOCYTES # BLD AUTO: 0.66 10*3/MM3 (ref 0–1)
MONOCYTES NFR BLD AUTO: 8.5 % (ref 0–12)
NEUTROPHILS # BLD AUTO: 6.03 10*3/MM3 (ref 1.5–8.3)
NEUTROPHILS NFR BLD AUTO: 77.7 % (ref 41–71)
PLATELET # BLD AUTO: 149 10*3/MM3 (ref 150–450)
PMV BLD AUTO: 10.8 FL (ref 6–12)
POTASSIUM BLD-SCNC: 3.9 MMOL/L (ref 3.5–5.5)
PROT SERPL-MCNC: 6.6 G/DL (ref 5.7–8.2)
RBC # BLD AUTO: 4.57 10*6/MM3 (ref 4.2–5.76)
SODIUM BLD-SCNC: 138 MMOL/L (ref 132–146)
WBC NRBC COR # BLD: 7.76 10*3/MM3 (ref 3.5–10.8)

## 2018-12-10 PROCEDURE — 71260 CT THORAX DX C+: CPT

## 2018-12-10 PROCEDURE — G0463 HOSPITAL OUTPT CLINIC VISIT: HCPCS | Performed by: RADIOLOGY

## 2018-12-10 PROCEDURE — 82565 ASSAY OF CREATININE: CPT

## 2018-12-10 PROCEDURE — 25010000002 IOPAMIDOL 61 % SOLUTION: Performed by: RADIOLOGY

## 2018-12-10 PROCEDURE — 85025 COMPLETE CBC W/AUTO DIFF WBC: CPT

## 2018-12-10 PROCEDURE — 74177 CT ABD & PELVIS W/CONTRAST: CPT

## 2018-12-10 PROCEDURE — 36415 COLL VENOUS BLD VENIPUNCTURE: CPT

## 2018-12-10 PROCEDURE — 80053 COMPREHEN METABOLIC PANEL: CPT

## 2018-12-10 RX ADMIN — IOPAMIDOL 95 ML: 612 INJECTION, SOLUTION INTRAVENOUS at 14:05

## 2018-12-11 LAB — CREAT BLDA-MCNC: 1.2 MG/DL (ref 0.6–1.3)

## 2018-12-14 ENCOUNTER — OFFICE VISIT (OUTPATIENT)
Dept: ONCOLOGY | Facility: CLINIC | Age: 58
End: 2018-12-14

## 2018-12-14 VITALS
RESPIRATION RATE: 16 BRPM | OXYGEN SATURATION: 97 % | DIASTOLIC BLOOD PRESSURE: 61 MMHG | HEART RATE: 85 BPM | HEIGHT: 71 IN | TEMPERATURE: 97.4 F | SYSTOLIC BLOOD PRESSURE: 129 MMHG | WEIGHT: 259 LBS | BODY MASS INDEX: 36.26 KG/M2

## 2018-12-14 DIAGNOSIS — C4A.9 MERKEL CELL CANCER (HCC): Primary | ICD-10-CM

## 2018-12-14 PROCEDURE — 99214 OFFICE O/P EST MOD 30 MIN: CPT | Performed by: INTERNAL MEDICINE

## 2018-12-14 NOTE — PROGRESS NOTES
DATE OF VISIT: 12/14/2018    REASON FOR VISIT: Followup for Merkel cell carcinoma     HISTORY OF PRESENT ILLNESS: The patient is a very pleasant 58 y.o. 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  Whole body PET scan done on January 17, 2018 did not show any evidence of metastatic disease.  The patient was started on adjuvant chemotherapy with cisplatin and etoposide February 13, 2018.  Completed 4 cycles on April 20, 2018 The patient is here today for scheduled follow-up visit.    SUBJECTIVE: Patient is here today with his sister.  All in all he has been doing fairly well.  He denied any fever or chills no night sweats.    PAST MEDICAL HISTORY/SOCIAL HISTORY/FAMILY HISTORY: Unchanged from my prior documentation done on February 6, 2018    Review of Systems   Constitutional: Positive for fatigue. Negative for activity change, appetite change, chills, fever and unexpected weight change.   HENT: Negative for hearing loss, mouth sores, nosebleeds, sore throat and trouble swallowing.    Eyes: Negative for visual disturbance.   Respiratory: Negative for cough, chest tightness, shortness of breath and wheezing.    Cardiovascular: Negative for chest pain, palpitations and leg swelling.   Gastrointestinal: Negative for abdominal distention, abdominal pain, blood in stool, constipation, diarrhea, nausea, rectal pain and vomiting.   Endocrine: Negative for cold intolerance and heat intolerance.   Genitourinary: Negative for difficulty urinating, dysuria, frequency and urgency.   Musculoskeletal: Negative for arthralgias, back pain, gait problem, joint swelling and myalgias.   Skin: Negative for rash.   Neurological: Negative for dizziness, tremors, syncope, weakness, light-headedness, numbness and headaches.   Hematological: Negative for adenopathy. Does not bruise/bleed easily.   Psychiatric/Behavioral: Negative for confusion, sleep disturbance and suicidal ideas. The  "patient is not nervous/anxious.          Current Outpatient Medications:   •  amLODIPine-benazepril (LOTREL) 10-40 MG per capsule, Take 1 capsule by mouth Daily., Disp: , Rfl:   •  aspirin 81 MG tablet, Take 81 mg by mouth Daily., Disp: , Rfl:   •  atorvastatin (LIPITOR) 40 MG tablet, Take 40 mg by mouth Daily., Disp: , Rfl:   •  Blood Glucose Monitoring Suppl (ONE TOUCH ULTRA 2) w/Device kit, , Disp: , Rfl:   •  chlorthalidone (HYGROTON) 25 MG tablet, , Disp: , Rfl:   •  cholecalciferol (VITAMIN D3) 1000 units tablet, Take 1,000 Units by mouth Daily., Disp: , Rfl:   •  clonazePAM (KlonoPIN) 1 MG tablet, Take 1 mg by mouth 2 (Two) Times a Day As Needed for Anxiety or Seizures., Disp: , Rfl:   •  Dulaglutide (TRULICITY) 1.5 MG/0.5ML solution pen-injector, Inject 1 dose under the skin 1 (One) Time Per Week., Disp: , Rfl:   •  HYDROcodone-acetaminophen (NORCO)  MG per tablet, Take 1 tablet by mouth Every 6 (Six) Hours As Needed for Moderate Pain ., Disp: , Rfl:   •  Insulin Glargine (TOUJEO SOLOSTAR SC), Inject 12 Units under the skin As Needed., Disp: , Rfl:   •  metFORMIN (GLUCOPHAGE) 1000 MG tablet, Take 1,000 mg by mouth 2 (Two) Times a Day With Meals., Disp: , Rfl:   •  Multiple Vitamins-Minerals (MULTIVITAL PO), Take 1 tablet by mouth Daily., Disp: , Rfl:   •  NOVOLOG FLEXPEN 100 UNIT/ML solution pen-injector sc pen, , Disp: , Rfl:   •  ondansetron (ZOFRAN) 8 MG tablet, Take 1 tablet by mouth 3 (Three) Times a Day As Needed for Nausea or Vomiting., Disp: 30 tablet, Rfl: 5  •  ONE TOUCH ULTRA TEST test strip, , Disp: , Rfl:   •  ONETOUCH DELICA LANCETS 33G misc, , Disp: , Rfl:     PHYSICAL EXAMINATION:   /61   Pulse 85   Temp 97.4 °F (36.3 °C) (Temporal)   Resp 16   Ht 180.3 cm (71\")   Wt 117 kg (259 lb)   SpO2 97%   BMI 36.12 kg/m²    ECOG Performance Status: 1 - Symptomatic but completely ambulatory  General Appearance:  alert, cooperative, no apparent distress and appears stated age "   Neurologic/Psychiatric: A&O x 3, gait steady, appropriate affect, strength 5/5 in all muscle groups   HEENT:  Normocephalic, without obvious abnormality, mucous membranes moist   Neck: Supple, symmetrical, trachea midline, no adenopathy;  No thyromegaly, masses, or tenderness   Lungs:   Clear to auscultation bilaterally; respirations regular, even, and unlabored bilaterally   Heart:  Regular rate and rhythm, no murmurs appreciated   Abdomen:   Soft, non-tender, non-distended and no organomegaly   Lymph nodes: No cervical, supraclavicular, inguinal or axillary adenopathy noted   Extremities: Normal, atraumatic; no clubbing, cyanosis, or edema    Skin: No rashes, ulcers, or suspicious lesions noted     Hospital Outpatient Visit on 12/10/2018   Component Date Value Ref Range Status   • Creatinine 12/10/2018 1.20  0.60 - 1.30 mg/dL Final    Serial Number: [id number]:  [id number]   Lab on 12/10/2018   Component Date Value Ref Range Status   • Glucose 12/10/2018 101* 70 - 100 mg/dL Final   • BUN 12/10/2018 18  9 - 23 mg/dL Final   • Creatinine 12/10/2018 1.24  0.60 - 1.30 mg/dL Final   • Sodium 12/10/2018 138  132 - 146 mmol/L Final   • Potassium 12/10/2018 3.9  3.5 - 5.5 mmol/L Final   • Chloride 12/10/2018 101  99 - 109 mmol/L Final   • CO2 12/10/2018 28.0  20.0 - 31.0 mmol/L Final   • Calcium 12/10/2018 9.2  8.7 - 10.4 mg/dL Final   • Total Protein 12/10/2018 6.6  5.7 - 8.2 g/dL Final   • Albumin 12/10/2018 4.52  3.20 - 4.80 g/dL Final   • ALT (SGPT) 12/10/2018 21  7 - 40 U/L Final   • AST (SGOT) 12/10/2018 14  0 - 33 U/L Final   • Alkaline Phosphatase 12/10/2018 69  25 - 100 U/L Final   • Total Bilirubin 12/10/2018 0.7  0.3 - 1.2 mg/dL Final   • eGFR Non African Amer 12/10/2018 60* >60 mL/min/1.73 Final   • Globulin 12/10/2018 2.1  gm/dL Final   • A/G Ratio 12/10/2018 2.2  1.5 - 2.5 g/dL Final   • BUN/Creatinine Ratio 12/10/2018 14.5  7.0 - 25.0 Final   • Anion Gap 12/10/2018 9.0  3.0 - 11.0 mmol/L Final   • WBC  12/10/2018 7.76  3.50 - 10.80 10*3/mm3 Final   • RBC 12/10/2018 4.57  4.20 - 5.76 10*6/mm3 Final   • Hemoglobin 12/10/2018 13.9  13.1 - 17.5 g/dL Final   • Hematocrit 12/10/2018 39.8  38.9 - 50.9 % Final   • MCV 12/10/2018 87.1  80.0 - 99.0 fL Final   • MCH 12/10/2018 30.4  27.0 - 31.0 pg Final   • MCHC 12/10/2018 34.9  32.0 - 36.0 g/dL Final   • RDW 12/10/2018 13.7  11.3 - 14.5 % Final   • RDW-SD 12/10/2018 43.5  37.0 - 54.0 fl Final   • MPV 12/10/2018 10.8  6.0 - 12.0 fL Final   • Platelets 12/10/2018 149* 150 - 450 10*3/mm3 Final   • Neutrophil % 12/10/2018 77.7* 41.0 - 71.0 % Final   • Lymphocyte % 12/10/2018 11.0* 24.0 - 44.0 % Final   • Monocyte % 12/10/2018 8.5  0.0 - 12.0 % Final   • Eosinophil % 12/10/2018 1.8  0.0 - 3.0 % Final   • Basophil % 12/10/2018 0.5  0.0 - 1.0 % Final   • Immature Grans % 12/10/2018 0.5  0.0 - 0.6 % Final   • Neutrophils, Absolute 12/10/2018 6.03  1.50 - 8.30 10*3/mm3 Final   • Lymphocytes, Absolute 12/10/2018 0.85  0.60 - 4.80 10*3/mm3 Final   • Monocytes, Absolute 12/10/2018 0.66  0.00 - 1.00 10*3/mm3 Final   • Eosinophils, Absolute 12/10/2018 0.14  0.00 - 0.30 10*3/mm3 Final   • Basophils, Absolute 12/10/2018 0.04  0.00 - 0.20 10*3/mm3 Final   • Immature Grans, Absolute 12/10/2018 0.04* 0.00 - 0.03 10*3/mm3 Final      Ct Chest With Contrast    Result Date: 12/10/2018  Narrative: EXAMINATION: CT CHEST W CONTRAST-, CT ABDOMEN PELVIS W CONTRAST- 12/10/2018  INDICATION: Merkel Cell Carcinoma post chemo and radiation; C4A.9-Merkel cell carcinoma, unspecified     TECHNIQUE: Spiral acquisition 5 mm post-IV contrast images through the chest and 5 mm post oral and IV contrast portal venous phase and delayed venous phase images through the abdomen and pelvis.  The radiation dose reduction device was turned on for each scan per the ALARA (As Low as Reasonably Achievable) protocol.  COMPARISON: 09/10/2018 chest, abdomen and pelvis CT scans.  FINDINGS: Previous exam reports indicate no  active disease in the chest, stable celiac axis 1 cm node.  CHEST CT SCAN WITH IV CONTRAST: Generalized enlargement of the thyroid and a couple of calcified and noncalcified right thyroid nodules are again noted unchanged. Very small right pericardial fluid is stable. There is no evidence of mediastinal hilar or axillary adenopathy. Note is again made of a slight degree of gynecomastia. Bony degenerative changes are again noted in the thoracic spine.  Lung window images show no evidence of pulmonary parenchymal disease. There is no pleural effusion.      Impression: Stable chest exam with no evidence of metastatic disease or other acute disease. Trace pericardial fluid, and stable goiter incidentally noted.  ABDOMEN AND PELVIS CT SCAN WITH ORAL AND IV CONTRAST: There is mild diffuse fatty liver change. No hepatic lesions are identified. Mild bilateral adrenal gland hyperplasia is unchanged. Granulomatous calcifications are again noted in the liver and the spleen, which remains upper limits of normal size to borderline enlarged at 16 cm in length.. No significant abnormalities are seen of the pancreas, gallbladder, or kidneys. I do not identify the previously described 1 cm celiac axis node on today's scan. There are scattered normal sized periaortic and right retrocrural nodes, unchanged. No significant upper abdominal inflammatory change or ascites is seen.  Regarding the lower abdomen and pelvis, large and small bowel loops are normal in caliber and largely normal in appearance. Incidental note is again made of a small pedunculated duodenal lipoma of the distal third portion of the duodenum, approximately 10 mm in diameter and unchanged. No intrapelvic mass, adenopathy or inflammatory change is seen. There is a small non strangulated fat-containing left periumbilical hernia which is stable. Clips are noted in the left inguinal region. No inguinal adenopathy, iliac or other pelvic adenopathy is seen. Bladder is  normally distended and normal in appearance. Bony structures appear intact.  IMPRESSION: 1. No evidence of acute intraabdominal or intrapelvic disease is seen. In particular, no evidence of lymphadenopathy/metastatic disease. 2. Stable adrenal gland hyperplasia, incidental 1 cm duodenal lipoma, borderline splenomegaly and small non strangulated fat-only containing left periumbilical hernia.  D:  12/10/2018 E:  12/10/2018  This report was finalized on 12/10/2018 9:49 PM by DR. Mushtaq Uriarte MD.      Ct Abdomen Pelvis With Contrast    Result Date: 12/10/2018  Narrative: EXAMINATION: CT CHEST W CONTRAST-, CT ABDOMEN PELVIS W CONTRAST- 12/10/2018  INDICATION: Merkel Cell Carcinoma post chemo and radiation; C4A.9-Merkel cell carcinoma, unspecified     TECHNIQUE: Spiral acquisition 5 mm post-IV contrast images through the chest and 5 mm post oral and IV contrast portal venous phase and delayed venous phase images through the abdomen and pelvis.  The radiation dose reduction device was turned on for each scan per the ALARA (As Low as Reasonably Achievable) protocol.  COMPARISON: 09/10/2018 chest, abdomen and pelvis CT scans.  FINDINGS: Previous exam reports indicate no active disease in the chest, stable celiac axis 1 cm node.  CHEST CT SCAN WITH IV CONTRAST: Generalized enlargement of the thyroid and a couple of calcified and noncalcified right thyroid nodules are again noted unchanged. Very small right pericardial fluid is stable. There is no evidence of mediastinal hilar or axillary adenopathy. Note is again made of a slight degree of gynecomastia. Bony degenerative changes are again noted in the thoracic spine.  Lung window images show no evidence of pulmonary parenchymal disease. There is no pleural effusion.      Impression: Stable chest exam with no evidence of metastatic disease or other acute disease. Trace pericardial fluid, and stable goiter incidentally noted.  ABDOMEN AND PELVIS CT SCAN WITH ORAL AND IV CONTRAST:  There is mild diffuse fatty liver change. No hepatic lesions are identified. Mild bilateral adrenal gland hyperplasia is unchanged. Granulomatous calcifications are again noted in the liver and the spleen, which remains upper limits of normal size to borderline enlarged at 16 cm in length.. No significant abnormalities are seen of the pancreas, gallbladder, or kidneys. I do not identify the previously described 1 cm celiac axis node on today's scan. There are scattered normal sized periaortic and right retrocrural nodes, unchanged. No significant upper abdominal inflammatory change or ascites is seen.  Regarding the lower abdomen and pelvis, large and small bowel loops are normal in caliber and largely normal in appearance. Incidental note is again made of a small pedunculated duodenal lipoma of the distal third portion of the duodenum, approximately 10 mm in diameter and unchanged. No intrapelvic mass, adenopathy or inflammatory change is seen. There is a small non strangulated fat-containing left periumbilical hernia which is stable. Clips are noted in the left inguinal region. No inguinal adenopathy, iliac or other pelvic adenopathy is seen. Bladder is normally distended and normal in appearance. Bony structures appear intact.  IMPRESSION: 1. No evidence of acute intraabdominal or intrapelvic disease is seen. In particular, no evidence of lymphadenopathy/metastatic disease. 2. Stable adrenal gland hyperplasia, incidental 1 cm duodenal lipoma, borderline splenomegaly and small non strangulated fat-only containing left periumbilical hernia.  D:  12/10/2018 E:  12/10/2018  This report was finalized on 12/10/2018 9:49 PM by DR. Mushtaq Uriarte MD.    (  No results found.    ASSESSMENT: The patient is a very pleasant 58 y.o. male  with Merkel cell carcinoma    PROBLEM LIST:  1. Merkel cell carcinoma [id number]  A. Presented with left groin mass.   B. Status post left groin excision of 5 cm mass.   C. Final pathology revealed  Merkel cell carcinoma.   D. PET scan for clinical staging done 1/17/2018 failed to show evidence of metastatic disease.   E. Started adjuvant chemotherapy with cisplatin and etoposide February 13, 2018, status post 4 cycles, completed April 20, 2018.   2. History of basal cell carcinoma of the skin.   3. Type 2 diabetes.   4. Hypertension.  5.  Chemotherapy-induced mouth sores  6.  Chemotherapy used nausea    PLAN:  1.  I did go over the CAT scan results with the patient and his sister, reviewed the films myself, I reassured the patient is not evidence of residual or recurrent disease.  There were minor abnormalities noted on the CAT scan which we will follow closely with repeat CT scan in 4 months.  2.  The patient will follow-up with me in 4 months.  If his next scan continued to be stable I will switch him to every 6 month visits at that point.  3.  I will continue to monitor patient blood work including blood counts kidney function and liver function.  Should be ordered prior to return.  4.  I will continue Zofran as needed for nausea.  5.  He completed adjuvant radiation treatment with Dr. Rodgers.   6.  Future treatment options would include immunotherapy.  7.  We'll continue Lotrel and hydrochlorothiazide for hypertension  8.  We'll continue metformin as well as insulin for type 2 diabetes  Anastasia Waller MD  12/14/2018

## 2019-03-19 ENCOUNTER — HOSPITAL ENCOUNTER (OUTPATIENT)
Dept: RADIATION ONCOLOGY | Facility: HOSPITAL | Age: 59
Setting detail: RADIATION/ONCOLOGY SERIES
Discharge: HOME | End: 2019-03-19

## 2019-03-20 ENCOUNTER — OFFICE VISIT (OUTPATIENT)
Dept: RADIATION ONCOLOGY | Facility: HOSPITAL | Age: 59
End: 2019-03-20

## 2019-03-20 VITALS
DIASTOLIC BLOOD PRESSURE: 77 MMHG | OXYGEN SATURATION: 98 % | SYSTOLIC BLOOD PRESSURE: 137 MMHG | RESPIRATION RATE: 18 BRPM | WEIGHT: 267.7 LBS | HEART RATE: 86 BPM | BODY MASS INDEX: 37.34 KG/M2 | TEMPERATURE: 97.8 F

## 2019-03-20 DIAGNOSIS — C4A.9 MERKEL CELL CANCER (HCC): ICD-10-CM

## 2019-03-20 PROCEDURE — G0463 HOSPITAL OUTPT CLINIC VISIT: HCPCS | Performed by: RADIOLOGY

## 2019-03-20 NOTE — PROGRESS NOTES
FOLLOW UP NOTE    PATIENT:                                                      Regino Souza  MEDICAL RECORD #:                        [id number]  :                                                          1960  COMPLETION DATE:    2018  DIAGNOSIS:     Cancer Staging  Merkel cell cancer (CMS/HCC)  Staging form: Merkel Cell Carcinoma, AJCC 8th Edition  - Clinical: Stage Unknown (cTX, cN1, cM0) - Signed by Nori Norton APRN on 2018    BRIEF HISTORY:  Mr. Souza returns to clinic today for a scheduled 3-month follow-up visit.  He is a 58-year-old gentleman who was diagnosed in 2018 with a clinical TX N1 M0 Merkel cell carcinoma involving the left inguinal lymph nodes.  He underwent a lymph node excision followed by 4 cycles of cisplatin and etoposide, and then he completed a course of 50 Gy of external beam radiation therapy to the left groin in 2018.  Since completing treatment he is done very well.  He had repeat scans 2-1/2 months ago that showed no evidence of recurrent disease.  He also denies any lower extremity swelling or issues with numbness or tingling.  He was seen by his dermatologist last month who did a complete physical exam and did not note any other skin lesions.    MEDICATIONS: Medication reconciliation for the patient was reviewed and confirmed in the electronic medical record.    Review of Systems   Cardiovascular: Positive for leg swelling (left ankle).   Musculoskeletal: Positive for back pain (chronic).   Psychiatric/Behavioral: The patient is nervous/anxious.        KPS 90%    Physical Exam   Constitutional: He is oriented to person, place, and time. He appears well-developed and well-nourished. No distress.   HENT:   Head: Normocephalic and atraumatic.   Mouth/Throat: Oropharynx is clear and moist.   Eyes: Conjunctivae and EOM are normal. Pupils are equal, round, and reactive to light.   Neck: Normal range of motion. Neck supple.   Cardiovascular:  Normal rate and regular rhythm. Exam reveals no friction rub.   No murmur heard.  Pulmonary/Chest: Effort normal and breath sounds normal. He has no wheezes.   Abdominal: Soft. Bowel sounds are normal. He exhibits no distension and no mass. There is no tenderness.   Genitourinary:   Genitourinary Comments: Postoperative changes within the left groin.  No palpable lymphadenopathy or nodularity.    No evidence of lower extremity edema   Musculoskeletal: Normal range of motion. He exhibits no edema.   Lymphadenopathy:     He has no cervical adenopathy.   Neurological: He is alert and oriented to person, place, and time.   Skin: Skin is warm and dry.   Psychiatric: He has a normal mood and affect. His behavior is normal. Judgment and thought content normal.   Nursing note and vitals reviewed.      VITAL SIGNS:   Vitals:    03/20/19 1358   BP: 137/77   Pulse: 86   Resp: 18   Temp: 97.8 °F (36.6 °C)   TempSrc: Temporal   SpO2: 98%   Weight: 121 kg (267 lb 11.2 oz)   PainSc: 0-No pain     IMAGING  I have personally reviewed the relevant imaging studies, as follows:  Ct Chest With Contrast    Result Date: 12/10/2018  Stable chest exam with no evidence of metastatic disease or other acute disease. Trace pericardial fluid, and stable goiter incidentally noted.  ABDOMEN AND PELVIS CT SCAN WITH ORAL AND IV CONTRAST: There is mild diffuse fatty liver change. No hepatic lesions are identified. Mild bilateral adrenal gland hyperplasia is unchanged. Granulomatous calcifications are again noted in the liver and the spleen, which remains upper limits of normal size to borderline enlarged at 16 cm in length.. No significant abnormalities are seen of the pancreas, gallbladder, or kidneys. I do not identify the previously described 1 cm celiac axis node on today's scan. There are scattered normal sized periaortic and right retrocrural nodes, unchanged. No significant upper abdominal inflammatory change or ascites is seen.  Regarding the  lower abdomen and pelvis, large and small bowel loops are normal in caliber and largely normal in appearance. Incidental note is again made of a small pedunculated duodenal lipoma of the distal third portion of the duodenum, approximately 10 mm in diameter and unchanged. No intrapelvic mass, adenopathy or inflammatory change is seen. There is a small non strangulated fat-containing left periumbilical hernia which is stable. Clips are noted in the left inguinal region. No inguinal adenopathy, iliac or other pelvic adenopathy is seen. Bladder is normally distended and normal in appearance. Bony structures appear intact.  IMPRESSION: 1. No evidence of acute intraabdominal or intrapelvic disease is seen. In particular, no evidence of lymphadenopathy/metastatic disease. 2. Stable adrenal gland hyperplasia, incidental 1 cm duodenal lipoma, borderline splenomegaly and small non strangulated fat-only containing left periumbilical hernia.  D:  12/10/2018 E:  12/10/2018  This report was finalized on 12/10/2018 9:49 PM by DR. Mushtaq Uriarte MD.      Ct Abdomen Pelvis With Contrast    Result Date: 12/10/2018  Stable chest exam with no evidence of metastatic disease or other acute disease. Trace pericardial fluid, and stable goiter incidentally noted.  ABDOMEN AND PELVIS CT SCAN WITH ORAL AND IV CONTRAST: There is mild diffuse fatty liver change. No hepatic lesions are identified. Mild bilateral adrenal gland hyperplasia is unchanged. Granulomatous calcifications are again noted in the liver and the spleen, which remains upper limits of normal size to borderline enlarged at 16 cm in length.. No significant abnormalities are seen of the pancreas, gallbladder, or kidneys. I do not identify the previously described 1 cm celiac axis node on today's scan. There are scattered normal sized periaortic and right retrocrural nodes, unchanged. No significant upper abdominal inflammatory change or ascites is seen.  Regarding the lower abdomen  and pelvis, large and small bowel loops are normal in caliber and largely normal in appearance. Incidental note is again made of a small pedunculated duodenal lipoma of the distal third portion of the duodenum, approximately 10 mm in diameter and unchanged. No intrapelvic mass, adenopathy or inflammatory change is seen. There is a small non strangulated fat-containing left periumbilical hernia which is stable. Clips are noted in the left inguinal region. No inguinal adenopathy, iliac or other pelvic adenopathy is seen. Bladder is normally distended and normal in appearance. Bony structures appear intact.  IMPRESSION: 1. No evidence of acute intraabdominal or intrapelvic disease is seen. In particular, no evidence of lymphadenopathy/metastatic disease. 2. Stable adrenal gland hyperplasia, incidental 1 cm duodenal lipoma, borderline splenomegaly and small non strangulated fat-only containing left periumbilical hernia.  D:  12/10/2018 E:  12/10/2018  This report was finalized on 12/10/2018 9:49 PM by DR. Mushtaq Uriarte MD.        The following portions of the patient's history were reviewed and updated as appropriate: allergies, current medications, past family history, past medical history, past social history, past surgical history and problem list.         Regino was seen today for merkel cell cancer.    Diagnoses and all orders for this visit:    Merkel cell cancer (CMS/HCC)    IMPRESSION:  Mr. Souza is a 58 year old gentleman with a lymph node positive merkel cell carcinoma in the left groin.  He is 9 months out from completion of therapy and continues to do well without any evidence of recurrent disease.  He is scheduled to be seen by Dr. Waller in 2 months with repeat scans.  I offered today to defer all follow-up visits to the medical oncology clinic, but her requested that I remain involved with his care.  I will have him return to my clinic in 6 months and will aim to alternate visits with   Christin.    RECOMMENDATIONS:      Return in about 6 months (around 9/20/2019) for Office Visit.    Vance Rodgers MD    Errors in dictation may reflect use of voice recognition software and not all errors in transcription may have been detected prior to signing.

## 2019-04-11 ENCOUNTER — HOSPITAL ENCOUNTER (OUTPATIENT)
Dept: CT IMAGING | Facility: HOSPITAL | Age: 59
Discharge: HOME | End: 2019-04-11
Admitting: INTERNAL MEDICINE

## 2019-04-11 ENCOUNTER — LAB (OUTPATIENT)
Dept: LAB | Facility: HOSPITAL | Age: 59
End: 2019-04-11

## 2019-04-11 DIAGNOSIS — C4A.9 MERKEL CELL CANCER (HCC): ICD-10-CM

## 2019-04-11 LAB
ALBUMIN SERPL-MCNC: 4.3 G/DL (ref 3.5–5.2)
ALBUMIN/GLOB SERPL: 1.5 G/DL
ALP SERPL-CCNC: 67 U/L (ref 39–117)
ALT SERPL W P-5'-P-CCNC: 24 U/L (ref 1–41)
ANION GAP SERPL CALCULATED.3IONS-SCNC: 12 MMOL/L
AST SERPL-CCNC: 17 U/L (ref 1–40)
BASOPHILS # BLD AUTO: 0.02 10*3/MM3 (ref 0–0.2)
BASOPHILS NFR BLD AUTO: 0.3 % (ref 0–1.5)
BILIRUB SERPL-MCNC: 0.7 MG/DL (ref 0.2–1.2)
BUN BLD-MCNC: 18 MG/DL (ref 6–20)
BUN/CREAT SERPL: 21.4 (ref 7–25)
CALCIUM SPEC-SCNC: 9 MG/DL (ref 8.6–10.5)
CHLORIDE SERPL-SCNC: 102 MMOL/L (ref 98–107)
CO2 SERPL-SCNC: 28 MMOL/L (ref 22–29)
CREAT BLD-MCNC: 0.84 MG/DL (ref 0.76–1.27)
DEPRECATED RDW RBC AUTO: 45.9 FL (ref 37–54)
EOSINOPHIL # BLD AUTO: 0.17 10*3/MM3 (ref 0–0.4)
EOSINOPHIL NFR BLD AUTO: 2.3 % (ref 0.3–6.2)
ERYTHROCYTE [DISTWIDTH] IN BLOOD BY AUTOMATED COUNT: 14.3 % (ref 12.3–15.4)
GFR SERPL CREATININE-BSD FRML MDRD: 94 ML/MIN/1.73
GLOBULIN UR ELPH-MCNC: 2.8 GM/DL
GLUCOSE BLD-MCNC: 135 MG/DL (ref 65–99)
HCT VFR BLD AUTO: 38.4 % (ref 37.5–51)
HGB BLD-MCNC: 13.2 G/DL (ref 13–17.7)
IMM GRANULOCYTES # BLD AUTO: 0.06 10*3/MM3 (ref 0–0.05)
IMM GRANULOCYTES NFR BLD AUTO: 0.8 % (ref 0–0.5)
LYMPHOCYTES # BLD AUTO: 0.83 10*3/MM3 (ref 0.7–3.1)
LYMPHOCYTES NFR BLD AUTO: 11.4 % (ref 19.6–45.3)
MCH RBC QN AUTO: 30.3 PG (ref 26.6–33)
MCHC RBC AUTO-ENTMCNC: 34.4 G/DL (ref 31.5–35.7)
MCV RBC AUTO: 88.1 FL (ref 79–97)
MONOCYTES # BLD AUTO: 0.62 10*3/MM3 (ref 0.1–0.9)
MONOCYTES NFR BLD AUTO: 8.6 % (ref 5–12)
NEUTROPHILS # BLD AUTO: 5.61 10*3/MM3 (ref 1.4–7)
NEUTROPHILS NFR BLD AUTO: 77.4 % (ref 42.7–76)
PLATELET # BLD AUTO: 134 10*3/MM3 (ref 140–450)
PMV BLD AUTO: 10.9 FL (ref 6–12)
POTASSIUM BLD-SCNC: 4 MMOL/L (ref 3.5–5.2)
PROT SERPL-MCNC: 7.1 G/DL (ref 6–8.5)
RBC # BLD AUTO: 4.36 10*6/MM3 (ref 4.14–5.8)
SODIUM BLD-SCNC: 142 MMOL/L (ref 136–145)
WBC NRBC COR # BLD: 7.25 10*3/MM3 (ref 3.4–10.8)

## 2019-04-11 PROCEDURE — 71260 CT THORAX DX C+: CPT

## 2019-04-11 PROCEDURE — 25010000002 IOPAMIDOL 61 % SOLUTION: Performed by: INTERNAL MEDICINE

## 2019-04-11 PROCEDURE — 85025 COMPLETE CBC W/AUTO DIFF WBC: CPT

## 2019-04-11 PROCEDURE — 74177 CT ABD & PELVIS W/CONTRAST: CPT

## 2019-04-11 PROCEDURE — 80053 COMPREHEN METABOLIC PANEL: CPT

## 2019-04-11 PROCEDURE — 36415 COLL VENOUS BLD VENIPUNCTURE: CPT

## 2019-04-11 PROCEDURE — 82565 ASSAY OF CREATININE: CPT

## 2019-04-11 RX ADMIN — IOPAMIDOL 99 ML: 612 INJECTION, SOLUTION INTRAVENOUS at 09:27

## 2019-04-12 ENCOUNTER — OFFICE VISIT (OUTPATIENT)
Dept: ONCOLOGY | Facility: CLINIC | Age: 59
End: 2019-04-12

## 2019-04-12 VITALS
BODY MASS INDEX: 37.52 KG/M2 | SYSTOLIC BLOOD PRESSURE: 133 MMHG | RESPIRATION RATE: 18 BRPM | HEART RATE: 89 BPM | HEIGHT: 71 IN | WEIGHT: 268 LBS | TEMPERATURE: 97.2 F | DIASTOLIC BLOOD PRESSURE: 76 MMHG

## 2019-04-12 DIAGNOSIS — C4A.9 MERKEL CELL CANCER (HCC): Primary | ICD-10-CM

## 2019-04-12 PROCEDURE — 99214 OFFICE O/P EST MOD 30 MIN: CPT | Performed by: INTERNAL MEDICINE

## 2019-04-12 NOTE — PROGRESS NOTES
DATE OF VISIT: 4/12/2019    REASON FOR VISIT: Followup for Merkel cell carcinoma     HISTORY OF PRESENT ILLNESS: The patient is a very pleasant 58 y.o. 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  Whole body PET scan done on January 17, 2018 did not show any evidence of metastatic disease.  The patient was started on adjuvant chemotherapy with cisplatin and etoposide February 13, 2018.  Completed 4 cycles on April 20, 2018 The patient is here today for scheduled follow-up visit.    SUBJECTIVE: Patient is here today with his sister.  He denies any weight loss.  No new pain.  He did not feel any new lymphadenopathy.    PAST MEDICAL HISTORY/SOCIAL HISTORY/FAMILY HISTORY: Unchanged from my prior documentation done on February 6, 2018    Review of Systems   Constitutional: Positive for fatigue. Negative for activity change, appetite change, chills, fever and unexpected weight change.   HENT: Negative for hearing loss, mouth sores, nosebleeds, sore throat and trouble swallowing.    Eyes: Negative for visual disturbance.   Respiratory: Negative for cough, chest tightness, shortness of breath and wheezing.    Cardiovascular: Negative for chest pain, palpitations and leg swelling.   Gastrointestinal: Negative for abdominal distention, abdominal pain, blood in stool, constipation, diarrhea, nausea, rectal pain and vomiting.   Endocrine: Negative for cold intolerance and heat intolerance.   Genitourinary: Negative for difficulty urinating, dysuria, frequency and urgency.   Musculoskeletal: Negative for arthralgias, back pain, gait problem, joint swelling and myalgias.   Skin: Negative for rash.   Neurological: Negative for dizziness, tremors, syncope, weakness, light-headedness, numbness and headaches.   Hematological: Negative for adenopathy. Does not bruise/bleed easily.   Psychiatric/Behavioral: Negative for confusion, sleep disturbance and suicidal ideas. The patient  "is not nervous/anxious.          Current Outpatient Medications:   •  amLODIPine-benazepril (LOTREL) 10-40 MG per capsule, Take 1 capsule by mouth Daily., Disp: , Rfl:   •  aspirin 81 MG tablet, Take 81 mg by mouth Daily., Disp: , Rfl:   •  atorvastatin (LIPITOR) 40 MG tablet, Take 40 mg by mouth Daily., Disp: , Rfl:   •  Blood Glucose Monitoring Suppl (ONE TOUCH ULTRA 2) w/Device kit, , Disp: , Rfl:   •  chlorthalidone (HYGROTON) 25 MG tablet, , Disp: , Rfl:   •  cholecalciferol (VITAMIN D3) 1000 units tablet, Take 1,000 Units by mouth Daily., Disp: , Rfl:   •  clonazePAM (KlonoPIN) 1 MG tablet, Take 1 mg by mouth 2 (Two) Times a Day As Needed for Anxiety or Seizures., Disp: , Rfl:   •  Dulaglutide (TRULICITY) 1.5 MG/0.5ML solution pen-injector, Inject 1 dose under the skin 1 (One) Time Per Week., Disp: , Rfl:   •  HYDROcodone-acetaminophen (NORCO)  MG per tablet, Take 1 tablet by mouth Every 6 (Six) Hours As Needed for Moderate Pain ., Disp: , Rfl:   •  Insulin Glargine (TOUJEO SOLOSTAR SC), Inject 12 Units under the skin As Needed., Disp: , Rfl:   •  metFORMIN (GLUCOPHAGE) 1000 MG tablet, Take 1,000 mg by mouth 2 (Two) Times a Day With Meals., Disp: , Rfl:   •  Multiple Vitamins-Minerals (MULTIVITAL PO), Take 1 tablet by mouth Daily., Disp: , Rfl:   •  NOVOLOG FLEXPEN 100 UNIT/ML solution pen-injector sc pen, , Disp: , Rfl:   •  ondansetron (ZOFRAN) 8 MG tablet, Take 1 tablet by mouth 3 (Three) Times a Day As Needed for Nausea or Vomiting., Disp: 30 tablet, Rfl: 5  •  ONE TOUCH ULTRA TEST test strip, , Disp: , Rfl:   •  ONETOUCH DELICA LANCETS 33G misc, , Disp: , Rfl:     PHYSICAL EXAMINATION:   /76   Pulse 89   Temp 97.2 °F (36.2 °C) (Temporal)   Resp 18   Ht 180.3 cm (71\")   Wt 122 kg (268 lb)   BMI 37.38 kg/m²    ECOG Performance Status: 1 - Symptomatic but completely ambulatory  General Appearance:  alert, cooperative, no apparent distress and appears stated age   Neurologic/Psychiatric: A&O " x 3, gait steady, appropriate affect, strength 5/5 in all muscle groups   HEENT:  Normocephalic, without obvious abnormality, mucous membranes moist   Neck: Supple, symmetrical, trachea midline, no adenopathy;  No thyromegaly, masses, or tenderness   Lungs:   Clear to auscultation bilaterally; respirations regular, even, and unlabored bilaterally   Heart:  Regular rate and rhythm, no murmurs appreciated   Abdomen:   Soft, non-tender, non-distended and no organomegaly   Lymph nodes: No cervical, supraclavicular, inguinal or axillary adenopathy noted   Extremities: Normal, atraumatic; no clubbing, cyanosis, or edema    Skin: No rashes, ulcers, or suspicious lesions noted     Lab on 04/11/2019   Component Date Value Ref Range Status   • Glucose 04/11/2019 135* 65 - 99 mg/dL Final   • BUN 04/11/2019 18  6 - 20 mg/dL Final   • Creatinine 04/11/2019 0.84  0.76 - 1.27 mg/dL Final   • Sodium 04/11/2019 142  136 - 145 mmol/L Final   • Potassium 04/11/2019 4.0  3.5 - 5.2 mmol/L Final   • Chloride 04/11/2019 102  98 - 107 mmol/L Final   • CO2 04/11/2019 28.0  22.0 - 29.0 mmol/L Final   • Calcium 04/11/2019 9.0  8.6 - 10.5 mg/dL Final   • Total Protein 04/11/2019 7.1  6.0 - 8.5 g/dL Final   • Albumin 04/11/2019 4.30  3.50 - 5.20 g/dL Final   • ALT (SGPT) 04/11/2019 24  1 - 41 U/L Final   • AST (SGOT) 04/11/2019 17  1 - 40 U/L Final   • Alkaline Phosphatase 04/11/2019 67  39 - 117 U/L Final   • Total Bilirubin 04/11/2019 0.7  0.2 - 1.2 mg/dL Final   • eGFR Non African Amer 04/11/2019 94  >60 mL/min/1.73 Final   • Globulin 04/11/2019 2.8  gm/dL Final   • A/G Ratio 04/11/2019 1.5  g/dL Final   • BUN/Creatinine Ratio 04/11/2019 21.4  7.0 - 25.0 Final   • Anion Gap 04/11/2019 12.0  mmol/L Final   • WBC 04/11/2019 7.25  3.40 - 10.80 10*3/mm3 Final   • RBC 04/11/2019 4.36  4.14 - 5.80 10*6/mm3 Final   • Hemoglobin 04/11/2019 13.2  13.0 - 17.7 g/dL Final   • Hematocrit 04/11/2019 38.4  37.5 - 51.0 % Final   • MCV 04/11/2019 88.1  79.0  - 97.0 fL Final   • MCH 04/11/2019 30.3  26.6 - 33.0 pg Final   • MCHC 04/11/2019 34.4  31.5 - 35.7 g/dL Final   • RDW 04/11/2019 14.3  12.3 - 15.4 % Final   • RDW-SD 04/11/2019 45.9  37.0 - 54.0 fl Final   • MPV 04/11/2019 10.9  6.0 - 12.0 fL Final   • Platelets 04/11/2019 134* 140 - 450 10*3/mm3 Final   • Neutrophil % 04/11/2019 77.4* 42.7 - 76.0 % Final   • Lymphocyte % 04/11/2019 11.4* 19.6 - 45.3 % Final   • Monocyte % 04/11/2019 8.6  5.0 - 12.0 % Final   • Eosinophil % 04/11/2019 2.3  0.3 - 6.2 % Final   • Basophil % 04/11/2019 0.3  0.0 - 1.5 % Final   • Immature Grans % 04/11/2019 0.8* 0.0 - 0.5 % Final   • Neutrophils, Absolute 04/11/2019 5.61  1.40 - 7.00 10*3/mm3 Final   • Lymphocytes, Absolute 04/11/2019 0.83  0.70 - 3.10 10*3/mm3 Final   • Monocytes, Absolute 04/11/2019 0.62  0.10 - 0.90 10*3/mm3 Final   • Eosinophils, Absolute 04/11/2019 0.17  0.00 - 0.40 10*3/mm3 Final   • Basophils, Absolute 04/11/2019 0.02  0.00 - 0.20 10*3/mm3 Final   • Immature Grans, Absolute 04/11/2019 0.06* 0.00 - 0.05 10*3/mm3 Final      Ct Chest With Contrast    Result Date: 4/11/2019  Narrative: EXAMINATION: CT ABDOMEN AND PELVIS WITH CONTRAST-, CT CHEST WITH CONTRAST-04/11/2019:  INDICATION: F/U scan; C4A.9-Merkel cell carcinoma, unspecified.  TECHNIQUE: 5 mm post-IV contrast images through the chest and 5 mm post-IV contrast portal venous phase and delayed venous phase images through the abdomen and pelvis.  The radiation dose reduction device was turned on for each scan per the ALARA (As Low as Reasonably Achievable) protocol.  COMPARISON: Chest, abdomen and pelvis CT scans of 12/10/2018.  FINDINGS: Note is again made of the patient's thyroid goiter, which appears stable. There is relatively small caliber of the distal SVC, and a small amount of collateralization of flow in the anterior neck, as on previous studies. There is no obvious mass or clot in the SVC and this appears to be a chronic finding. Trace pericardial  fluid is again seen anteriorly. No mediastinal, axillary, or hilar adenopathy is seen. The lungs appear clear bilaterally. Diffuse osteophytosis of the thoracic spine is consistent with the patient's known ankylosing spondylitis.      Impression: 1. Stable chest CT scan with no evidence of malignancy/metastasis. 2. Incidentally noted small caliber distal SVC, without evidence of any associated lesion, and diffuse osteophytosis of the thoracic spine.    ABDOMEN AND PELVIS CT SCAN WITH IV CONTRAST: There is relatively mild fatty liver change. No hepatic lesions are identified. Slightly hyperplastic appearance of the adrenal glands is unchanged. The spleen is not enlarged. The gallbladder, pancreas, and kidneys appear unremarkable. No upper abdominal free air, ascites, adenopathy, or acute inflammatory change is identified. Incidental note is again made of a very small fat-only containing left periumbilical hernia with no evidence of strangulation.  Regarding the lower abdomen and pelvis, the bowel loops are normal in caliber and normal in appearance. There is a normal appearing appendix, terminal ileum and cecum. The bladder is normally distended and normal in appearance. No pelvic or inguinal adenopathy is seen. Shotty normal sized inguinal lymph nodes appear unchanged. The bony structures appear intact, again with SI joint fusion and multilevel lumbar osteophyte formation consistent with the patient's history of ankylosing spondylosis.  IMPRESSION: No evidence of intra-abdominal or intrapelvic metastatic disease or other acute disease. Stable, mildly hyperplastic appearance of the adrenal glands and small fat-only containing left periumbilical hernia.  D:  04/11/2019 E:  04/11/2019    This report was finalized on 4/11/2019 10:25 PM by DR. Mushtaq Uriarte MD.      Ct Abdomen Pelvis With Contrast    Result Date: 4/11/2019  Narrative: EXAMINATION: CT ABDOMEN AND PELVIS WITH CONTRAST-, CT CHEST WITH CONTRAST-04/11/2019:   INDICATION: F/U scan; C4A.9-Merkel cell carcinoma, unspecified.  TECHNIQUE: 5 mm post-IV contrast images through the chest and 5 mm post-IV contrast portal venous phase and delayed venous phase images through the abdomen and pelvis.  The radiation dose reduction device was turned on for each scan per the ALARA (As Low as Reasonably Achievable) protocol.  COMPARISON: Chest, abdomen and pelvis CT scans of 12/10/2018.  FINDINGS: Note is again made of the patient's thyroid goiter, which appears stable. There is relatively small caliber of the distal SVC, and a small amount of collateralization of flow in the anterior neck, as on previous studies. There is no obvious mass or clot in the SVC and this appears to be a chronic finding. Trace pericardial fluid is again seen anteriorly. No mediastinal, axillary, or hilar adenopathy is seen. The lungs appear clear bilaterally. Diffuse osteophytosis of the thoracic spine is consistent with the patient's known ankylosing spondylitis.      Impression: 1. Stable chest CT scan with no evidence of malignancy/metastasis. 2. Incidentally noted small caliber distal SVC, without evidence of any associated lesion, and diffuse osteophytosis of the thoracic spine.    ABDOMEN AND PELVIS CT SCAN WITH IV CONTRAST: There is relatively mild fatty liver change. No hepatic lesions are identified. Slightly hyperplastic appearance of the adrenal glands is unchanged. The spleen is not enlarged. The gallbladder, pancreas, and kidneys appear unremarkable. No upper abdominal free air, ascites, adenopathy, or acute inflammatory change is identified. Incidental note is again made of a very small fat-only containing left periumbilical hernia with no evidence of strangulation.  Regarding the lower abdomen and pelvis, the bowel loops are normal in caliber and normal in appearance. There is a normal appearing appendix, terminal ileum and cecum. The bladder is normally distended and normal in appearance. No  pelvic or inguinal adenopathy is seen. Shotty normal sized inguinal lymph nodes appear unchanged. The bony structures appear intact, again with SI joint fusion and multilevel lumbar osteophyte formation consistent with the patient's history of ankylosing spondylosis.  IMPRESSION: No evidence of intra-abdominal or intrapelvic metastatic disease or other acute disease. Stable, mildly hyperplastic appearance of the adrenal glands and small fat-only containing left periumbilical hernia.  D:  04/11/2019 E:  04/11/2019    This report was finalized on 4/11/2019 10:25 PM by DR. Mushtaq Uriarte MD.    (  No results found.    ASSESSMENT: The patient is a very pleasant 58 y.o. male  with Merkel cell carcinoma    PROBLEM LIST:  1. Merkel cell carcinoma [id number]  A. Presented with left groin mass.   B. Status post left groin excision of 5 cm mass.   C. Final pathology revealed Merkel cell carcinoma.   D. PET scan for clinical staging done 1/17/2018 failed to show evidence of metastatic disease.   E. Started adjuvant chemotherapy with cisplatin and etoposide February 13, 2018, status post 4 cycles, completed April 20, 2018.   F.  Completed adjuvant radiation to the left groin.  2. History of basal cell carcinoma of the skin.   3. Type 2 diabetes.   4. Hypertension.      PLAN:  1.  I did go over the CAT scan results with the patient and his sister. I reassured the patient is not evidence of residual or recurrent disease.  There were minor abnormalities noted on the CAT scan which we will follow closely with repeat CT scan in 6 months.  2.  The patient will follow-up with us in 6 months.    3.  I will continue to monitor patient blood work including blood counts kidney function and liver function.  Should be ordered prior to return.  4.  I will continue Zofran as needed for nausea.  5.  Future treatment options would include immunotherapy.  6.  We'll continue Lotrel and hydrochlorothiazide for hypertension  7.  We'll continue metformin as  well as insulin for type 2 diabetes  Anastasia Waller MD  4/12/2019

## 2019-04-14 LAB — CREAT BLDA-MCNC: 0.9 MG/DL (ref 0.6–1.3)

## 2019-10-14 ENCOUNTER — HOSPITAL ENCOUNTER (OUTPATIENT)
Dept: CT IMAGING | Facility: HOSPITAL | Age: 59
Discharge: HOME | End: 2019-10-14
Admitting: INTERNAL MEDICINE

## 2019-10-14 DIAGNOSIS — C4A.9 MERKEL CELL CANCER (HCC): ICD-10-CM

## 2019-10-14 PROCEDURE — 74177 CT ABD & PELVIS W/CONTRAST: CPT

## 2019-10-14 PROCEDURE — 71260 CT THORAX DX C+: CPT

## 2019-10-14 PROCEDURE — 82565 ASSAY OF CREATININE: CPT

## 2019-10-14 PROCEDURE — 25010000002 IOPAMIDOL 61 % SOLUTION: Performed by: INTERNAL MEDICINE

## 2019-10-14 RX ADMIN — IOPAMIDOL 100 ML: 612 INJECTION, SOLUTION INTRAVENOUS at 12:21

## 2019-10-15 ENCOUNTER — LAB (OUTPATIENT)
Dept: LAB | Facility: HOSPITAL | Age: 59
End: 2019-10-15

## 2019-10-15 ENCOUNTER — OFFICE VISIT (OUTPATIENT)
Dept: ONCOLOGY | Facility: CLINIC | Age: 59
End: 2019-10-15

## 2019-10-15 VITALS
BODY MASS INDEX: 38.64 KG/M2 | SYSTOLIC BLOOD PRESSURE: 158 MMHG | RESPIRATION RATE: 12 BRPM | TEMPERATURE: 97.6 F | WEIGHT: 276 LBS | HEIGHT: 71 IN | HEART RATE: 87 BPM | DIASTOLIC BLOOD PRESSURE: 71 MMHG | OXYGEN SATURATION: 96 %

## 2019-10-15 DIAGNOSIS — C4A.9 MERKEL CELL CANCER (HCC): Primary | ICD-10-CM

## 2019-10-15 DIAGNOSIS — C4A.9 MERKEL CELL CANCER (HCC): ICD-10-CM

## 2019-10-15 LAB
ALBUMIN SERPL-MCNC: 4.4 G/DL (ref 3.5–5.2)
ALBUMIN/GLOB SERPL: 1.6 G/DL
ALP SERPL-CCNC: 71 U/L (ref 39–117)
ALT SERPL W P-5'-P-CCNC: 29 U/L (ref 1–41)
ANION GAP SERPL CALCULATED.3IONS-SCNC: 9 MMOL/L (ref 5–15)
AST SERPL-CCNC: 18 U/L (ref 1–40)
BILIRUB SERPL-MCNC: 0.9 MG/DL (ref 0.2–1.2)
BUN BLD-MCNC: 17 MG/DL (ref 6–20)
BUN/CREAT SERPL: 16.3 (ref 7–25)
CALCIUM SPEC-SCNC: 9.9 MG/DL (ref 8.6–10.5)
CHLORIDE SERPL-SCNC: 97 MMOL/L (ref 98–107)
CO2 SERPL-SCNC: 32 MMOL/L (ref 22–29)
CREAT BLD-MCNC: 1.04 MG/DL (ref 0.76–1.27)
ERYTHROCYTE [DISTWIDTH] IN BLOOD BY AUTOMATED COUNT: 14.1 % (ref 12.3–15.4)
GFR SERPL CREATININE-BSD FRML MDRD: 73 ML/MIN/1.73
GLOBULIN UR ELPH-MCNC: 2.8 GM/DL
GLUCOSE BLD-MCNC: 302 MG/DL (ref 65–99)
HCT VFR BLD AUTO: 40 % (ref 37.5–51)
HGB BLD-MCNC: 13.7 G/DL (ref 13–17.7)
LYMPHOCYTES # BLD AUTO: 1 10*3/MM3 (ref 0.7–3.1)
LYMPHOCYTES NFR BLD AUTO: 14 % (ref 19.6–45.3)
MCH RBC QN AUTO: 31.2 PG (ref 26.6–33)
MCHC RBC AUTO-ENTMCNC: 34.4 G/DL (ref 31.5–35.7)
MCV RBC AUTO: 90.7 FL (ref 79–97)
MONOCYTES # BLD AUTO: 0.6 10*3/MM3 (ref 0.1–0.9)
MONOCYTES NFR BLD AUTO: 8.1 % (ref 5–12)
NEUTROPHILS # BLD AUTO: 5.5 10*3/MM3 (ref 1.7–7)
NEUTROPHILS NFR BLD AUTO: 77.9 % (ref 42.7–76)
PLATELET # BLD AUTO: 141 10*3/MM3 (ref 140–450)
PMV BLD AUTO: 7.9 FL (ref 6–12)
POTASSIUM BLD-SCNC: 4.7 MMOL/L (ref 3.5–5.2)
PROT SERPL-MCNC: 7.2 G/DL (ref 6–8.5)
RBC # BLD AUTO: 4.4 10*6/MM3 (ref 4.14–5.8)
SODIUM BLD-SCNC: 138 MMOL/L (ref 136–145)
WBC NRBC COR # BLD: 7 10*3/MM3 (ref 3.4–10.8)

## 2019-10-15 PROCEDURE — 99214 OFFICE O/P EST MOD 30 MIN: CPT | Performed by: NURSE PRACTITIONER

## 2019-10-15 PROCEDURE — 80053 COMPREHEN METABOLIC PANEL: CPT

## 2019-10-15 PROCEDURE — 36415 COLL VENOUS BLD VENIPUNCTURE: CPT

## 2019-10-15 PROCEDURE — 85025 COMPLETE CBC W/AUTO DIFF WBC: CPT

## 2019-10-15 NOTE — PROGRESS NOTES
DATE OF VISIT: 10/15/2019    REASON FOR VISIT: Followup for Merkel cell carcinoma     HISTORY OF PRESENT ILLNESS: The patient is a very pleasant 59 y.o. 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  Whole body PET scan done on January 17, 2018 did not show any evidence of metastatic disease.  The patient was started on adjuvant chemotherapy with cisplatin and etoposide February 13, 2018.  Completed 4 cycles on April 20, 2018 The patient is here today for scheduled follow-up visit.    SUBJECTIVE: The patient is here today with his sister. He had been doing fairly well since his last visit. He has had no changes in health history, and continues to see Dr. Sanchez for primary care every 3 months as well as dermatology every 6 months for skin exams. He continues to have some mild lymphedema in his left lower leg. It resolves with elevation and use of compression stockings. He is eating and drinking well. He denies pain or bowel changes.     PAST MEDICAL HISTORY/SOCIAL HISTORY/FAMILY HISTORY: Unchanged from my prior documentation done on February 6, 2018.     Review of Systems   Constitutional: Positive for fatigue. Negative for activity change, appetite change, chills, fever and unexpected weight change.   HENT: Negative for hearing loss, mouth sores, nosebleeds, sore throat and trouble swallowing.    Eyes: Negative for visual disturbance.   Respiratory: Negative for cough, chest tightness, shortness of breath and wheezing.    Cardiovascular: Positive for leg swelling. Negative for chest pain and palpitations.   Gastrointestinal: Negative for abdominal distention, abdominal pain, blood in stool, constipation, diarrhea, nausea, rectal pain and vomiting.   Endocrine: Negative for cold intolerance and heat intolerance.   Genitourinary: Negative for difficulty urinating, dysuria, frequency and urgency.   Musculoskeletal: Negative for arthralgias, back pain, gait problem,  joint swelling and myalgias.   Skin: Negative for rash.   Neurological: Negative for dizziness, tremors, syncope, weakness, light-headedness, numbness and headaches.   Hematological: Negative for adenopathy. Does not bruise/bleed easily.   Psychiatric/Behavioral: Negative for confusion, sleep disturbance and suicidal ideas. The patient is not nervous/anxious.          Current Outpatient Medications:   •  amLODIPine-benazepril (LOTREL) 10-40 MG per capsule, Take 1 capsule by mouth Daily., Disp: , Rfl:   •  aspirin 81 MG tablet, Take 81 mg by mouth Daily., Disp: , Rfl:   •  atorvastatin (LIPITOR) 40 MG tablet, Take 40 mg by mouth Daily., Disp: , Rfl:   •  Blood Glucose Monitoring Suppl (ONE TOUCH ULTRA 2) w/Device kit, , Disp: , Rfl:   •  chlorthalidone (HYGROTON) 25 MG tablet, , Disp: , Rfl:   •  cholecalciferol (VITAMIN D3) 1000 units tablet, Take 1,000 Units by mouth Daily., Disp: , Rfl:   •  clonazePAM (KlonoPIN) 1 MG tablet, Take 1 mg by mouth 2 (Two) Times a Day As Needed for Anxiety or Seizures., Disp: , Rfl:   •  Dulaglutide (TRULICITY) 1.5 MG/0.5ML solution pen-injector, Inject 1 dose under the skin 1 (One) Time Per Week., Disp: , Rfl:   •  HYDROcodone-acetaminophen (NORCO)  MG per tablet, Take 1 tablet by mouth Every 6 (Six) Hours As Needed for Moderate Pain ., Disp: , Rfl:   •  Insulin Glargine (TOUJEO SOLOSTAR SC), Inject 12 Units under the skin As Needed., Disp: , Rfl:   •  metFORMIN (GLUCOPHAGE) 1000 MG tablet, Take 1,000 mg by mouth 2 (Two) Times a Day With Meals., Disp: , Rfl:   •  Multiple Vitamins-Minerals (MULTIVITAL PO), Take 1 tablet by mouth Daily., Disp: , Rfl:   •  NOVOLOG FLEXPEN 100 UNIT/ML solution pen-injector sc pen, , Disp: , Rfl:   •  ondansetron (ZOFRAN) 8 MG tablet, Take 1 tablet by mouth 3 (Three) Times a Day As Needed for Nausea or Vomiting., Disp: 30 tablet, Rfl: 5  •  ONE TOUCH ULTRA TEST test strip, , Disp: , Rfl:   •  ONETOUCH DELICA LANCETS 33G misc, , Disp: , Rfl:   No  current facility-administered medications for this visit.     PHYSICAL EXAMINATION:   There were no vitals taken for this visit.   ECOG Performance Status: 1 - Symptomatic but completely ambulatory  General Appearance:  alert, cooperative, no apparent distress and appears stated age   Neurologic/Psychiatric: A&O x 3, gait steady, appropriate affect, strength 5/5 in all muscle groups   HEENT:  Normocephalic, without obvious abnormality, mucous membranes moist   Neck: Supple, symmetrical, trachea midline, no adenopathy;  No thyromegaly, masses, or tenderness   Lungs:   Clear to auscultation bilaterally; respirations regular, even, and unlabored bilaterally   Heart:  Regular rate and rhythm, no murmurs appreciated   Abdomen:   Soft, non-tender, non-distended and no organomegaly   Lymph nodes: No cervical, supraclavicular, inguinal or axillary adenopathy noted   Extremities: Normal, atraumatic; no clubbing, cyanosis, or edema    Skin: No rashes, ulcers, or suspicious lesions noted     No visits with results within 2 Week(s) from this visit.   Latest known visit with results is:   Hospital Outpatient Visit on 04/11/2019   Component Date Value Ref Range Status   • Creatinine 04/11/2019 0.90  0.60 - 1.30 mg/dL Final    Serial Number: [id number]:  [id number]      Ct Chest With Contrast    Result Date: 10/14/2019  Narrative: EXAMINATION: CT CHEST W CONTRAST-, CT ABDOMEN PELVIS W CONTRAST- 10/14/2019  INDICATION: f/u scan; C4A.9-Merkel cell carcinoma, unspecified  TECHNIQUE: CT chest, abdomen and pelvis with intravenous contrast  The radiation dose reduction device was turned on for each scan per the ALARA (As Low as Reasonably Achievable) protocol.  COMPARISON: CT dated 04/11/2019  FINDINGS:  CHEST: Thyroid is heterogeneous in attenuation with thyroid nodule and associated coarsened calcifications right thyroid lobe unchanged from prior. No bulky mediastinal adenopathy. Central airways are patent. Esophagus in normal course  and caliber. Atherosclerotic nonaneurysmal thoracic aorta with patent great vessel origins. Central pulmonary arteries grossly patent. Cardiac size within normal limits and without pericardial effusion. Extended lung windows without focal opacification or consolidation. No dominant nodule or mass lesion. Degenerative changes of the spine without aggressive osseous lesion. No soft tissue body wall findings of concern specifically no bulky axillary adenopathy.  ABDOMEN AND PELVIS: Liver demonstrates mild diffuse low-attenuation of hepatic steatosis without focal liver lesion. Gallbladder unremarkable. No biliary dilatation. Pancreas and spleen unremarkable. Adrenals demonstrate stable right adrenal adenoma. No bulky mediastinal adenopathy. Atherosclerotic nonaneurysmal abdominal aorta. Kidneys without hydronephrosis or hydroureter demonstrating grossly symmetric nephrogram and subsequent excretory phase imaging. GI tract evaluation without focal thickening or disproportionate dilatation of bowel to suggest mechanical obstructive process. No free fluid or intra-abdominal free air. Appendix visualized in the right lower quadrant unremarkable. Degenerative changes of the spine without aggressive osseous lesion. Post surgical changes left inguinal region from prior resection without recurrent soft tissue mass/abnormal enhancement or adenopathy associated.      Impression: Stable appearance of the chest, abdomen and pelvis with postsurgical changes left inguinal region similar to prior. No new nodular enhancement or bulky adenopathy at this site or elsewhere to suggest recurrence.  D:  10/14/2019 E:  10/14/2019  This report was finalized on 10/14/2019 6:57 PM by Dr. David Desouza.      Ct Abdomen Pelvis With Contrast    Result Date: 10/14/2019  Narrative: EXAMINATION: CT CHEST W CONTRAST-, CT ABDOMEN PELVIS W CONTRAST- 10/14/2019  INDICATION: f/u scan; C4A.9-Merkel cell carcinoma, unspecified  TECHNIQUE: CT chest, abdomen  and pelvis with intravenous contrast  The radiation dose reduction device was turned on for each scan per the ALARA (As Low as Reasonably Achievable) protocol.  COMPARISON: CT dated 04/11/2019  FINDINGS:  CHEST: Thyroid is heterogeneous in attenuation with thyroid nodule and associated coarsened calcifications right thyroid lobe unchanged from prior. No bulky mediastinal adenopathy. Central airways are patent. Esophagus in normal course and caliber. Atherosclerotic nonaneurysmal thoracic aorta with patent great vessel origins. Central pulmonary arteries grossly patent. Cardiac size within normal limits and without pericardial effusion. Extended lung windows without focal opacification or consolidation. No dominant nodule or mass lesion. Degenerative changes of the spine without aggressive osseous lesion. No soft tissue body wall findings of concern specifically no bulky axillary adenopathy.  ABDOMEN AND PELVIS: Liver demonstrates mild diffuse low-attenuation of hepatic steatosis without focal liver lesion. Gallbladder unremarkable. No biliary dilatation. Pancreas and spleen unremarkable. Adrenals demonstrate stable right adrenal adenoma. No bulky mediastinal adenopathy. Atherosclerotic nonaneurysmal abdominal aorta. Kidneys without hydronephrosis or hydroureter demonstrating grossly symmetric nephrogram and subsequent excretory phase imaging. GI tract evaluation without focal thickening or disproportionate dilatation of bowel to suggest mechanical obstructive process. No free fluid or intra-abdominal free air. Appendix visualized in the right lower quadrant unremarkable. Degenerative changes of the spine without aggressive osseous lesion. Post surgical changes left inguinal region from prior resection without recurrent soft tissue mass/abnormal enhancement or adenopathy associated.      Impression: Stable appearance of the chest, abdomen and pelvis with postsurgical changes left inguinal region similar to prior. No  new nodular enhancement or bulky adenopathy at this site or elsewhere to suggest recurrence.  D:  10/14/2019 E:  10/14/2019  This report was finalized on 10/14/2019 6:57 PM by Dr. David Desouza.    (  No results found.    ASSESSMENT: The patient is a very pleasant 59 y.o. male  with Merkel cell carcinoma    PROBLEM LIST:  1. Merkel cell carcinoma [id number]  A. Presented with left groin mass.   B. Status post left groin excision of 5 cm mass.   C. Final pathology revealed Merkel cell carcinoma.   D. PET scan for clinical staging done 1/17/2018 failed to show evidence of metastatic disease.   E. Started adjuvant chemotherapy with cisplatin and etoposide February 13, 2018, status post 4 cycles, completed April 20, 2018.   F.  Completed adjuvant radiation to the left groin.  2. History of basal cell carcinoma of the skin.   3. Type 2 diabetes.   4. Hypertension.      PLAN:  1.  I reviewed the CAT scan results with the patient. I reviewed the images myself. I told the patient that he has stable findings with no evidence of new or progressive disease.    2.  The patient will follow-up with us in 6 months with repeat CAT scans of chest, abdomen, and pelvis ordered for prior to return.    3.  We will check labs today including CBC and CMP. We will follow up on the results and notify him of findings. We will repeat labs on return including CBC and CMP.  4.  I recommended the patient continue use of compression stockings as well as elevation of his extremities to help with mild lymphedema.   5.  Future treatment options may include immunotherapy if he has evidence of relapsed disease in the future.   6.  We'll continue Lotrel and hydrochlorothiazide for hypertension.  7.  We'll continue metformin as well as insulin for type 2 diabetes.  8. He will continue follow up with dermatology for skin exams.     Nori Norton, APRN  10/15/2019

## 2019-10-16 LAB — CREAT BLDA-MCNC: 1 MG/DL (ref 0.6–1.3)

## 2020-03-25 ENCOUNTER — TELEPHONE (OUTPATIENT)
Dept: ONCOLOGY | Facility: CLINIC | Age: 60
End: 2020-03-25

## 2020-03-25 NOTE — TELEPHONE ENCOUNTER
Pt sister, Eusebia, called to cancel appt on 4/17/20 due to COVID - 19.  Pt will call back and reschedule at a later date.  [facility] canceled appt

## 2020-04-13 ENCOUNTER — HOSPITAL ENCOUNTER (OUTPATIENT)
Dept: CT IMAGING | Facility: HOSPITAL | Age: 60
End: 2020-04-13

## 2020-04-30 ENCOUNTER — TELEPHONE (OUTPATIENT)
Dept: ONCOLOGY | Facility: CLINIC | Age: 60
End: 2020-04-30

## 2020-05-01 ENCOUNTER — TELEPHONE (OUTPATIENT)
Dept: ONCOLOGY | Facility: CLINIC | Age: 60
End: 2020-05-01

## 2020-05-01 NOTE — TELEPHONE ENCOUNTER
Patient needs CT and follow up appointment with Dr. Waller.  He had postponed these due to Covid-19.  He would like the CT scheduled at [facility] if possible.    [phone]

## 2020-05-07 ENCOUNTER — APPOINTMENT (OUTPATIENT)
Dept: CT IMAGING | Facility: HOSPITAL | Age: 60
End: 2020-05-07

## 2020-05-27 ENCOUNTER — LAB (OUTPATIENT)
Dept: LAB | Facility: HOSPITAL | Age: 60
End: 2020-05-27

## 2020-05-27 ENCOUNTER — HOSPITAL ENCOUNTER (OUTPATIENT)
Dept: CT IMAGING | Facility: HOSPITAL | Age: 60
Discharge: HOME | End: 2020-05-27
Admitting: NURSE PRACTITIONER

## 2020-05-27 DIAGNOSIS — C4A.9 MERKEL CELL CANCER (HCC): ICD-10-CM

## 2020-05-27 LAB
ALBUMIN SERPL-MCNC: 4.4 G/DL (ref 3.5–5.2)
ALBUMIN/GLOB SERPL: 1.7 G/DL
ALP SERPL-CCNC: 67 U/L (ref 39–117)
ALT SERPL W P-5'-P-CCNC: 23 U/L (ref 1–41)
ANION GAP SERPL CALCULATED.3IONS-SCNC: 16 MMOL/L (ref 5–15)
AST SERPL-CCNC: 22 U/L (ref 1–40)
BASOPHILS # BLD AUTO: 0.05 10*3/MM3 (ref 0–0.2)
BASOPHILS NFR BLD AUTO: 0.7 % (ref 0–1.5)
BILIRUB SERPL-MCNC: 0.9 MG/DL (ref 0.2–1.2)
BUN BLD-MCNC: 18 MG/DL (ref 6–20)
BUN/CREAT SERPL: 14.1 (ref 7–25)
CALCIUM SPEC-SCNC: 9.2 MG/DL (ref 8.6–10.5)
CHLORIDE SERPL-SCNC: 101 MMOL/L (ref 98–107)
CO2 SERPL-SCNC: 27 MMOL/L (ref 22–29)
CREAT BLD-MCNC: 1.28 MG/DL (ref 0.76–1.27)
CREAT BLDA-MCNC: 1.1 MG/DL (ref 0.6–1.3)
DEPRECATED RDW RBC AUTO: 48.3 FL (ref 37–54)
EOSINOPHIL # BLD AUTO: 0.22 10*3/MM3 (ref 0–0.4)
EOSINOPHIL NFR BLD AUTO: 3.1 % (ref 0.3–6.2)
ERYTHROCYTE [DISTWIDTH] IN BLOOD BY AUTOMATED COUNT: 14.6 % (ref 12.3–15.4)
GFR SERPL CREATININE-BSD FRML MDRD: 58 ML/MIN/1.73
GLOBULIN UR ELPH-MCNC: 2.6 GM/DL
GLUCOSE BLD-MCNC: 169 MG/DL (ref 65–99)
HCT VFR BLD AUTO: 42.4 % (ref 37.5–51)
HGB BLD-MCNC: 14.3 G/DL (ref 13–17.7)
IMM GRANULOCYTES # BLD AUTO: 0.06 10*3/MM3 (ref 0–0.05)
IMM GRANULOCYTES NFR BLD AUTO: 0.8 % (ref 0–0.5)
LYMPHOCYTES # BLD AUTO: 0.88 10*3/MM3 (ref 0.7–3.1)
LYMPHOCYTES NFR BLD AUTO: 12.4 % (ref 19.6–45.3)
MCH RBC QN AUTO: 30.6 PG (ref 26.6–33)
MCHC RBC AUTO-ENTMCNC: 33.7 G/DL (ref 31.5–35.7)
MCV RBC AUTO: 90.8 FL (ref 79–97)
MONOCYTES # BLD AUTO: 0.67 10*3/MM3 (ref 0.1–0.9)
MONOCYTES NFR BLD AUTO: 9.4 % (ref 5–12)
NEUTROPHILS # BLD AUTO: 5.21 10*3/MM3 (ref 1.7–7)
NEUTROPHILS NFR BLD AUTO: 73.6 % (ref 42.7–76)
NRBC BLD AUTO-RTO: 0 /100 WBC (ref 0–0.2)
PLATELET # BLD AUTO: 142 10*3/MM3 (ref 140–450)
PMV BLD AUTO: 11.2 FL (ref 6–12)
POTASSIUM BLD-SCNC: 4.2 MMOL/L (ref 3.5–5.2)
PROT SERPL-MCNC: 7 G/DL (ref 6–8.5)
RBC # BLD AUTO: 4.67 10*6/MM3 (ref 4.14–5.8)
SODIUM BLD-SCNC: 144 MMOL/L (ref 136–145)
WBC NRBC COR # BLD: 7.09 10*3/MM3 (ref 3.4–10.8)

## 2020-05-27 PROCEDURE — 71260 CT THORAX DX C+: CPT

## 2020-05-27 PROCEDURE — 80053 COMPREHEN METABOLIC PANEL: CPT

## 2020-05-27 PROCEDURE — 85025 COMPLETE CBC W/AUTO DIFF WBC: CPT

## 2020-05-27 PROCEDURE — 74177 CT ABD & PELVIS W/CONTRAST: CPT

## 2020-05-27 PROCEDURE — 36415 COLL VENOUS BLD VENIPUNCTURE: CPT

## 2020-05-27 PROCEDURE — 82565 ASSAY OF CREATININE: CPT

## 2020-05-27 PROCEDURE — 25010000002 IOPAMIDOL 61 % SOLUTION: Performed by: NURSE PRACTITIONER

## 2020-05-27 RX ADMIN — IOPAMIDOL 99 ML: 612 INJECTION, SOLUTION INTRAVENOUS at 15:33

## 2020-05-27 NOTE — PROGRESS NOTES
DATE OF VISIT: 5/29/2020    REASON FOR VISIT: Followup for Merkel cell carcinoma     HISTORY OF PRESENT ILLNESS: The patient is a very pleasant 59 y.o. 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  Whole body PET scan done on January 17, 2018 did not show any evidence of metastatic disease.  The patient was started on adjuvant chemotherapy with cisplatin and etoposide February 13, 2018.  Completed 4 cycles on April 20, 2018 The patient is here today for scheduled follow-up visit.    SUBJECTIVE: The patient is here today by himself.  All in all he has been doing fairly well.  He denies any new skin lesions.  No lymphadenopathy.  Continued to follow-up with dermatology.  He is anxious about the scan results.    PAST MEDICAL HISTORY/SOCIAL HISTORY/FAMILY HISTORY: Unchanged from my prior documentation done on February 6, 2018.     Review of Systems   Constitutional: Positive for fatigue. Negative for activity change, appetite change, chills, fever and unexpected weight change.   HENT: Negative for hearing loss, mouth sores, nosebleeds, sore throat and trouble swallowing.    Eyes: Negative for visual disturbance.   Respiratory: Negative for cough, chest tightness, shortness of breath and wheezing.    Cardiovascular: Positive for leg swelling. Negative for chest pain and palpitations.   Gastrointestinal: Negative for abdominal distention, abdominal pain, blood in stool, constipation, diarrhea, nausea, rectal pain and vomiting.   Endocrine: Negative for cold intolerance and heat intolerance.   Genitourinary: Negative for difficulty urinating, dysuria, frequency and urgency.   Musculoskeletal: Negative for arthralgias, back pain, gait problem, joint swelling and myalgias.   Skin: Negative for rash.   Neurological: Negative for dizziness, tremors, syncope, weakness, light-headedness, numbness and headaches.   Hematological: Negative for adenopathy. Does not bruise/bleed  easily.   Psychiatric/Behavioral: Negative for confusion, sleep disturbance and suicidal ideas. The patient is not nervous/anxious.          Current Outpatient Medications:   •  pravastatin (PRAVACHOL) 40 MG tablet, pravastatin 40 mg tablet  TAKE ONE TABLET BY MOUTH EVERY NIGHT AT BEDTIME, Disp: , Rfl:   •  amLODIPine-benazepril (LOTREL) 10-40 MG per capsule, Take 1 capsule by mouth Daily., Disp: , Rfl:   •  aspirin 81 MG tablet, Take 81 mg by mouth Daily., Disp: , Rfl:   •  atorvastatin (LIPITOR) 40 MG tablet, Take 40 mg by mouth Daily., Disp: , Rfl:   •  Blood Glucose Monitoring Suppl (ONE TOUCH ULTRA 2) w/Device kit, , Disp: , Rfl:   •  chlorthalidone (HYGROTON) 25 MG tablet, , Disp: , Rfl:   •  cholecalciferol (VITAMIN D3) 1000 units tablet, Take 1,000 Units by mouth Daily., Disp: , Rfl:   •  clonazePAM (KlonoPIN) 1 MG tablet, Take 1 mg by mouth 2 (Two) Times a Day As Needed for Anxiety or Seizures., Disp: , Rfl:   •  clotrimazole-betamethasone (LOTRISONE) 1-0.05 % cream, clotrimazole-betamethasone 1 %-0.05 % topical cream  APPLY TO THE AFFECTED AND SURROUNDING AREAS OF SKIN BY TOPICAL ROUTE 2 TIMES PER DAY IN THE MORNING AND EVENING PRN, Disp: , Rfl:   •  Dulaglutide (TRULICITY) 1.5 MG/0.5ML solution pen-injector, Inject 1 dose under the skin 1 (One) Time Per Week., Disp: , Rfl:   •  HYDROcodone-acetaminophen (NORCO)  MG per tablet, Take 1 tablet by mouth Every 6 (Six) Hours As Needed for Moderate Pain ., Disp: , Rfl:   •  Insulin Glargine (TOUJEO SOLOSTAR SC), Inject 12 Units under the skin As Needed., Disp: , Rfl:   •  metFORMIN (GLUCOPHAGE) 1000 MG tablet, Take 1,000 mg by mouth 2 (Two) Times a Day With Meals., Disp: , Rfl:   •  Multiple Vitamins-Minerals (MULTIVITAL PO), Take 1 tablet by mouth Daily., Disp: , Rfl:   •  NOVOLOG FLEXPEN 100 UNIT/ML solution pen-injector sc pen, , Disp: , Rfl:   •  ondansetron (ZOFRAN) 8 MG tablet, Take 1 tablet by mouth 3 (Three) Times a Day As Needed for Nausea or  "Vomiting., Disp: 30 tablet, Rfl: 5  •  ONE TOUCH ULTRA TEST test strip, , Disp: , Rfl:   •  ONETOUCH DELICA LANCETS 33G misc, , Disp: , Rfl:   •  ramipril (ALTACE) 5 MG capsule, , Disp: , Rfl:     PHYSICAL EXAMINATION:   /86   Pulse 90   Temp 97.9 °F (36.6 °C) (Temporal)   Resp 16   Ht 180.3 cm (70.98\")   Wt 123 kg (272 lb)   SpO2 99%   BMI 37.95 kg/m²    ECOG Performance Status: 1 - Symptomatic but completely ambulatory  General Appearance:  alert, cooperative, no apparent distress and appears stated age   Neurologic/Psychiatric: A&O x 3, gait steady, appropriate affect, strength 5/5 in all muscle groups   HEENT:  Normocephalic, without obvious abnormality, mucous membranes moist   Neck: Supple, symmetrical, trachea midline, no adenopathy;  No thyromegaly, masses, or tenderness   Lungs:   Clear to auscultation bilaterally; respirations regular, even, and unlabored bilaterally   Heart:  Regular rate and rhythm, no murmurs appreciated   Abdomen:   Soft, non-tender, non-distended and no organomegaly   Lymph nodes: No cervical, supraclavicular, inguinal or axillary adenopathy noted   Extremities: Normal, atraumatic; no clubbing, cyanosis, or edema    Skin: No rashes, ulcers, or suspicious lesions noted     Hospital Outpatient Visit on 05/27/2020   Component Date Value Ref Range Status   • Creatinine 05/27/2020 1.10  0.60 - 1.30 mg/dL Final    Serial Number: [id number]:  [id number]   Lab on 05/27/2020   Component Date Value Ref Range Status   • Glucose 05/27/2020 169* 65 - 99 mg/dL Final   • BUN 05/27/2020 18  6 - 20 mg/dL Final   • Creatinine 05/27/2020 1.28* 0.76 - 1.27 mg/dL Final   • Sodium 05/27/2020 144  136 - 145 mmol/L Final   • Potassium 05/27/2020 4.2  3.5 - 5.2 mmol/L Final   • Chloride 05/27/2020 101  98 - 107 mmol/L Final   • CO2 05/27/2020 27.0  22.0 - 29.0 mmol/L Final   • Calcium 05/27/2020 9.2  8.6 - 10.5 mg/dL Final   • Total Protein 05/27/2020 7.0  6.0 - 8.5 g/dL Final   • Albumin " 05/27/2020 4.40  3.50 - 5.20 g/dL Final   • ALT (SGPT) 05/27/2020 23  1 - 41 U/L Final   • AST (SGOT) 05/27/2020 22  1 - 40 U/L Final   • Alkaline Phosphatase 05/27/2020 67  39 - 117 U/L Final   • Total Bilirubin 05/27/2020 0.9  0.2 - 1.2 mg/dL Final   • eGFR Non African Amer 05/27/2020 58* >60 mL/min/1.73 Final   • Globulin 05/27/2020 2.6  gm/dL Final   • A/G Ratio 05/27/2020 1.7  g/dL Final   • BUN/Creatinine Ratio 05/27/2020 14.1  7.0 - 25.0 Final   • Anion Gap 05/27/2020 16.0* 5.0 - 15.0 mmol/L Final   • WBC 05/27/2020 7.09  3.40 - 10.80 10*3/mm3 Final   • RBC 05/27/2020 4.67  4.14 - 5.80 10*6/mm3 Final   • Hemoglobin 05/27/2020 14.3  13.0 - 17.7 g/dL Final   • Hematocrit 05/27/2020 42.4  37.5 - 51.0 % Final   • MCV 05/27/2020 90.8  79.0 - 97.0 fL Final   • MCH 05/27/2020 30.6  26.6 - 33.0 pg Final   • MCHC 05/27/2020 33.7  31.5 - 35.7 g/dL Final   • RDW 05/27/2020 14.6  12.3 - 15.4 % Final   • RDW-SD 05/27/2020 48.3  37.0 - 54.0 fl Final   • MPV 05/27/2020 11.2  6.0 - 12.0 fL Final   • Platelets 05/27/2020 142  140 - 450 10*3/mm3 Final   • Neutrophil % 05/27/2020 73.6  42.7 - 76.0 % Final   • Lymphocyte % 05/27/2020 12.4* 19.6 - 45.3 % Final   • Monocyte % 05/27/2020 9.4  5.0 - 12.0 % Final   • Eosinophil % 05/27/2020 3.1  0.3 - 6.2 % Final   • Basophil % 05/27/2020 0.7  0.0 - 1.5 % Final   • Immature Grans % 05/27/2020 0.8* 0.0 - 0.5 % Final   • Neutrophils, Absolute 05/27/2020 5.21  1.70 - 7.00 10*3/mm3 Final   • Lymphocytes, Absolute 05/27/2020 0.88  0.70 - 3.10 10*3/mm3 Final   • Monocytes, Absolute 05/27/2020 0.67  0.10 - 0.90 10*3/mm3 Final   • Eosinophils, Absolute 05/27/2020 0.22  0.00 - 0.40 10*3/mm3 Final   • Basophils, Absolute 05/27/2020 0.05  0.00 - 0.20 10*3/mm3 Final   • Immature Grans, Absolute 05/27/2020 0.06* 0.00 - 0.05 10*3/mm3 Final   • nRBC 05/27/2020 0.0  0.0 - 0.2 /100 WBC Final      Ct Chest With Contrast    Result Date: 5/28/2020  Narrative: EXAMINATION: CT CHEST W CONTRAST-, CT  ABDOMEN AND PELVIS W CONTRAST-  INDICATION: Restaging Merkel cell carcinoma; C4A.9-Merkel cell carcinoma, unspecified.  TECHNIQUE: CT chest, abdomen and pelvis with intravenous contrast.  The radiation dose reduction device was turned on for each scan per the ALARA (As Low as Reasonably Achievable) protocol.  COMPARISON: CT dated 10/14/2019.  FINDINGS: CHEST: Thyroid is mildly heterogeneous with coarsened calcifications adjacent to the right inferior thyroid lobe nodule unchanged from prior. No new dominant nodule. No bulky mediastinal adenopathy. Central airways are patent. Esophagus in normal course and caliber. Atherosclerotic nonaneurysmal thoracic aorta with patent great vessel origins. Central pulmonary arteries are grossly patent. Cardiac size within normal limits demonstrating stable appearance of pericardial fluid and coronary calcifications. Extended lung windows demonstrate calcified granuloma left lung apex. No new dominant nodule or mass. No consolidation or effusion. Degenerative changes of the thoracic spine along with dish appearance without acute cortical regularity. No chest wall soft tissue body findings. Bilateral gynecomastia noted.  ABDOMEN AND PELVIS: Liver demonstrates mild diffuse low-attenuation of hepatic steatosis without focal liver lesion. Gallbladder unremarkable. No biliary dilatation. Pancreas and spleen unremarkable. Adrenals demonstrate stable right adrenal adenoma. Kidneys without hydronephrosis or hydroureter. No bulky retroperitoneal adenopathy. Atherosclerotic nonaneurysmal patent abdominal aorta. Portal veins and IVC patent. GI tract evaluation without focal thickening or disproportionate dilatation of bowel to suggest mechanical obstructive process. No free fluid or intraabdominal free air. Degenerative changes of the lumbar spine and pelvis without aggressive osseous lesion. No soft tissue body wall findings of concern with stable small fat-containing periumbilical hernia  again noted. No bulky superficial inguinal adenopathy.      Impression: Stable appearance of the chest, abdomen and pelvis with postsurgical changes left inguinal region again noted,  however no superficial inguinal adenopathy development or evidence for active malignancy otherwise identified. No acute pathology.  D:  05/28/2020 E:  05/28/2020  This report was finalized on 5/28/2020 3:41 PM by Dr. David Desouza.      Ct Abdomen Pelvis With Contrast    Result Date: 5/28/2020  Narrative: EXAMINATION: CT CHEST W CONTRAST-, CT ABDOMEN AND PELVIS W CONTRAST-  INDICATION: Restaging Merkel cell carcinoma; C4A.9-Merkel cell carcinoma, unspecified.  TECHNIQUE: CT chest, abdomen and pelvis with intravenous contrast.  The radiation dose reduction device was turned on for each scan per the ALARA (As Low as Reasonably Achievable) protocol.  COMPARISON: CT dated 10/14/2019.  FINDINGS: CHEST: Thyroid is mildly heterogeneous with coarsened calcifications adjacent to the right inferior thyroid lobe nodule unchanged from prior. No new dominant nodule. No bulky mediastinal adenopathy. Central airways are patent. Esophagus in normal course and caliber. Atherosclerotic nonaneurysmal thoracic aorta with patent great vessel origins. Central pulmonary arteries are grossly patent. Cardiac size within normal limits demonstrating stable appearance of pericardial fluid and coronary calcifications. Extended lung windows demonstrate calcified granuloma left lung apex. No new dominant nodule or mass. No consolidation or effusion. Degenerative changes of the thoracic spine along with dish appearance without acute cortical regularity. No chest wall soft tissue body findings. Bilateral gynecomastia noted.  ABDOMEN AND PELVIS: Liver demonstrates mild diffuse low-attenuation of hepatic steatosis without focal liver lesion. Gallbladder unremarkable. No biliary dilatation. Pancreas and spleen unremarkable. Adrenals demonstrate stable right adrenal  adenoma. Kidneys without hydronephrosis or hydroureter. No bulky retroperitoneal adenopathy. Atherosclerotic nonaneurysmal patent abdominal aorta. Portal veins and IVC patent. GI tract evaluation without focal thickening or disproportionate dilatation of bowel to suggest mechanical obstructive process. No free fluid or intraabdominal free air. Degenerative changes of the lumbar spine and pelvis without aggressive osseous lesion. No soft tissue body wall findings of concern with stable small fat-containing periumbilical hernia again noted. No bulky superficial inguinal adenopathy.      Impression: Stable appearance of the chest, abdomen and pelvis with postsurgical changes left inguinal region again noted,  however no superficial inguinal adenopathy development or evidence for active malignancy otherwise identified. No acute pathology.  D:  05/28/2020 E:  05/28/2020  This report was finalized on 5/28/2020 3:41 PM by Dr. David Desouza.    (  No results found.    ASSESSMENT: The patient is a very pleasant 59 y.o. male  with Merkel cell carcinoma    PROBLEM LIST:  1. Merkel cell carcinoma [id number]  A. Presented with left groin mass.   B. Status post left groin excision of 5 cm mass.   C. Final pathology revealed Merkel cell carcinoma.   D. PET scan for clinical staging done 1/17/2018 failed to show evidence of metastatic disease.   E. Started adjuvant chemotherapy with cisplatin and etoposide February 13, 2018, status post 4 cycles, completed April 20, 2018.   F.  Completed adjuvant radiation to the left groin.  2. History of basal cell carcinoma of the skin.   3. Type 2 diabetes.   4. Hypertension.      PLAN:  1.  I reviewed the CAT scan results with the patient. I reviewed the images myself. I told the patient that he has stable findings with no evidence of new or progressive disease.    2.  The patient will follow-up with us in 6 months with repeat CAT scans of chest, abdomen, and pelvis ordered for prior to return.     3.  We will check labs today including CBC and CMP. We will follow up on the results and notify him of findings. We will repeat labs on return including CBC and CMP.  4.  I recommended the patient continue use of compression stockings as well as elevation of his extremities to help with mild lymphedema.   5.  Future treatment options may include immunotherapy if he has evidence of relapsed disease in the future.   6.  We'll continue Lotrel and hydrochlorothiazide for hypertension.  7.  We'll continue metformin as well as insulin for type 2 diabetes.  8. He will continue follow up with dermatology for skin exams.     Anastasia Waller MD  5/29/2020

## 2020-05-29 ENCOUNTER — OFFICE VISIT (OUTPATIENT)
Dept: ONCOLOGY | Facility: CLINIC | Age: 60
End: 2020-05-29

## 2020-05-29 VITALS
WEIGHT: 272 LBS | HEART RATE: 90 BPM | HEIGHT: 71 IN | TEMPERATURE: 97.9 F | SYSTOLIC BLOOD PRESSURE: 171 MMHG | DIASTOLIC BLOOD PRESSURE: 86 MMHG | OXYGEN SATURATION: 99 % | BODY MASS INDEX: 38.08 KG/M2 | RESPIRATION RATE: 16 BRPM

## 2020-05-29 DIAGNOSIS — C4A.9 MERKEL CELL CANCER (HCC): Primary | ICD-10-CM

## 2020-05-29 PROCEDURE — 99214 OFFICE O/P EST MOD 30 MIN: CPT | Performed by: INTERNAL MEDICINE

## 2020-05-29 RX ORDER — CLOTRIMAZOLE AND BETAMETHASONE DIPROPIONATE 10; .64 MG/G; MG/G
CREAM TOPICAL
COMMUNITY

## 2020-05-29 RX ORDER — PRAVASTATIN SODIUM 40 MG
TABLET ORAL
COMMUNITY
Start: 2014-02-04

## 2020-05-29 RX ORDER — RAMIPRIL 5 MG/1
CAPSULE ORAL
COMMUNITY
Start: 2020-04-02

## 2020-12-01 ENCOUNTER — HOSPITAL ENCOUNTER (OUTPATIENT)
Dept: CT IMAGING | Facility: HOSPITAL | Age: 60
Discharge: HOME | End: 2020-12-01

## 2020-12-01 ENCOUNTER — LAB (OUTPATIENT)
Dept: LAB | Facility: HOSPITAL | Age: 60
End: 2020-12-01

## 2020-12-01 DIAGNOSIS — C4A.9 MERKEL CELL CANCER (HCC): ICD-10-CM

## 2020-12-01 LAB
ALBUMIN SERPL-MCNC: 4.7 G/DL (ref 3.5–5.2)
ALBUMIN/GLOB SERPL: 1.7 G/DL
ALP SERPL-CCNC: 70 U/L (ref 39–117)
ALT SERPL W P-5'-P-CCNC: 20 U/L (ref 1–41)
ANION GAP SERPL CALCULATED.3IONS-SCNC: 11 MMOL/L (ref 5–15)
AST SERPL-CCNC: 20 U/L (ref 1–40)
BASOPHILS # BLD AUTO: 0.06 10*3/MM3 (ref 0–0.2)
BASOPHILS NFR BLD AUTO: 0.7 % (ref 0–1.5)
BILIRUB SERPL-MCNC: 0.9 MG/DL (ref 0–1.2)
BUN SERPL-MCNC: 13 MG/DL (ref 8–23)
BUN/CREAT SERPL: 12.1 (ref 7–25)
CALCIUM SPEC-SCNC: 9.5 MG/DL (ref 8.6–10.5)
CHLORIDE SERPL-SCNC: 98 MMOL/L (ref 98–107)
CO2 SERPL-SCNC: 29 MMOL/L (ref 22–29)
CREAT BLDA-MCNC: 1 MG/DL (ref 0.6–1.3)
CREAT SERPL-MCNC: 1.07 MG/DL (ref 0.76–1.27)
DEPRECATED RDW RBC AUTO: 46.5 FL (ref 37–54)
EOSINOPHIL # BLD AUTO: 0.17 10*3/MM3 (ref 0–0.4)
EOSINOPHIL NFR BLD AUTO: 2 % (ref 0.3–6.2)
ERYTHROCYTE [DISTWIDTH] IN BLOOD BY AUTOMATED COUNT: 14.2 % (ref 12.3–15.4)
GFR SERPL CREATININE-BSD FRML MDRD: 70 ML/MIN/1.73
GLOBULIN UR ELPH-MCNC: 2.7 GM/DL
GLUCOSE SERPL-MCNC: 205 MG/DL (ref 65–99)
HCT VFR BLD AUTO: 43.7 % (ref 37.5–51)
HGB BLD-MCNC: 14.6 G/DL (ref 13–17.7)
IMM GRANULOCYTES # BLD AUTO: 0.11 10*3/MM3 (ref 0–0.05)
IMM GRANULOCYTES NFR BLD AUTO: 1.3 % (ref 0–0.5)
LYMPHOCYTES # BLD AUTO: 0.88 10*3/MM3 (ref 0.7–3.1)
LYMPHOCYTES NFR BLD AUTO: 10.2 % (ref 19.6–45.3)
MCH RBC QN AUTO: 30.2 PG (ref 26.6–33)
MCHC RBC AUTO-ENTMCNC: 33.4 G/DL (ref 31.5–35.7)
MCV RBC AUTO: 90.3 FL (ref 79–97)
MONOCYTES # BLD AUTO: 0.63 10*3/MM3 (ref 0.1–0.9)
MONOCYTES NFR BLD AUTO: 7.3 % (ref 5–12)
NEUTROPHILS NFR BLD AUTO: 6.8 10*3/MM3 (ref 1.7–7)
NEUTROPHILS NFR BLD AUTO: 78.5 % (ref 42.7–76)
NRBC BLD AUTO-RTO: 0 /100 WBC (ref 0–0.2)
PLATELET # BLD AUTO: 153 10*3/MM3 (ref 140–450)
PMV BLD AUTO: 11.2 FL (ref 6–12)
POTASSIUM SERPL-SCNC: 4 MMOL/L (ref 3.5–5.2)
PROT SERPL-MCNC: 7.4 G/DL (ref 6–8.5)
RBC # BLD AUTO: 4.84 10*6/MM3 (ref 4.14–5.8)
SODIUM SERPL-SCNC: 138 MMOL/L (ref 136–145)
WBC # BLD AUTO: 8.65 10*3/MM3 (ref 3.4–10.8)

## 2020-12-01 PROCEDURE — 74177 CT ABD & PELVIS W/CONTRAST: CPT

## 2020-12-01 PROCEDURE — 71260 CT THORAX DX C+: CPT

## 2020-12-01 PROCEDURE — 82565 ASSAY OF CREATININE: CPT

## 2020-12-01 PROCEDURE — 80053 COMPREHEN METABOLIC PANEL: CPT

## 2020-12-01 PROCEDURE — 25010000002 IOPAMIDOL 61 % SOLUTION: Performed by: INTERNAL MEDICINE

## 2020-12-01 PROCEDURE — 85025 COMPLETE CBC W/AUTO DIFF WBC: CPT

## 2020-12-01 PROCEDURE — 36415 COLL VENOUS BLD VENIPUNCTURE: CPT

## 2020-12-01 RX ADMIN — IOPAMIDOL 95 ML: 612 INJECTION, SOLUTION INTRAVENOUS at 10:21

## 2020-12-02 ENCOUNTER — OFFICE VISIT (OUTPATIENT)
Dept: ONCOLOGY | Facility: CLINIC | Age: 60
End: 2020-12-02

## 2020-12-02 VITALS
SYSTOLIC BLOOD PRESSURE: 168 MMHG | BODY MASS INDEX: 40.88 KG/M2 | HEART RATE: 94 BPM | RESPIRATION RATE: 18 BRPM | DIASTOLIC BLOOD PRESSURE: 91 MMHG | HEIGHT: 71 IN | TEMPERATURE: 97.3 F | OXYGEN SATURATION: 99 % | WEIGHT: 292 LBS

## 2020-12-02 DIAGNOSIS — C4A.9 MERKEL CELL CANCER (HCC): Primary | ICD-10-CM

## 2020-12-02 PROCEDURE — 99213 OFFICE O/P EST LOW 20 MIN: CPT | Performed by: NURSE PRACTITIONER

## 2020-12-02 RX ORDER — AMLODIPINE BESYLATE 5 MG/1
TABLET ORAL
COMMUNITY

## 2020-12-02 RX ORDER — INSULIN GLARGINE AND LIXISENATIDE 100; 33 U/ML; UG/ML
INJECTION, SOLUTION SUBCUTANEOUS
COMMUNITY

## 2020-12-02 NOTE — PROGRESS NOTES
DATE OF VISIT: 12/2/2020    REASON FOR VISIT: Followup for Merkel cell carcinoma     HISTORY OF PRESENT ILLNESS: The patient is a very pleasant 60 y.o. 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  Whole body PET scan done on January 17, 2018 did not show any evidence of metastatic disease.  The patient was started on adjuvant chemotherapy with cisplatin and etoposide February 13, 2018.  Completed 4 cycles on April 20, 2018 The patient is here today for scheduled follow-up visit.    SUBJECTIVE: The patient is here today by himself. He has been doing well since his last visit. He denies any masses or swelling to his groin. He has no new suspicious skin lesions. He still has some mild swelling in his left leg, however it has not increased and improves with use of compression stockings. He has had significant changes in health. He is up to date on colonoscopy and skin exams and continues to see Dr. Sanchez four times per year for primary care. He denies new cough or shortness of breath.     PAST MEDICAL HISTORY/SOCIAL HISTORY/FAMILY HISTORY: Unchanged from my prior documentation done on February 6, 2018.     Review of Systems   Constitutional: Positive for fatigue. Negative for activity change, appetite change, chills, fever and unexpected weight change.   HENT: Negative for hearing loss, mouth sores, nosebleeds, sore throat and trouble swallowing.    Eyes: Negative for visual disturbance.   Respiratory: Negative for cough, chest tightness, shortness of breath and wheezing.    Cardiovascular: Positive for leg swelling. Negative for chest pain and palpitations.   Gastrointestinal: Negative for abdominal distention, abdominal pain, blood in stool, constipation, diarrhea, nausea, rectal pain and vomiting.   Endocrine: Negative for cold intolerance and heat intolerance.   Genitourinary: Negative for difficulty urinating, dysuria, frequency and urgency.    Musculoskeletal: Positive for arthralgias. Negative for back pain, gait problem, joint swelling and myalgias.   Skin: Negative for rash.   Neurological: Negative for dizziness, tremors, syncope, weakness, light-headedness, numbness and headaches.   Hematological: Negative for adenopathy. Does not bruise/bleed easily.   Psychiatric/Behavioral: Negative for confusion, sleep disturbance and suicidal ideas. The patient is not nervous/anxious.          Current Outpatient Medications:   •  amLODIPine (NORVASC) 5 MG tablet, amlodipine 5 mg tablet  Take 1 tablet every day by oral route., Disp: , Rfl:   •  amLODIPine-benazepril (LOTREL) 10-40 MG per capsule, Take 1 capsule by mouth Daily., Disp: , Rfl:   •  aspirin 81 MG tablet, Take 81 mg by mouth Daily., Disp: , Rfl:   •  atorvastatin (LIPITOR) 40 MG tablet, Take 40 mg by mouth Daily., Disp: , Rfl:   •  Blood Glucose Monitoring Suppl (ONE TOUCH ULTRA 2) w/Device kit, , Disp: , Rfl:   •  chlorthalidone (HYGROTON) 25 MG tablet, , Disp: , Rfl:   •  cholecalciferol (VITAMIN D3) 1000 units tablet, Take 1,000 Units by mouth Daily., Disp: , Rfl:   •  clonazePAM (KlonoPIN) 1 MG tablet, Take 1 mg by mouth 2 (Two) Times a Day As Needed for Anxiety or Seizures., Disp: , Rfl:   •  clotrimazole-betamethasone (LOTRISONE) 1-0.05 % cream, clotrimazole-betamethasone 1 %-0.05 % topical cream  APPLY TO THE AFFECTED AND SURROUNDING AREAS OF SKIN BY TOPICAL ROUTE 2 TIMES PER DAY IN THE MORNING AND EVENING PRN, Disp: , Rfl:   •  Dulaglutide (TRULICITY) 1.5 MG/0.5ML solution pen-injector, Inject 1 dose under the skin 1 (One) Time Per Week., Disp: , Rfl:   •  HYDROcodone-acetaminophen (NORCO)  MG per tablet, Take 1 tablet by mouth Every 6 (Six) Hours As Needed for Moderate Pain ., Disp: , Rfl:   •  Insulin Glargine (TOUJEO SOLOSTAR SC), Inject 12 Units under the skin As Needed., Disp: , Rfl:   •  Insulin Glargine-Lixisenatide (Soliqua) 100-33 UNT-MCG/ML solution pen-injector injection,  "Soliqua 100/33  100 unit-33 mcg/mL subcutaneous insulin pen  20-40u daily as directed, Disp: , Rfl:   •  metFORMIN (GLUCOPHAGE) 1000 MG tablet, Take 1,000 mg by mouth 2 (Two) Times a Day With Meals., Disp: , Rfl:   •  Multiple Vitamins-Minerals (MULTIVITAL PO), Take 1 tablet by mouth Daily., Disp: , Rfl:   •  NOVOLOG FLEXPEN 100 UNIT/ML solution pen-injector sc pen, , Disp: , Rfl:   •  ondansetron (ZOFRAN) 8 MG tablet, Take 1 tablet by mouth 3 (Three) Times a Day As Needed for Nausea or Vomiting., Disp: 30 tablet, Rfl: 5  •  ONE TOUCH ULTRA TEST test strip, , Disp: , Rfl:   •  ONETOUCH DELICA LANCETS 33G misc, , Disp: , Rfl:   •  pravastatin (PRAVACHOL) 40 MG tablet, pravastatin 40 mg tablet  TAKE ONE TABLET BY MOUTH EVERY NIGHT AT BEDTIME, Disp: , Rfl:   •  ramipril (ALTACE) 5 MG capsule, , Disp: , Rfl:   No current facility-administered medications for this visit.     PHYSICAL EXAMINATION:   /91   Pulse 94   Temp 97.3 °F (36.3 °C) (Temporal)   Resp 18   Ht 180.3 cm (70.98\")   Wt 132 kg (292 lb)   SpO2 99%   BMI 40.74 kg/m²    Pain Score    12/02/20 0954   PainSc: 0-No pain        ECOG Performance Status: 1 - Symptomatic but completely ambulatory  General Appearance:  alert, cooperative, no apparent distress and appears stated age   Neurologic/Psychiatric: A&O x 3, gait steady, appropriate affect, strength 5/5 in all muscle groups   HEENT:  Normocephalic, without obvious abnormality, mucous membranes moist   Neck: Supple, symmetrical, trachea midline, no adenopathy;  No thyromegaly, masses, or tenderness   Lungs:   Clear to auscultation bilaterally; respirations regular, even, and unlabored bilaterally   Heart:  Regular rate and rhythm, no murmurs appreciated   Abdomen:   Soft, non-tender, non-distended and no organomegaly   Lymph nodes: No cervical, supraclavicular, inguinal or axillary adenopathy noted   Extremities: Normal, atraumatic; no clubbing, or cyanosis, mild left lower extremity , or edema  "   Skin: No rashes, ulcers, or suspicious lesions noted     Hospital Outpatient Visit on 12/01/2020   Component Date Value Ref Range Status   • Creatinine 12/01/2020 1.00  0.60 - 1.30 mg/dL Final    Serial Number: [id number]:  [id number]   Lab on 12/01/2020   Component Date Value Ref Range Status   • Glucose 12/01/2020 205* 65 - 99 mg/dL Final   • BUN 12/01/2020 13  8 - 23 mg/dL Final   • Creatinine 12/01/2020 1.07  0.76 - 1.27 mg/dL Final   • Sodium 12/01/2020 138  136 - 145 mmol/L Final   • Potassium 12/01/2020 4.0  3.5 - 5.2 mmol/L Final   • Chloride 12/01/2020 98  98 - 107 mmol/L Final   • CO2 12/01/2020 29.0  22.0 - 29.0 mmol/L Final   • Calcium 12/01/2020 9.5  8.6 - 10.5 mg/dL Final   • Total Protein 12/01/2020 7.4  6.0 - 8.5 g/dL Final   • Albumin 12/01/2020 4.70  3.50 - 5.20 g/dL Final   • ALT (SGPT) 12/01/2020 20  1 - 41 U/L Final   • AST (SGOT) 12/01/2020 20  1 - 40 U/L Final   • Alkaline Phosphatase 12/01/2020 70  39 - 117 U/L Final   • Total Bilirubin 12/01/2020 0.9  0.0 - 1.2 mg/dL Final   • eGFR Non African Amer 12/01/2020 70  >60 mL/min/1.73 Final   • Globulin 12/01/2020 2.7  gm/dL Final   • A/G Ratio 12/01/2020 1.7  g/dL Final   • BUN/Creatinine Ratio 12/01/2020 12.1  7.0 - 25.0 Final   • Anion Gap 12/01/2020 11.0  5.0 - 15.0 mmol/L Final   • WBC 12/01/2020 8.65  3.40 - 10.80 10*3/mm3 Final   • RBC 12/01/2020 4.84  4.14 - 5.80 10*6/mm3 Final   • Hemoglobin 12/01/2020 14.6  13.0 - 17.7 g/dL Final   • Hematocrit 12/01/2020 43.7  37.5 - 51.0 % Final   • MCV 12/01/2020 90.3  79.0 - 97.0 fL Final   • MCH 12/01/2020 30.2  26.6 - 33.0 pg Final   • MCHC 12/01/2020 33.4  31.5 - 35.7 g/dL Final   • RDW 12/01/2020 14.2  12.3 - 15.4 % Final   • RDW-SD 12/01/2020 46.5  37.0 - 54.0 fl Final   • MPV 12/01/2020 11.2  6.0 - 12.0 fL Final   • Platelets 12/01/2020 153  140 - 450 10*3/mm3 Final   • Neutrophil % 12/01/2020 78.5* 42.7 - 76.0 % Final   • Lymphocyte % 12/01/2020 10.2* 19.6 - 45.3 % Final   • Monocyte %  12/01/2020 7.3  5.0 - 12.0 % Final   • Eosinophil % 12/01/2020 2.0  0.3 - 6.2 % Final   • Basophil % 12/01/2020 0.7  0.0 - 1.5 % Final   • Immature Grans % 12/01/2020 1.3* 0.0 - 0.5 % Final   • Neutrophils, Absolute 12/01/2020 6.80  1.70 - 7.00 10*3/mm3 Final   • Lymphocytes, Absolute 12/01/2020 0.88  0.70 - 3.10 10*3/mm3 Final   • Monocytes, Absolute 12/01/2020 0.63  0.10 - 0.90 10*3/mm3 Final   • Eosinophils, Absolute 12/01/2020 0.17  0.00 - 0.40 10*3/mm3 Final   • Basophils, Absolute 12/01/2020 0.06  0.00 - 0.20 10*3/mm3 Final   • Immature Grans, Absolute 12/01/2020 0.11* 0.00 - 0.05 10*3/mm3 Final   • nRBC 12/01/2020 0.0  0.0 - 0.2 /100 WBC Final      Ct Chest With Contrast    Result Date: 12/1/2020  Narrative: EXAMINATION: CT CHEST W CONTRAST-, CT ABDOMEN AND PELVIS W CONTRAST-  INDICATION: C4A.9-Merkel cell carcinoma, unspecified; followup Merkel cell.  TECHNIQUE: Multiple axial CT imaging was obtained of the chest, abdomen and pelvis following the administration of intravenous contrast.  The radiation dose reduction device was turned on for each scan per the ALARA (As Low as Reasonably Achievable) protocol.  COMPARISON: 05/27/2020.  FINDINGS: CHEST: Thyroid is heterogeneous in appearance with no underlying mass or lesion. No mediastinal mass or adenopathy. The cardiac chambers are within normal limits. No pericardial effusion. No bulky hilar or axillary lymphadenopathy. The lung parenchyma is clear. No parenchymal consolidation, pulmonary mass or nodule. Degenerative change is seen within the spine.  ABDOMEN: The liver is homogeneous. The spleen is unremarkable. Nodularity identified within the adrenal gland on the right containing macroscopic fat suggesting a myelolipoma. This is stable and unchanged when compared to the prior study. The kidneys are unremarkable. Pancreas is homogeneous. No abdominal or retroperitoneal lymphadenopathy. No free fluid or free air. There is a small periumbilical hernia just  left of midline containing mesenteric fat only.  PELVIS: The pelvic organs are unremarkable. The pelvic portion of the gastrointestinal tract is within normal limits. No free fluid or free air. Vascular calcification is seen of the abdominal aorta and iliac vessels. Degenerative change is seen within the spine and pelvis.  Delayed imaging reveals contrast seen in the renal collecting systems bilaterally as well as within the ureters and bladder with no evidence of obvious obstruction.      Impression: Nodularity identified within the right adrenal gland containing macroscopic fat suggesting a stable myelolipoma. The chest is unremarkable with no acute intrathoracic abnormality. No evidence of recurrence or metastatic disease.  D:  12/01/2020 E:  12/01/2020      Ct Abdomen Pelvis With Contrast    Result Date: 12/1/2020  Narrative: EXAMINATION: CT CHEST W CONTRAST-, CT ABDOMEN AND PELVIS W CONTRAST-  INDICATION: C4A.9-Merkel cell carcinoma, unspecified; followup Merkel cell.  TECHNIQUE: Multiple axial CT imaging was obtained of the chest, abdomen and pelvis following the administration of intravenous contrast.  The radiation dose reduction device was turned on for each scan per the ALARA (As Low as Reasonably Achievable) protocol.  COMPARISON: 05/27/2020.  FINDINGS: CHEST: Thyroid is heterogeneous in appearance with no underlying mass or lesion. No mediastinal mass or adenopathy. The cardiac chambers are within normal limits. No pericardial effusion. No bulky hilar or axillary lymphadenopathy. The lung parenchyma is clear. No parenchymal consolidation, pulmonary mass or nodule. Degenerative change is seen within the spine.  ABDOMEN: The liver is homogeneous. The spleen is unremarkable. Nodularity identified within the adrenal gland on the right containing macroscopic fat suggesting a myelolipoma. This is stable and unchanged when compared to the prior study. The kidneys are unremarkable. Pancreas is homogeneous. No  abdominal or retroperitoneal lymphadenopathy. No free fluid or free air. There is a small periumbilical hernia just left of midline containing mesenteric fat only.  PELVIS: The pelvic organs are unremarkable. The pelvic portion of the gastrointestinal tract is within normal limits. No free fluid or free air. Vascular calcification is seen of the abdominal aorta and iliac vessels. Degenerative change is seen within the spine and pelvis.  Delayed imaging reveals contrast seen in the renal collecting systems bilaterally as well as within the ureters and bladder with no evidence of obvious obstruction.      Impression: Nodularity identified within the right adrenal gland containing macroscopic fat suggesting a stable myelolipoma. The chest is unremarkable with no acute intrathoracic abnormality. No evidence of recurrence or metastatic disease.  D:  12/01/2020 E:  12/01/2020    (  No results found.    ASSESSMENT: The patient is a very pleasant 60 y.o. male  with Merkel cell carcinoma    PROBLEM LIST:  1. Merkel cell carcinoma [id number]  A. Presented with left groin mass.   B. Status post left groin excision of 5 cm mass.   C. Final pathology revealed Merkel cell carcinoma.   D. PET scan for clinical staging done 1/17/2018 failed to show evidence of metastatic disease.   E. Started adjuvant chemotherapy with cisplatin and etoposide February 13, 2018, status post 4 cycles, completed April 20, 2018.   F.  Completed adjuvant radiation to the left groin.  2. History of basal cell carcinoma of the skin.   3. Type 2 diabetes.   4. Hypertension.  5. Lymphedema to left leg    PLAN:  1.  I reviewed the CAT scan results with the patient. I reviewed the images myself. I told the patient that he has stable findings without evidence of relapsed or progressive disease. 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   2. I reviewed the lab results from 12/1/2020 with the patient. I reassured  him that his blood counts are within normal limits as well as kidney function, liver enzymes, and electrolytes. His blood sugar is slightly elevated consistent with known type 2 diabetes.   3. We will see the patient back in 6 months with repeat labs including CBC and CMP, as well as repeat CAT scans of chest, abdomen and pelvis ordered for prior to return.    4. If the patient has evidence of progressive disease in the future, he may be eligible for immunotherapy.  5.  He will continue use of compression stockings and elevation of extremities to help ith mild lower extremity lymphedema.   6.  We'll continue Lotrel and hydrochlorothiazide for hypertension.  7.  We'll continue metformin, Trulicity, and insulin for type 2 diabetes. This is being managed by Dr. Sanchez.  8. He will continue follow up with dermatology for routine skin exams.     Nori Norton, APRN  12/2/2020

## 2021-06-03 ENCOUNTER — LAB (OUTPATIENT)
Dept: LAB | Facility: HOSPITAL | Age: 61
End: 2021-06-03

## 2021-06-03 ENCOUNTER — HOSPITAL ENCOUNTER (OUTPATIENT)
Dept: CT IMAGING | Facility: HOSPITAL | Age: 61
Discharge: HOME | End: 2021-06-03

## 2021-06-03 DIAGNOSIS — C4A.9 MERKEL CELL CANCER (HCC): ICD-10-CM

## 2021-06-03 LAB
ALBUMIN SERPL-MCNC: 4.4 G/DL (ref 3.5–5.2)
ALBUMIN/GLOB SERPL: 1.8 G/DL
ALP SERPL-CCNC: 76 U/L (ref 39–117)
ALT SERPL W P-5'-P-CCNC: 23 U/L (ref 1–41)
ANION GAP SERPL CALCULATED.3IONS-SCNC: 13 MMOL/L (ref 5–15)
AST SERPL-CCNC: 16 U/L (ref 1–40)
BASOPHILS # BLD AUTO: 0.05 10*3/MM3 (ref 0–0.2)
BASOPHILS NFR BLD AUTO: 0.6 % (ref 0–1.5)
BILIRUB SERPL-MCNC: 1 MG/DL (ref 0–1.2)
BUN SERPL-MCNC: 16 MG/DL (ref 8–23)
BUN/CREAT SERPL: 16.3 (ref 7–25)
CALCIUM SPEC-SCNC: 9.1 MG/DL (ref 8.6–10.5)
CHLORIDE SERPL-SCNC: 99 MMOL/L (ref 98–107)
CO2 SERPL-SCNC: 29 MMOL/L (ref 22–29)
CREAT BLDA-MCNC: 1 MG/DL (ref 0.6–1.3)
CREAT SERPL-MCNC: 0.98 MG/DL (ref 0.76–1.27)
DEPRECATED RDW RBC AUTO: 46.5 FL (ref 37–54)
EOSINOPHIL # BLD AUTO: 0.15 10*3/MM3 (ref 0–0.4)
EOSINOPHIL NFR BLD AUTO: 1.9 % (ref 0.3–6.2)
ERYTHROCYTE [DISTWIDTH] IN BLOOD BY AUTOMATED COUNT: 14.6 % (ref 12.3–15.4)
GFR SERPL CREATININE-BSD FRML MDRD: 78 ML/MIN/1.73
GLOBULIN UR ELPH-MCNC: 2.4 GM/DL
GLUCOSE SERPL-MCNC: 250 MG/DL (ref 65–99)
HCT VFR BLD AUTO: 41.4 % (ref 37.5–51)
HGB BLD-MCNC: 14.3 G/DL (ref 13–17.7)
IMM GRANULOCYTES # BLD AUTO: 0.08 10*3/MM3 (ref 0–0.05)
IMM GRANULOCYTES NFR BLD AUTO: 1 % (ref 0–0.5)
LYMPHOCYTES # BLD AUTO: 0.82 10*3/MM3 (ref 0.7–3.1)
LYMPHOCYTES NFR BLD AUTO: 10.5 % (ref 19.6–45.3)
MCH RBC QN AUTO: 30.8 PG (ref 26.6–33)
MCHC RBC AUTO-ENTMCNC: 34.5 G/DL (ref 31.5–35.7)
MCV RBC AUTO: 89 FL (ref 79–97)
MONOCYTES # BLD AUTO: 0.6 10*3/MM3 (ref 0.1–0.9)
MONOCYTES NFR BLD AUTO: 7.7 % (ref 5–12)
NEUTROPHILS NFR BLD AUTO: 6.12 10*3/MM3 (ref 1.7–7)
NEUTROPHILS NFR BLD AUTO: 78.3 % (ref 42.7–76)
NRBC BLD AUTO-RTO: 0 /100 WBC (ref 0–0.2)
PLATELET # BLD AUTO: 147 10*3/MM3 (ref 140–450)
PMV BLD AUTO: 11.9 FL (ref 6–12)
POTASSIUM SERPL-SCNC: 4 MMOL/L (ref 3.5–5.2)
PROT SERPL-MCNC: 6.8 G/DL (ref 6–8.5)
RBC # BLD AUTO: 4.65 10*6/MM3 (ref 4.14–5.8)
SODIUM SERPL-SCNC: 141 MMOL/L (ref 136–145)
WBC # BLD AUTO: 7.82 10*3/MM3 (ref 3.4–10.8)

## 2021-06-03 PROCEDURE — 85025 COMPLETE CBC W/AUTO DIFF WBC: CPT

## 2021-06-03 PROCEDURE — 80053 COMPREHEN METABOLIC PANEL: CPT

## 2021-06-03 PROCEDURE — 25010000002 IOPAMIDOL 61 % SOLUTION: Performed by: NURSE PRACTITIONER

## 2021-06-03 PROCEDURE — 36415 COLL VENOUS BLD VENIPUNCTURE: CPT

## 2021-06-03 PROCEDURE — 74177 CT ABD & PELVIS W/CONTRAST: CPT

## 2021-06-03 PROCEDURE — 82565 ASSAY OF CREATININE: CPT

## 2021-06-03 PROCEDURE — 71260 CT THORAX DX C+: CPT

## 2021-06-03 RX ADMIN — IOPAMIDOL 95 ML: 612 INJECTION, SOLUTION INTRAVENOUS at 09:18

## 2021-06-04 ENCOUNTER — OFFICE VISIT (OUTPATIENT)
Dept: ONCOLOGY | Facility: CLINIC | Age: 61
End: 2021-06-04

## 2021-06-04 VITALS
TEMPERATURE: 97.3 F | WEIGHT: 288 LBS | DIASTOLIC BLOOD PRESSURE: 73 MMHG | RESPIRATION RATE: 18 BRPM | BODY MASS INDEX: 40.32 KG/M2 | HEIGHT: 71 IN | HEART RATE: 93 BPM | SYSTOLIC BLOOD PRESSURE: 149 MMHG | OXYGEN SATURATION: 98 %

## 2021-06-04 DIAGNOSIS — C4A.9 MERKEL CELL CANCER (HCC): Primary | ICD-10-CM

## 2021-06-04 PROCEDURE — 99214 OFFICE O/P EST MOD 30 MIN: CPT | Performed by: INTERNAL MEDICINE

## 2021-06-04 RX ORDER — PEN NEEDLE, DIABETIC 31 G X1/4"
NEEDLE, DISPOSABLE MISCELLANEOUS
COMMUNITY
Start: 2021-04-24

## 2021-06-04 NOTE — PROGRESS NOTES
DATE OF VISIT: 6/4/2021    REASON FOR VISIT: Followup for Merkel cell carcinoma     HISTORY OF PRESENT ILLNESS: The patient is a very pleasant 60 y.o. 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  Whole body PET scan done on January 17, 2018 did not show any evidence of metastatic disease.  The patient was started on adjuvant chemotherapy with cisplatin and etoposide February 13, 2018.  Completed 4 cycles on April 20, 2018 The patient is here today for scheduled follow-up visit.    SUBJECTIVE: The patient is here today by himself. He has been doing well since his last visit. He denies any masses or swelling to his groin. He has no new suspicious skin lesions    PAST MEDICAL HISTORY/SOCIAL HISTORY/FAMILY HISTORY: Unchanged from my prior documentation done on February 6, 2018.     Review of Systems   Constitutional: Positive for fatigue. Negative for activity change, appetite change, chills, fever and unexpected weight change.   HENT: Negative for hearing loss, mouth sores, nosebleeds, sore throat and trouble swallowing.    Eyes: Negative for visual disturbance.   Respiratory: Negative for cough, chest tightness, shortness of breath and wheezing.    Cardiovascular: Positive for leg swelling. Negative for chest pain and palpitations.   Gastrointestinal: Negative for abdominal distention, abdominal pain, blood in stool, constipation, diarrhea, nausea, rectal pain and vomiting.   Endocrine: Negative for cold intolerance and heat intolerance.   Genitourinary: Negative for difficulty urinating, dysuria, frequency and urgency.   Musculoskeletal: Positive for arthralgias. Negative for back pain, gait problem, joint swelling and myalgias.   Skin: Negative for rash.   Neurological: Negative for dizziness, tremors, syncope, weakness, light-headedness, numbness and headaches.   Hematological: Negative for adenopathy. Does not bruise/bleed easily.   Psychiatric/Behavioral:  Negative for confusion, sleep disturbance and suicidal ideas. The patient is not nervous/anxious.          Current Outpatient Medications:   •  amLODIPine (NORVASC) 5 MG tablet, amlodipine 5 mg tablet  Take 1 tablet every day by oral route., Disp: , Rfl:   •  amLODIPine-benazepril (LOTREL) 10-40 MG per capsule, Take 1 capsule by mouth Daily., Disp: , Rfl:   •  aspirin 81 MG tablet, Take 81 mg by mouth Daily., Disp: , Rfl:   •  atorvastatin (LIPITOR) 40 MG tablet, Take 40 mg by mouth Daily., Disp: , Rfl:   •  Blood Glucose Monitoring Suppl (ONE TOUCH ULTRA 2) w/Device kit, , Disp: , Rfl:   •  chlorthalidone (HYGROTON) 25 MG tablet, , Disp: , Rfl:   •  cholecalciferol (VITAMIN D3) 1000 units tablet, Take 1,000 Units by mouth Daily., Disp: , Rfl:   •  clonazePAM (KlonoPIN) 1 MG tablet, Take 1 mg by mouth 2 (Two) Times a Day As Needed for Anxiety or Seizures., Disp: , Rfl:   •  clotrimazole-betamethasone (LOTRISONE) 1-0.05 % cream, clotrimazole-betamethasone 1 %-0.05 % topical cream  APPLY TO THE AFFECTED AND SURROUNDING AREAS OF SKIN BY TOPICAL ROUTE 2 TIMES PER DAY IN THE MORNING AND EVENING PRN, Disp: , Rfl:   •  Dulaglutide (TRULICITY) 1.5 MG/0.5ML solution pen-injector, Inject 1 dose under the skin 1 (One) Time Per Week., Disp: , Rfl:   •  HYDROcodone-acetaminophen (NORCO)  MG per tablet, Take 1 tablet by mouth Every 6 (Six) Hours As Needed for Moderate Pain ., Disp: , Rfl:   •  Insulin Glargine (TOUJEO SOLOSTAR SC), Inject 12 Units under the skin As Needed., Disp: , Rfl:   •  Insulin Glargine-Lixisenatide (Soliqua) 100-33 UNT-MCG/ML solution pen-injector injection, Soliqua 100/33  100 unit-33 mcg/mL subcutaneous insulin pen  20-40u daily as directed, Disp: , Rfl:   •  [employer] Pen Needles 31G X 6 MM misc, , Disp: , Rfl:   •  metFORMIN (GLUCOPHAGE) 1000 MG tablet, Take 1,000 mg by mouth 2 (Two) Times a Day With Meals., Disp: , Rfl:   •  Multiple Vitamins-Minerals (MULTIVITAL PO), Take 1 tablet by mouth Daily.,  "Disp: , Rfl:   •  NOVOLOG FLEXPEN 100 UNIT/ML solution pen-injector sc pen, , Disp: , Rfl:   •  ondansetron (ZOFRAN) 8 MG tablet, Take 1 tablet by mouth 3 (Three) Times a Day As Needed for Nausea or Vomiting., Disp: 30 tablet, Rfl: 5  •  ONE TOUCH ULTRA TEST test strip, , Disp: , Rfl:   •  ONETOUCH DELICA LANCETS 33G misc, , Disp: , Rfl:   •  pravastatin (PRAVACHOL) 40 MG tablet, pravastatin 40 mg tablet  TAKE ONE TABLET BY MOUTH EVERY NIGHT AT BEDTIME, Disp: , Rfl:   •  ramipril (ALTACE) 5 MG capsule, , Disp: , Rfl:     PHYSICAL EXAMINATION:   Ht 180.3 cm (70.98\")   BMI 40.74 kg/m²    There were no vitals filed for this visit.     ECOG Performance Status: 1 - Symptomatic but completely ambulatory  General Appearance:  alert, cooperative, no apparent distress and appears stated age   Neurologic/Psychiatric: A&O x 3, gait steady, appropriate affect, strength 5/5 in all muscle groups   HEENT:  Normocephalic, without obvious abnormality, mucous membranes moist   Neck: Supple, symmetrical, trachea midline, no adenopathy;  No thyromegaly, masses, or tenderness   Lungs:   Clear to auscultation bilaterally; respirations regular, even, and unlabored bilaterally   Heart:  Regular rate and rhythm, no murmurs appreciated   Abdomen:   Soft, non-tender, non-distended and no organomegaly   Lymph nodes: No cervical, supraclavicular, inguinal or axillary adenopathy noted   Extremities: Normal, atraumatic; no clubbing, or cyanosis, mild left lower extremity , or edema    Skin: No rashes, ulcers, or suspicious lesions noted     Hospital Outpatient Visit on 06/03/2021   Component Date Value Ref Range Status   • Creatinine 06/03/2021 1.00  0.60 - 1.30 mg/dL Final    Serial Number: [id number]:  [id number]   Lab on 06/03/2021   Component Date Value Ref Range Status   • Glucose 06/03/2021 250* 65 - 99 mg/dL Final   • BUN 06/03/2021 16  8 - 23 mg/dL Final   • Creatinine 06/03/2021 0.98  0.76 - 1.27 mg/dL Final   • Sodium 06/03/2021 141  " 136 - 145 mmol/L Final   • Potassium 06/03/2021 4.0  3.5 - 5.2 mmol/L Final   • Chloride 06/03/2021 99  98 - 107 mmol/L Final   • CO2 06/03/2021 29.0  22.0 - 29.0 mmol/L Final   • Calcium 06/03/2021 9.1  8.6 - 10.5 mg/dL Final   • Total Protein 06/03/2021 6.8  6.0 - 8.5 g/dL Final   • Albumin 06/03/2021 4.40  3.50 - 5.20 g/dL Final   • ALT (SGPT) 06/03/2021 23  1 - 41 U/L Final   • AST (SGOT) 06/03/2021 16  1 - 40 U/L Final   • Alkaline Phosphatase 06/03/2021 76  39 - 117 U/L Final   • Total Bilirubin 06/03/2021 1.0  0.0 - 1.2 mg/dL Final   • eGFR Non African Amer 06/03/2021 78  >60 mL/min/1.73 Final   • Globulin 06/03/2021 2.4  gm/dL Final   • A/G Ratio 06/03/2021 1.8  g/dL Final   • BUN/Creatinine Ratio 06/03/2021 16.3  7.0 - 25.0 Final   • Anion Gap 06/03/2021 13.0  5.0 - 15.0 mmol/L Final   • WBC 06/03/2021 7.82  3.40 - 10.80 10*3/mm3 Final   • RBC 06/03/2021 4.65  4.14 - 5.80 10*6/mm3 Final   • Hemoglobin 06/03/2021 14.3  13.0 - 17.7 g/dL Final   • Hematocrit 06/03/2021 41.4  37.5 - 51.0 % Final   • MCV 06/03/2021 89.0  79.0 - 97.0 fL Final   • MCH 06/03/2021 30.8  26.6 - 33.0 pg Final   • MCHC 06/03/2021 34.5  31.5 - 35.7 g/dL Final   • RDW 06/03/2021 14.6  12.3 - 15.4 % Final   • RDW-SD 06/03/2021 46.5  37.0 - 54.0 fl Final   • MPV 06/03/2021 11.9  6.0 - 12.0 fL Final   • Platelets 06/03/2021 147  140 - 450 10*3/mm3 Final   • Neutrophil % 06/03/2021 78.3* 42.7 - 76.0 % Final   • Lymphocyte % 06/03/2021 10.5* 19.6 - 45.3 % Final   • Monocyte % 06/03/2021 7.7  5.0 - 12.0 % Final   • Eosinophil % 06/03/2021 1.9  0.3 - 6.2 % Final   • Basophil % 06/03/2021 0.6  0.0 - 1.5 % Final   • Immature Grans % 06/03/2021 1.0* 0.0 - 0.5 % Final   • Neutrophils, Absolute 06/03/2021 6.12  1.70 - 7.00 10*3/mm3 Final   • Lymphocytes, Absolute 06/03/2021 0.82  0.70 - 3.10 10*3/mm3 Final   • Monocytes, Absolute 06/03/2021 0.60  0.10 - 0.90 10*3/mm3 Final   • Eosinophils, Absolute 06/03/2021 0.15  0.00 - 0.40 10*3/mm3 Final   •  Basophils, Absolute 06/03/2021 0.05  0.00 - 0.20 10*3/mm3 Final   • Immature Grans, Absolute 06/03/2021 0.08* 0.00 - 0.05 10*3/mm3 Final   • nRBC 06/03/2021 0.0  0.0 - 0.2 /100 WBC Final      CT Chest With Contrast Diagnostic, CT Abdomen Pelvis With Contrast    Result Date: 6/3/2021  Narrative: EXAMINATION: CT CHEST W CONTRAST DIAGNOSTIC-, CT ABDOMEN PELVIS W CONTRAST-  INDICATION: restaging merkel cell carcinoma; C4A.9-Merkel cell carcinoma, unspecified  TECHNIQUE: Axial IV contrast-enhanced CT of the chest, abdomen and pelvis with multiplanar reconstruction  The radiation dose reduction device was turned on for each scan per the ALARA (As Low as Reasonably Achievable) protocol.  COMPARISON: 12/1/2020  FINDINGS: Chest: No pathologic axillary adenopathy or other worrisome body wall soft tissue finding in the chest. No pathologic mediastinal or hilar adenopathy. No pleural or pericardial effusion. Evaluation of the osseous structures demonstrates no evidence of acute fracture or aggressive osseous lesion, with redemonstrated multilevel spondylosis changes suggesting spondyloarthropathy. Evaluation of the lung fields demonstrates no evidence of acute infectious or inflammatory process. No new or enlarging suspicious pulmonary nodules. Nonaneurysmal mildly atherosclerotic thoracic aorta.  Abdomen and pelvis: The liver, spleen, pancreas and bilateral adrenal glands are stable, including fat density nodule involving the right adrenal gland, likely myelolipoma. The gallbladder is unremarkable. Small and large bowel loops are nondilated. The appendix is normal. No new suspicious mesenteric nodularity. No free fluid or pneumoperitoneum. No suspicious focal bowel wall thickening. The pelvic viscera are unremarkable.      Impression: Stable CT of the chest, abdomen and pelvis with no evidence of recurrent or metastatic disease.   This report was finalized on 6/3/2021 9:42 AM by Arnav Hayes.    (  No results  found.    ASSESSMENT: The patient is a very pleasant 60 y.o. male  with Merkel cell carcinoma    PROBLEM LIST:  1. Merkel cell carcinoma [id number]  A. Presented with left groin mass.   B. Status post left groin excision of 5 cm mass.   C. Final pathology revealed Merkel cell carcinoma.   D. PET scan for clinical staging done 1/17/2018 failed to show evidence of metastatic disease.   E. Started adjuvant chemotherapy with cisplatin and etoposide February 13, 2018, status post 4 cycles, completed April 20, 2018.   F.  Completed adjuvant radiation to the left groin.  2. History of basal cell carcinoma of the skin.   3. Type 2 diabetes.   4. Hypertension.  5. Lymphedema to left leg    PLAN:  1.  I reviewed the CAT scan results with the patient. I reviewed the images myself.  My personal interpretation: He has stable findings without evidence of relapsed or progressive disease.  2. I reviewed the lab results from [place] 3, 2021 reassured her CBC is normal his blood sugar was elevated to 50 CMP otherwise normal.   3.  I will change his follow-up to annual visits at this point with blood work and CT scans.   4.  He will continue use of compression stockings and elevation of extremities to help ith mild lower extremity lymphedema.   5.  We'll continue Lotrel and hydrochlorothiazide for hypertension.  6.  We'll continue metformin, Trulicity, and insulin for type 2 diabetes. This is being managed by Dr. Sanchez.  7. He will continue follow up with dermatology for routine skin exams.     Anastasia Waller MD  6/4/2021

## 2021-06-17 ENCOUNTER — TRANSCRIBE ORDERS (OUTPATIENT)
Dept: ADMINISTRATIVE | Facility: HOSPITAL | Age: 61
End: 2021-06-17

## 2021-06-17 DIAGNOSIS — E04.1 NONTOXIC UNINODULAR GOITER: Primary | ICD-10-CM

## 2021-06-17 DIAGNOSIS — E04.1 NONTOXIC SINGLE THYROID NODULE: ICD-10-CM

## 2021-07-02 ENCOUNTER — HOSPITAL ENCOUNTER (OUTPATIENT)
Dept: ULTRASOUND IMAGING | Facility: HOSPITAL | Age: 61
Discharge: HOME | End: 2021-07-02
Admitting: INTERNAL MEDICINE

## 2021-07-02 DIAGNOSIS — E04.1 NONTOXIC SINGLE THYROID NODULE: ICD-10-CM

## 2021-07-02 PROCEDURE — 76536 US EXAM OF HEAD AND NECK: CPT

## 2022-06-02 ENCOUNTER — HOSPITAL ENCOUNTER (OUTPATIENT)
Dept: CT IMAGING | Facility: HOSPITAL | Age: 62
Discharge: HOME | End: 2022-06-02

## 2022-06-02 ENCOUNTER — LAB (OUTPATIENT)
Dept: LAB | Facility: HOSPITAL | Age: 62
End: 2022-06-02

## 2022-06-02 DIAGNOSIS — C4A.9 MERKEL CELL CANCER: ICD-10-CM

## 2022-06-02 LAB
ALBUMIN SERPL-MCNC: 4.4 G/DL (ref 3.5–5.2)
ALBUMIN/GLOB SERPL: 1.6 G/DL
ALP SERPL-CCNC: 69 U/L (ref 39–117)
ALT SERPL W P-5'-P-CCNC: 24 U/L (ref 1–41)
ANION GAP SERPL CALCULATED.3IONS-SCNC: 9 MMOL/L (ref 5–15)
AST SERPL-CCNC: 20 U/L (ref 1–40)
BASOPHILS # BLD AUTO: 0.06 10*3/MM3 (ref 0–0.2)
BASOPHILS NFR BLD AUTO: 0.8 % (ref 0–1.5)
BILIRUB SERPL-MCNC: 0.9 MG/DL (ref 0–1.2)
BUN SERPL-MCNC: 18 MG/DL (ref 8–23)
BUN/CREAT SERPL: 15.9 (ref 7–25)
CALCIUM SPEC-SCNC: 9.3 MG/DL (ref 8.6–10.5)
CHLORIDE SERPL-SCNC: 98 MMOL/L (ref 98–107)
CO2 SERPL-SCNC: 32 MMOL/L (ref 22–29)
CREAT BLDA-MCNC: 1.1 MG/DL (ref 0.6–1.3)
CREAT SERPL-MCNC: 1.13 MG/DL (ref 0.76–1.27)
DEPRECATED RDW RBC AUTO: 47.5 FL (ref 37–54)
EGFRCR SERPLBLD CKD-EPI 2021: 73.9 ML/MIN/1.73
EOSINOPHIL # BLD AUTO: 0.19 10*3/MM3 (ref 0–0.4)
EOSINOPHIL NFR BLD AUTO: 2.4 % (ref 0.3–6.2)
ERYTHROCYTE [DISTWIDTH] IN BLOOD BY AUTOMATED COUNT: 14.6 % (ref 12.3–15.4)
GLOBULIN UR ELPH-MCNC: 2.7 GM/DL
GLUCOSE SERPL-MCNC: 179 MG/DL (ref 65–99)
HCT VFR BLD AUTO: 41.6 % (ref 37.5–51)
HGB BLD-MCNC: 14.3 G/DL (ref 13–17.7)
IMM GRANULOCYTES # BLD AUTO: 0.05 10*3/MM3 (ref 0–0.05)
IMM GRANULOCYTES NFR BLD AUTO: 0.6 % (ref 0–0.5)
LYMPHOCYTES # BLD AUTO: 1.09 10*3/MM3 (ref 0.7–3.1)
LYMPHOCYTES NFR BLD AUTO: 14 % (ref 19.6–45.3)
MCH RBC QN AUTO: 30.9 PG (ref 26.6–33)
MCHC RBC AUTO-ENTMCNC: 34.4 G/DL (ref 31.5–35.7)
MCV RBC AUTO: 89.8 FL (ref 79–97)
MONOCYTES # BLD AUTO: 0.67 10*3/MM3 (ref 0.1–0.9)
MONOCYTES NFR BLD AUTO: 8.6 % (ref 5–12)
NEUTROPHILS NFR BLD AUTO: 5.73 10*3/MM3 (ref 1.7–7)
NEUTROPHILS NFR BLD AUTO: 73.6 % (ref 42.7–76)
NRBC BLD AUTO-RTO: 0 /100 WBC (ref 0–0.2)
PLATELET # BLD AUTO: 151 10*3/MM3 (ref 140–450)
PMV BLD AUTO: 11.6 FL (ref 6–12)
POTASSIUM SERPL-SCNC: 4 MMOL/L (ref 3.5–5.2)
PROT SERPL-MCNC: 7.1 G/DL (ref 6–8.5)
RBC # BLD AUTO: 4.63 10*6/MM3 (ref 4.14–5.8)
SODIUM SERPL-SCNC: 139 MMOL/L (ref 136–145)
WBC NRBC COR # BLD: 7.79 10*3/MM3 (ref 3.4–10.8)

## 2022-06-02 PROCEDURE — 80053 COMPREHEN METABOLIC PANEL: CPT

## 2022-06-02 PROCEDURE — 71260 CT THORAX DX C+: CPT

## 2022-06-02 PROCEDURE — 82565 ASSAY OF CREATININE: CPT

## 2022-06-02 PROCEDURE — 25010000002 IOPAMIDOL 61 % SOLUTION: Performed by: INTERNAL MEDICINE

## 2022-06-02 PROCEDURE — 36415 COLL VENOUS BLD VENIPUNCTURE: CPT

## 2022-06-02 PROCEDURE — 74177 CT ABD & PELVIS W/CONTRAST: CPT

## 2022-06-02 PROCEDURE — 85025 COMPLETE CBC W/AUTO DIFF WBC: CPT

## 2022-06-02 RX ADMIN — IOPAMIDOL 100 ML: 612 INJECTION, SOLUTION INTRAVENOUS at 09:11

## 2022-06-03 ENCOUNTER — OFFICE VISIT (OUTPATIENT)
Dept: ONCOLOGY | Facility: CLINIC | Age: 62
End: 2022-06-03

## 2022-06-03 VITALS
HEIGHT: 71 IN | SYSTOLIC BLOOD PRESSURE: 158 MMHG | RESPIRATION RATE: 18 BRPM | WEIGHT: 283 LBS | HEART RATE: 86 BPM | OXYGEN SATURATION: 98 % | TEMPERATURE: 97.5 F | BODY MASS INDEX: 39.62 KG/M2 | DIASTOLIC BLOOD PRESSURE: 77 MMHG

## 2022-06-03 DIAGNOSIS — C4A.9 MERKEL CELL CANCER: Primary | ICD-10-CM

## 2022-06-03 PROCEDURE — 99213 OFFICE O/P EST LOW 20 MIN: CPT | Performed by: NURSE PRACTITIONER

## 2022-06-03 RX ORDER — KETOROLAC TROMETHAMINE 5 MG/ML
SOLUTION OPHTHALMIC
COMMUNITY

## 2022-06-03 RX ORDER — ICOSAPENT ETHYL 1000 MG/1
CAPSULE ORAL
COMMUNITY

## 2022-06-03 NOTE — PROGRESS NOTES
DATE OF VISIT: 6/3/2022    REASON FOR VISIT: Followup for Merkel cell carcinoma    PROBLEM LIST:  1. Merkel cell carcinoma [id number]  A. Presented with left groin mass.   B. Status post left groin excision of 5 cm mass.   C. Final pathology revealed Merkel cell carcinoma.   D. PET scan for clinical staging done 1/17/2018 failed to show evidence of metastatic disease.   E. Started adjuvant chemotherapy with cisplatin and etoposide February 13, 2018, status post 4 cycles, completed April 20, 2018.   F.  Completed adjuvant radiation to the left groin.  2. History of basal cell carcinoma of the skin.   3. Type 2 diabetes.   4. Hypertension.  5. Lymphedema to left leg     HISTORY OF PRESENT ILLNESS: The patient is a very pleasant 61 y.o. 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  Whole body PET scan done on January 17, 2018 did not show any evidence of metastatic disease.  The patient was started on adjuvant chemotherapy with cisplatin and etoposide February 13, 2018.  Completed 4 cycles on April 20, 2018 The patient is here today for scheduled follow-up visit.    SUBJECTIVE: The patient is here today by himself. He has been feeling well since his last visit. He is staying active and has had no significant changes in health history. He is still seeing dermatology yearly for skin exams. He denies new masses, lesions, adenopathy, fever, chills, night sweats or unexplained weight loss.     Past History:  Medical History: has a past medical history of Ankylosing spondylitis lumbar region (HCC), Diabetes mellitus (HCC), Disease of thyroid gland, Hypertension, Merkel cell cancer (HCC), and Sleep apnea.   Surgical History: has a past surgical history that includes Cataract extraction (Bilateral, 2014); Excessive thigh / hip / buttock / flank skin excision (12/16/2017); Colonoscopy (2015); and Other surgical history.   Family History: family history includes Aortic stenosis  "in his mother; Bone cancer in his paternal uncle; Breast cancer in his sister; Colon cancer in his maternal grandfather; Diabetes in his mother; Heart attack in his father; Lung cancer in his paternal uncle; Melanoma in his father.   Social History: reports that he quit smoking about 17 years ago. His smoking use included cigarettes. He smoked 2.00 packs per day. He has never used smokeless tobacco. He reports current alcohol use of about 2.0 standard drinks of alcohol per week. He reports that he does not use drugs.    (Not in a hospital admission)     Allergies: Patient has no known allergies.      Review of Systems   Musculoskeletal: Positive for arthralgias.   All other systems reviewed and are negative.      PHYSICAL EXAMINATION:   /77   Pulse 86   Temp 97.5 °F (36.4 °C) (Temporal)   Resp 18   Ht 180.3 cm (70.98\")   Wt 128 kg (283 lb)   SpO2 98%   BMI 39.49 kg/m²    Pain Score    06/03/22 1030   PainSc: 0-No pain        ECOG Performance Status: 1 - Symptomatic but completely ambulatory  General Appearance:  alert, cooperative, no apparent distress and appears stated age   Lungs:   Clear to auscultation bilaterally; respirations regular, even, and unlabored bilaterally   Heart:  Regular rate and rhythm, no murmurs appreciated   Abdomen:   Soft, non-tender, non-distended and no organomegaly     Hospital Outpatient Visit on 06/02/2022   Component Date Value Ref Range Status   • Creatinine 06/02/2022 1.10  0.60 - 1.30 mg/dL Final    Serial Number: [id number]:  [id number]   Lab on 06/02/2022   Component Date Value Ref Range Status   • Glucose 06/02/2022 179 (A) 65 - 99 mg/dL Final   • BUN 06/02/2022 18  8 - 23 mg/dL Final   • Creatinine 06/02/2022 1.13  0.76 - 1.27 mg/dL Final   • Sodium 06/02/2022 139  136 - 145 mmol/L Final   • Potassium 06/02/2022 4.0  3.5 - 5.2 mmol/L Final   • Chloride 06/02/2022 98  98 - 107 mmol/L Final   • CO2 06/02/2022 32.0 (A) 22.0 - 29.0 mmol/L Final   • Calcium 06/02/2022 " 9.3  8.6 - 10.5 mg/dL Final   • Total Protein 06/02/2022 7.1  6.0 - 8.5 g/dL Final   • Albumin 06/02/2022 4.40  3.50 - 5.20 g/dL Final   • ALT (SGPT) 06/02/2022 24  1 - 41 U/L Final   • AST (SGOT) 06/02/2022 20  1 - 40 U/L Final   • Alkaline Phosphatase 06/02/2022 69  39 - 117 U/L Final   • Total Bilirubin 06/02/2022 0.9  0.0 - 1.2 mg/dL Final   • Globulin 06/02/2022 2.7  gm/dL Final    Calculated Result   • A/G Ratio 06/02/2022 1.6  g/dL Final   • BUN/Creatinine Ratio 06/02/2022 15.9  7.0 - 25.0 Final   • Anion Gap 06/02/2022 9.0  5.0 - 15.0 mmol/L Final   • eGFR 06/02/2022 73.9  >60.0 mL/min/1.73 Final    National Kidney Foundation and American Society of Nephrology (ASN) Task Force recommended calculation based on the Chronic Kidney Disease Epidemiology Collaboration (CKD-EPI) equation refit without adjustment for race.   • WBC 06/02/2022 7.79  3.40 - 10.80 10*3/mm3 Final   • RBC 06/02/2022 4.63  4.14 - 5.80 10*6/mm3 Final   • Hemoglobin 06/02/2022 14.3  13.0 - 17.7 g/dL Final   • Hematocrit 06/02/2022 41.6  37.5 - 51.0 % Final   • MCV 06/02/2022 89.8  79.0 - 97.0 fL Final   • MCH 06/02/2022 30.9  26.6 - 33.0 pg Final   • MCHC 06/02/2022 34.4  31.5 - 35.7 g/dL Final   • RDW 06/02/2022 14.6  12.3 - 15.4 % Final   • RDW-SD 06/02/2022 47.5  37.0 - 54.0 fl Final   • MPV 06/02/2022 11.6  6.0 - 12.0 fL Final   • Platelets 06/02/2022 151  140 - 450 10*3/mm3 Final   • Neutrophil % 06/02/2022 73.6  42.7 - 76.0 % Final   • Lymphocyte % 06/02/2022 14.0 (A) 19.6 - 45.3 % Final   • Monocyte % 06/02/2022 8.6  5.0 - 12.0 % Final   • Eosinophil % 06/02/2022 2.4  0.3 - 6.2 % Final   • Basophil % 06/02/2022 0.8  0.0 - 1.5 % Final   • Immature Grans % 06/02/2022 0.6 (A) 0.0 - 0.5 % Final   • Neutrophils, Absolute 06/02/2022 5.73  1.70 - 7.00 10*3/mm3 Final   • Lymphocytes, Absolute 06/02/2022 1.09  0.70 - 3.10 10*3/mm3 Final   • Monocytes, Absolute 06/02/2022 0.67  0.10 - 0.90 10*3/mm3 Final   • Eosinophils, Absolute 06/02/2022  0.19  0.00 - 0.40 10*3/mm3 Final   • Basophils, Absolute 06/02/2022 0.06  0.00 - 0.20 10*3/mm3 Final   • Immature Grans, Absolute 06/02/2022 0.05  0.00 - 0.05 10*3/mm3 Final   • nRBC 06/02/2022 0.0  0.0 - 0.2 /100 WBC Final      CT Chest With Contrast Diagnostic, CT Abdomen Pelvis With Contrast    Result Date: 6/2/2022  Narrative: DATE OF EXAM: 6/2/2022 8:53 AM  PROCEDURE: CT ABDOMEN PELVIS W CONTRAST-, CT CHEST W CONTRAST DIAGNOSTIC-  INDICATIONS: History of Merkel cell carcinoma presenting as left groin mass status post left groin excision, without evidence of metastasis at time of diagnosis. Status post adjuvant chemotherapy and adjuvant radiation completed in 2018. Surveillance scan.  COMPARISON: CT chest abdomen and pelvis 6/3/2021  TECHNIQUE: Routine transaxial slices were obtained through the chest, abdomen, and pelvis after the intravenous administration of 100 mL of Isovue 300. Reconstructed coronal and sagittal images were also obtained. Automated exposure control and iterative construction methods were used.  The radiation dose reduction device was turned on for each scan per the ALARA (As Low as Reasonably Achievable) protocol.  FINDINGS:  Chest: Stable size and appearance of bulky/enlarged and heterogeneous thyroid gland. No thoracic adenopathy or new/suspicious lymph nodes in the chest. Redemonstration of scattered coronary artery calcifications. Heart size appears within normal limits. No pericardial effusion. Normal appearance of the pulmonary arteries which appear well opacified without filling defect to suggest pulmonary embolism. Similar mild atherosclerosis of the thoracic aorta which appears normal in caliber. Unremarkable appearance of the thoracic esophagus. Redemonstration of bilateral gynecomastia with otherwise unremarkable appearance of the chest wall soft tissues. Trace linear secretions are seen within the trachea. The trachea and mainstem bronchi appear grossly patent. Unremarkable  appearance of the smaller peripheral airways. The lungs are clear without focal consolidation or evidence of active infectious or inflammatory process. No lung mass or suspicious pulmonary nodule. Redemonstration of subcentimeter calcified granuloma in left upper lobe. No pleural effusion or pneumothorax. No acute or suspicious bony findings. Redemonstration of thin anterior vertebral body syndesmophytes and ossification of the supraspinous ligament compatible with history of ankylosing spondylitis.  Abdomen and pelvis: Diffuse hypoattenuation of the liver parenchyma compatible with hepatic steatosis. No focal hepatic lesion. Unremarkable appearance of the gallbladder and bile ducts. Unremarkable appearance of the spleen and pancreas. Stable appearance of nodular thickening of the left adrenal gland and nodular thickening and fat-containing subcentimeter nodules of the right adrenal gland compatible with adrenal myelolipomas. Unremarkable appearance of the kidneys, ureters, bladder, prostate, and seminal vesicles. Minimal sigmoid colonic diverticulosis without diverticulitis. Incidental note of small duodenal lipoma. Otherwise unremarkable appearance of the GI tract. No free abdominopelvic fluid or conspicuous abdominopelvic fat stranding. No evidence of pneumoperitoneum. No bulky or suspicious appearing abdominopelvic lymph nodes. Redemonstration of scattered surgical clips in the region of the left groin from prior excision. Stable appearance of a tiny fat-containing umbilical hernia with subcentimeter calcification, possibly old fat necrosis. Otherwise unremarkable appearance of the body wall soft tissues. There are scattered atherosclerotic calcifications with otherwise unremarkable appearance of the vasculature. No acute or suspicious bony findings. Lumbar spine changes and fusion of the sacroiliac joints compatible with ankylosing spondylitis.      Impression: Grossly stable appearance of the chest abdomen and  pelvis without evidence of residual or recurrent disease.  This report was finalized on 6/2/2022 10:02 AM by Cecil Winters MD.    (  No results found.    ASSESSMENT: The patient is a very pleasant 61 y.o. male  with Merkel cell carcinoma    PLAN:  1. Merkel cell carcinoma:  A.  I reviewed the CAT scan results with the patient. I reviewed the images myself. I reassured the patient he has no evidence of new or relapsed disease.    B. I reviewed the lab results from yesterday with the patient. His blood counts are within normal limits. His creatinine was up slightly to 1.13, however still normal. His blood sugar was 179, consistent with his known diabetes. His liver enzymes were normal as well as electrolytes.   C. He will continue follow up with dermatology for routine skin exams. He is being followed by Dr. Doe every year.   D. We will see him back in 1 year with repeat labs and imaging studies ordered prior to return.     2. Type 2 diabetes:  A. He will continue Toujeo, Novolog, Metformin, and Trulicity for management.     3. Hypertension:  A. He will continue Lotrel and ramipril for hypertension. This is being managed by his PCP.        FOLLOW UP:1 year with scans and labs    RITA Quintanilla  6/3/2022

## 2023-05-05 DIAGNOSIS — C4A.9 MERKEL CELL CANCER: Primary | ICD-10-CM

## 2023-06-08 ENCOUNTER — HOSPITAL ENCOUNTER (OUTPATIENT)
Dept: CT IMAGING | Facility: HOSPITAL | Age: 63
Discharge: HOME | End: 2023-06-08
Payer: COMMERCIAL

## 2023-06-08 ENCOUNTER — LAB (OUTPATIENT)
Dept: LAB | Facility: HOSPITAL | Age: 63
End: 2023-06-08
Payer: COMMERCIAL

## 2023-06-08 DIAGNOSIS — C4A.9 MERKEL CELL CANCER: ICD-10-CM

## 2023-06-08 DIAGNOSIS — C4A.9 MERKEL CELL CANCER: Primary | ICD-10-CM

## 2023-06-08 LAB
ALBUMIN SERPL-MCNC: 4.2 G/DL (ref 3.5–5.2)
ALBUMIN/GLOB SERPL: 1.6 G/DL
ALP SERPL-CCNC: 71 U/L (ref 39–117)
ALT SERPL W P-5'-P-CCNC: 17 U/L (ref 1–41)
ANION GAP SERPL CALCULATED.3IONS-SCNC: 11 MMOL/L (ref 5–15)
AST SERPL-CCNC: 15 U/L (ref 1–40)
BASOPHILS # BLD AUTO: 0.06 10*3/MM3 (ref 0–0.2)
BASOPHILS NFR BLD AUTO: 0.7 % (ref 0–1.5)
BILIRUB SERPL-MCNC: 0.8 MG/DL (ref 0–1.2)
BUN SERPL-MCNC: 13 MG/DL (ref 8–23)
BUN/CREAT SERPL: 13.8 (ref 7–25)
CALCIUM SPEC-SCNC: 9.2 MG/DL (ref 8.6–10.5)
CHLORIDE SERPL-SCNC: 101 MMOL/L (ref 98–107)
CO2 SERPL-SCNC: 29 MMOL/L (ref 22–29)
CREAT BLDA-MCNC: 1 MG/DL (ref 0.6–1.3)
CREAT SERPL-MCNC: 0.94 MG/DL (ref 0.76–1.27)
DEPRECATED RDW RBC AUTO: 46 FL (ref 37–54)
EGFRCR SERPLBLD CKD-EPI 2021: 91.7 ML/MIN/1.73
EOSINOPHIL # BLD AUTO: 0.21 10*3/MM3 (ref 0–0.4)
EOSINOPHIL NFR BLD AUTO: 2.5 % (ref 0.3–6.2)
ERYTHROCYTE [DISTWIDTH] IN BLOOD BY AUTOMATED COUNT: 14.2 % (ref 12.3–15.4)
GLOBULIN UR ELPH-MCNC: 2.7 GM/DL
GLUCOSE SERPL-MCNC: 87 MG/DL (ref 65–99)
HCT VFR BLD AUTO: 43.1 % (ref 37.5–51)
HGB BLD-MCNC: 14.6 G/DL (ref 13–17.7)
IMM GRANULOCYTES # BLD AUTO: 0.02 10*3/MM3 (ref 0–0.05)
IMM GRANULOCYTES NFR BLD AUTO: 0.2 % (ref 0–0.5)
LYMPHOCYTES # BLD AUTO: 1.04 10*3/MM3 (ref 0.7–3.1)
LYMPHOCYTES NFR BLD AUTO: 12.6 % (ref 19.6–45.3)
MCH RBC QN AUTO: 30.3 PG (ref 26.6–33)
MCHC RBC AUTO-ENTMCNC: 33.9 G/DL (ref 31.5–35.7)
MCV RBC AUTO: 89.4 FL (ref 79–97)
MONOCYTES # BLD AUTO: 0.72 10*3/MM3 (ref 0.1–0.9)
MONOCYTES NFR BLD AUTO: 8.7 % (ref 5–12)
NEUTROPHILS NFR BLD AUTO: 6.19 10*3/MM3 (ref 1.7–7)
NEUTROPHILS NFR BLD AUTO: 75.3 % (ref 42.7–76)
NRBC BLD AUTO-RTO: 0 /100 WBC (ref 0–0.2)
PLATELET # BLD AUTO: 151 10*3/MM3 (ref 140–450)
PMV BLD AUTO: 11.2 FL (ref 6–12)
POTASSIUM SERPL-SCNC: 4.1 MMOL/L (ref 3.5–5.2)
PROT SERPL-MCNC: 6.9 G/DL (ref 6–8.5)
RBC # BLD AUTO: 4.82 10*6/MM3 (ref 4.14–5.8)
SODIUM SERPL-SCNC: 141 MMOL/L (ref 136–145)
WBC NRBC COR # BLD: 8.24 10*3/MM3 (ref 3.4–10.8)

## 2023-06-08 PROCEDURE — 80053 COMPREHEN METABOLIC PANEL: CPT

## 2023-06-08 PROCEDURE — 36415 COLL VENOUS BLD VENIPUNCTURE: CPT

## 2023-06-08 PROCEDURE — 85025 COMPLETE CBC W/AUTO DIFF WBC: CPT

## 2023-06-08 PROCEDURE — 74177 CT ABD & PELVIS W/CONTRAST: CPT

## 2023-06-08 PROCEDURE — 82565 ASSAY OF CREATININE: CPT

## 2023-06-08 PROCEDURE — 71260 CT THORAX DX C+: CPT

## 2023-06-08 PROCEDURE — 25510000001 IOPAMIDOL 61 % SOLUTION: Performed by: NURSE PRACTITIONER

## 2023-06-08 RX ADMIN — IOPAMIDOL 85 ML: 612 INJECTION, SOLUTION INTRAVENOUS at 15:14

## 2023-06-09 ENCOUNTER — OFFICE VISIT (OUTPATIENT)
Dept: ONCOLOGY | Facility: CLINIC | Age: 63
End: 2023-06-09
Payer: COMMERCIAL

## 2023-06-09 VITALS
HEIGHT: 71 IN | SYSTOLIC BLOOD PRESSURE: 147 MMHG | OXYGEN SATURATION: 96 % | RESPIRATION RATE: 18 BRPM | HEART RATE: 85 BPM | DIASTOLIC BLOOD PRESSURE: 83 MMHG | BODY MASS INDEX: 36.54 KG/M2 | WEIGHT: 261 LBS | TEMPERATURE: 97.3 F

## 2023-06-09 DIAGNOSIS — C4A.9 MERKEL CELL CANCER: Primary | ICD-10-CM

## 2023-06-09 PROCEDURE — 99214 OFFICE O/P EST MOD 30 MIN: CPT | Performed by: INTERNAL MEDICINE

## 2023-06-09 RX ORDER — DEXTROMETHORPHAN HYDROBROMIDE AND PROMETHAZINE HYDROCHLORIDE 15; 6.25 MG/5ML; MG/5ML
SYRUP ORAL
COMMUNITY

## 2023-06-09 RX ORDER — (INSULIN DEGLUDEC AND LIRAGLUTIDE) 100; 3.6 [IU]/ML; MG/ML
INJECTION, SOLUTION SUBCUTANEOUS
COMMUNITY

## 2023-06-09 NOTE — PROGRESS NOTES
DATE OF VISIT: 6/9/2023    REASON FOR VISIT: Followup for Merkel cell carcinoma    PROBLEM LIST:  1. Merkel cell carcinoma TxN2M0  A. Presented with left groin mass.   B. Status post left groin excision of 5 cm mass.   C. Final pathology revealed Merkel cell carcinoma.   D. PET scan for clinical staging done 1/17/2018 failed to show evidence of metastatic disease.   E. Started adjuvant chemotherapy with cisplatin and etoposide February 13, 2018, status post 4 cycles, completed April 20, 2018.   F.  Completed adjuvant radiation to the left groin.  2. History of basal cell carcinoma of the skin.   3. Type 2 diabetes.   4. Hypertension.  5. Lymphedema to left leg     HISTORY OF PRESENT ILLNESS: The patient is a very pleasant 62 y.o. 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  Whole body PET scan done on January 17, 2018 did not show any evidence of metastatic disease.  The patient was started on adjuvant chemotherapy with cisplatin and etoposide February 13, 2018.  Completed 4 cycles on April 20, 2018 The patient is here today for scheduled follow-up visit.    SUBJECTIVE: Regino is here today by himself.  He is staying active.  He denies any fever chills or night sweats.  He is playing golf 4 times a week.    Past History:  Medical History: has a past medical history of Ankylosing spondylitis lumbar region, Diabetes mellitus, Disease of thyroid gland, Hypertension, Merkel cell cancer, and Sleep apnea.   Surgical History: has a past surgical history that includes Cataract extraction (Bilateral, 2014); Excessive thigh / hip / buttock / flank skin excision (12/16/2017); Colonoscopy (2015); and Other surgical history.   Family History: family history includes Aortic stenosis in his mother; Bone cancer in his paternal uncle; Breast cancer in his sister; Colon cancer in his maternal grandfather; Diabetes in his mother; Heart attack in his father; Lung cancer in his  "paternal uncle; Melanoma in his father.   Social History: reports that he quit smoking about 18 years ago. His smoking use included cigarettes. He smoked an average of 2 packs per day. He has never used smokeless tobacco. He reports current alcohol use of about 2.0 standard drinks per week. He reports that he does not use drugs.    (Not in a hospital admission)     Allergies: Patient has no known allergies.      Review of Systems   Constitutional: Negative.    HENT: Negative.     Cardiovascular: Negative.    Gastrointestinal: Negative.    Musculoskeletal:  Positive for arthralgias.     PHYSICAL EXAMINATION:   Ht 179.1 cm (70.5\")   BMI 40.03 kg/m²    There were no vitals filed for this visit.       ECOG Performance Status: 1 - Symptomatic but completely ambulatory  General Appearance:  alert, cooperative, no apparent distress and appears stated age   Lungs:   Clear to auscultation bilaterally; respirations regular, even, and unlabored bilaterally   Heart:  Regular rate and rhythm, no murmurs appreciated   Abdomen:   Soft, non-tender, non-distended, and no organomegaly     Hospital Outpatient Visit on 06/08/2023   Component Date Value Ref Range Status    Creatinine 06/08/2023 1.00  0.60 - 1.30 mg/dL Final    Serial Number: [id number]:  [id number]   Lab on 06/08/2023   Component Date Value Ref Range Status    Glucose 06/08/2023 87  65 - 99 mg/dL Final    BUN 06/08/2023 13  8 - 23 mg/dL Final    Creatinine 06/08/2023 0.94  0.76 - 1.27 mg/dL Final    Sodium 06/08/2023 141  136 - 145 mmol/L Final    Potassium 06/08/2023 4.1  3.5 - 5.2 mmol/L Final    Chloride 06/08/2023 101  98 - 107 mmol/L Final    CO2 06/08/2023 29.0  22.0 - 29.0 mmol/L Final    Calcium 06/08/2023 9.2  8.6 - 10.5 mg/dL Final    Total Protein 06/08/2023 6.9  6.0 - 8.5 g/dL Final    Albumin 06/08/2023 4.2  3.5 - 5.2 g/dL Final    ALT (SGPT) 06/08/2023 17  1 - 41 U/L Final    AST (SGOT) 06/08/2023 15  1 - 40 U/L Final    Alkaline Phosphatase 06/08/2023 71 "  39 - 117 U/L Final    Total Bilirubin 06/08/2023 0.8  0.0 - 1.2 mg/dL Final    Globulin 06/08/2023 2.7  gm/dL Final    Calculated Result    A/G Ratio 06/08/2023 1.6  g/dL Final    BUN/Creatinine Ratio 06/08/2023 13.8  7.0 - 25.0 Final    Anion Gap 06/08/2023 11.0  5.0 - 15.0 mmol/L Final    eGFR 06/08/2023 91.7  >60.0 mL/min/1.73 Final    WBC 06/08/2023 8.24  3.40 - 10.80 10*3/mm3 Final    RBC 06/08/2023 4.82  4.14 - 5.80 10*6/mm3 Final    Hemoglobin 06/08/2023 14.6  13.0 - 17.7 g/dL Final    Hematocrit 06/08/2023 43.1  37.5 - 51.0 % Final    MCV 06/08/2023 89.4  79.0 - 97.0 fL Final    MCH 06/08/2023 30.3  26.6 - 33.0 pg Final    MCHC 06/08/2023 33.9  31.5 - 35.7 g/dL Final    RDW 06/08/2023 14.2  12.3 - 15.4 % Final    RDW-SD 06/08/2023 46.0  37.0 - 54.0 fl Final    MPV 06/08/2023 11.2  6.0 - 12.0 fL Final    Platelets 06/08/2023 151  140 - 450 10*3/mm3 Final    Neutrophil % 06/08/2023 75.3  42.7 - 76.0 % Final    Lymphocyte % 06/08/2023 12.6 (L)  19.6 - 45.3 % Final    Monocyte % 06/08/2023 8.7  5.0 - 12.0 % Final    Eosinophil % 06/08/2023 2.5  0.3 - 6.2 % Final    Basophil % 06/08/2023 0.7  0.0 - 1.5 % Final    Immature Grans % 06/08/2023 0.2  0.0 - 0.5 % Final    Neutrophils, Absolute 06/08/2023 6.19  1.70 - 7.00 10*3/mm3 Final    Lymphocytes, Absolute 06/08/2023 1.04  0.70 - 3.10 10*3/mm3 Final    Monocytes, Absolute 06/08/2023 0.72  0.10 - 0.90 10*3/mm3 Final    Eosinophils, Absolute 06/08/2023 0.21  0.00 - 0.40 10*3/mm3 Final    Basophils, Absolute 06/08/2023 0.06  0.00 - 0.20 10*3/mm3 Final    Immature Grans, Absolute 06/08/2023 0.02  0.00 - 0.05 10*3/mm3 Final    nRBC 06/08/2023 0.0  0.0 - 0.2 /100 WBC Final      No results found.  (  No results found.    ASSESSMENT: The patient is a very pleasant 62 y.o. male  with Merkel cell carcinoma    PLAN:  1. Merkel cell carcinoma:  A.  I did go over the scan results with the patient from June 8, 2023.  There has not been officially read but I reviewed the films  myself and I did not see any evidence of relapsed disease.  B.  I did go over the blood results with the patient from June 8, 2023 and reassured him no evidence of abnormalities.  CBC looked normal as well as a CMP.  C. He will continue follow up with dermatology for routine skin exams. He is being followed by Dr. Doe every year.   D. We will see him back in 1 year with repeat labs and imaging studies ordered prior to return.     2. Type 2 diabetes:  A. He will continue Toujeo, Novolog, Metformin, and Trulicity for management.     3. Hypertension:  A. He will continue Lotrel and ramipril for hypertension. This is being managed by his PCP.        FOLLOW UP:1 year with scans and labs    Anastasia Waller MD  6/9/2023

## 2024-06-06 ENCOUNTER — HOSPITAL ENCOUNTER (OUTPATIENT)
Dept: CT IMAGING | Facility: HOSPITAL | Age: 64
Discharge: HOME | End: 2024-06-06
Payer: COMMERCIAL

## 2024-06-06 ENCOUNTER — LAB (OUTPATIENT)
Dept: LAB | Facility: HOSPITAL | Age: 64
End: 2024-06-06
Payer: COMMERCIAL

## 2024-06-06 DIAGNOSIS — C4A.9 MERKEL CELL CANCER: ICD-10-CM

## 2024-06-06 LAB
ALBUMIN SERPL-MCNC: 4.5 G/DL (ref 3.5–5.2)
ALBUMIN/GLOB SERPL: 1.8 G/DL
ALP SERPL-CCNC: 67 U/L (ref 39–117)
ALT SERPL W P-5'-P-CCNC: 20 U/L (ref 1–41)
ANION GAP SERPL CALCULATED.3IONS-SCNC: 9 MMOL/L (ref 5–15)
AST SERPL-CCNC: 16 U/L (ref 1–40)
BASOPHILS # BLD AUTO: 0.07 10*3/MM3 (ref 0–0.2)
BASOPHILS NFR BLD AUTO: 0.9 % (ref 0–1.5)
BILIRUB SERPL-MCNC: 0.8 MG/DL (ref 0–1.2)
BUN SERPL-MCNC: 20 MG/DL (ref 8–23)
BUN/CREAT SERPL: 19.2 (ref 7–25)
CALCIUM SPEC-SCNC: 9.1 MG/DL (ref 8.6–10.5)
CHLORIDE SERPL-SCNC: 100 MMOL/L (ref 98–107)
CO2 SERPL-SCNC: 32 MMOL/L (ref 22–29)
CREAT SERPL-MCNC: 1.04 MG/DL (ref 0.76–1.27)
DEPRECATED RDW RBC AUTO: 47.8 FL (ref 37–54)
EGFRCR SERPLBLD CKD-EPI 2021: 80.7 ML/MIN/1.73
EOSINOPHIL # BLD AUTO: 0.17 10*3/MM3 (ref 0–0.4)
EOSINOPHIL NFR BLD AUTO: 2.2 % (ref 0.3–6.2)
ERYTHROCYTE [DISTWIDTH] IN BLOOD BY AUTOMATED COUNT: 14.3 % (ref 12.3–15.4)
GLOBULIN UR ELPH-MCNC: 2.5 GM/DL
GLUCOSE SERPL-MCNC: 119 MG/DL (ref 65–99)
HCT VFR BLD AUTO: 42.6 % (ref 37.5–51)
HGB BLD-MCNC: 14.3 G/DL (ref 13–17.7)
IMM GRANULOCYTES # BLD AUTO: 0.08 10*3/MM3 (ref 0–0.05)
IMM GRANULOCYTES NFR BLD AUTO: 1 % (ref 0–0.5)
LYMPHOCYTES # BLD AUTO: 1.14 10*3/MM3 (ref 0.7–3.1)
LYMPHOCYTES NFR BLD AUTO: 14.9 % (ref 19.6–45.3)
MCH RBC QN AUTO: 30.6 PG (ref 26.6–33)
MCHC RBC AUTO-ENTMCNC: 33.6 G/DL (ref 31.5–35.7)
MCV RBC AUTO: 91.2 FL (ref 79–97)
MONOCYTES # BLD AUTO: 0.58 10*3/MM3 (ref 0.1–0.9)
MONOCYTES NFR BLD AUTO: 7.6 % (ref 5–12)
NEUTROPHILS NFR BLD AUTO: 5.61 10*3/MM3 (ref 1.7–7)
NEUTROPHILS NFR BLD AUTO: 73.4 % (ref 42.7–76)
NRBC BLD AUTO-RTO: 0 /100 WBC (ref 0–0.2)
PLATELET # BLD AUTO: 144 10*3/MM3 (ref 140–450)
PMV BLD AUTO: 11.5 FL (ref 6–12)
POTASSIUM SERPL-SCNC: 4.2 MMOL/L (ref 3.5–5.2)
PROT SERPL-MCNC: 7 G/DL (ref 6–8.5)
RBC # BLD AUTO: 4.67 10*6/MM3 (ref 4.14–5.8)
SODIUM SERPL-SCNC: 141 MMOL/L (ref 136–145)
WBC NRBC COR # BLD AUTO: 7.65 10*3/MM3 (ref 3.4–10.8)

## 2024-06-06 PROCEDURE — 85025 COMPLETE CBC W/AUTO DIFF WBC: CPT

## 2024-06-06 PROCEDURE — 74177 CT ABD & PELVIS W/CONTRAST: CPT

## 2024-06-06 PROCEDURE — 36415 COLL VENOUS BLD VENIPUNCTURE: CPT

## 2024-06-06 PROCEDURE — 80053 COMPREHEN METABOLIC PANEL: CPT

## 2024-06-06 PROCEDURE — 71260 CT THORAX DX C+: CPT

## 2024-06-06 PROCEDURE — 25510000001 IOPAMIDOL 61 % SOLUTION: Performed by: INTERNAL MEDICINE

## 2024-06-06 RX ADMIN — IOPAMIDOL 95 ML: 612 INJECTION, SOLUTION INTRAVENOUS at 09:10

## 2024-06-07 ENCOUNTER — TELEPHONE (OUTPATIENT)
Age: 64
End: 2024-06-07

## 2024-06-07 ENCOUNTER — OFFICE VISIT (OUTPATIENT)
Age: 64
End: 2024-06-07
Payer: COMMERCIAL

## 2024-06-07 VITALS
RESPIRATION RATE: 18 BRPM | HEIGHT: 71 IN | WEIGHT: 268.4 LBS | TEMPERATURE: 97.4 F | HEART RATE: 84 BPM | DIASTOLIC BLOOD PRESSURE: 78 MMHG | BODY MASS INDEX: 37.57 KG/M2 | OXYGEN SATURATION: 98 % | SYSTOLIC BLOOD PRESSURE: 164 MMHG

## 2024-06-07 DIAGNOSIS — C4A.9 MERKEL CELL CANCER: Primary | ICD-10-CM

## 2024-06-07 PROCEDURE — 99214 OFFICE O/P EST MOD 30 MIN: CPT | Performed by: NURSE PRACTITIONER

## 2024-06-07 RX ORDER — LATANOPROST 50 UG/ML
SOLUTION/ DROPS OPHTHALMIC
COMMUNITY
Start: 2024-05-07

## 2024-06-07 RX ORDER — RAMIPRIL 10 MG/1
CAPSULE ORAL
COMMUNITY

## 2024-06-07 NOTE — TELEPHONE ENCOUNTER
Pt notified of official radiology report confirming no evidence of relapsed or progressive disease. We will keep follow up as planned.

## 2024-06-07 NOTE — PROGRESS NOTES
DATE OF VISIT: 6/7/2024    REASON FOR VISIT: Followup for Merkel cell carcinoma    PROBLEM LIST:  1. Merkel cell carcinoma TxN2M0  A. Presented with left groin mass.   B. Status post left groin excision of 5 cm mass.   C. Final pathology revealed Merkel cell carcinoma.   D. PET scan for clinical staging done 1/17/2018 failed to show evidence of metastatic disease.   E. Started adjuvant chemotherapy with cisplatin and etoposide February 13, 2018, status post 4 cycles, completed April 20, 2018.   F.  Completed adjuvant radiation to the left groin.  2. History of basal cell carcinoma of the skin.   3. Type 2 diabetes.   4. Hypertension.  5. Lymphedema to left leg     HISTORY OF PRESENT ILLNESS: The patient is a very pleasant 63 y.o. 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  Whole body PET scan done on January 17, 2018 did not show any evidence of metastatic disease.  The patient was started on adjuvant chemotherapy with cisplatin and etoposide February 13, 2018.  Completed 4 cycles on April 20, 2018 The patient is here today for scheduled follow-up visit.    SUBJECTIVE: The patient is here today by himself. He has been doing well since his last visit with  no changes in health history. He is staying active and feeling good. He denies pelvic pain, unexplained weight loss, cough, or shortness of breath. He states he is up to date on his cancer screening including physical exam with his PCP, colonoscopy, and PSA.     Past History:  Medical History: has a past medical history of Ankylosing spondylitis lumbar region, Diabetes mellitus, Disease of thyroid gland, Hypertension, Merkel cell cancer, and Sleep apnea.   Surgical History: has a past surgical history that includes Cataract extraction (Bilateral, 2014); Excessive thigh / hip / buttock / flank skin excision (12/16/2017); Colonoscopy (2015); and Other surgical history.   Family History: family history includes  "Aortic stenosis in his mother; Bone cancer in his paternal uncle; Breast cancer in his sister; Colon cancer in his maternal grandfather; Diabetes in his mother; Heart attack in his father; Lung cancer in his paternal uncle; Melanoma in his father.   Social History: reports that he quit smoking about 19 years ago. His smoking use included cigarettes. He has never used smokeless tobacco. He reports current alcohol use of about 2.0 standard drinks of alcohol per week. He reports that he does not use drugs.    (Not in a hospital admission)     Allergies: Patient has no known allergies.      Review of Systems   Musculoskeletal:  Positive for arthralgias.       PHYSICAL EXAMINATION:   /78   Pulse 84   Temp 97.4 °F (36.3 °C)   Resp 18   Ht 179.1 cm (70.51\")   Wt 122 kg (268 lb 6.4 oz)   SpO2 98%   BMI 37.95 kg/m²    Pain Score    06/07/24 0932   PainSc: 0-No pain          ECOG Performance Status: 0 - Asymptomatic  General Appearance:  alert, cooperative, no apparent distress and appears stated age   Lungs:   Clear to auscultation bilaterally; respirations regular, even, and unlabored bilaterally   Heart:  Regular rate and rhythm, no murmurs appreciated   Abdomen:   Soft, non-tender, non-distended, and no organomegaly     Lab on 06/06/2024   Component Date Value Ref Range Status    Glucose 06/06/2024 119 (H)  65 - 99 mg/dL Final    BUN 06/06/2024 20  8 - 23 mg/dL Final    Creatinine 06/06/2024 1.04  0.76 - 1.27 mg/dL Final    Sodium 06/06/2024 141  136 - 145 mmol/L Final    Potassium 06/06/2024 4.2  3.5 - 5.2 mmol/L Final    Chloride 06/06/2024 100  98 - 107 mmol/L Final    CO2 06/06/2024 32.0 (H)  22.0 - 29.0 mmol/L Final    Calcium 06/06/2024 9.1  8.6 - 10.5 mg/dL Final    Total Protein 06/06/2024 7.0  6.0 - 8.5 g/dL Final    Albumin 06/06/2024 4.5  3.5 - 5.2 g/dL Final    ALT (SGPT) 06/06/2024 20  1 - 41 U/L Final    AST (SGOT) 06/06/2024 16  1 - 40 U/L Final    Alkaline Phosphatase 06/06/2024 67  39 - 117 " U/L Final    Total Bilirubin 06/06/2024 0.8  0.0 - 1.2 mg/dL Final    Globulin 06/06/2024 2.5  gm/dL Final    Calculated Result    A/G Ratio 06/06/2024 1.8  g/dL Final    BUN/Creatinine Ratio 06/06/2024 19.2  7.0 - 25.0 Final    Anion Gap 06/06/2024 9.0  5.0 - 15.0 mmol/L Final    eGFR 06/06/2024 80.7  >60.0 mL/min/1.73 Final    WBC 06/06/2024 7.65  3.40 - 10.80 10*3/mm3 Final    RBC 06/06/2024 4.67  4.14 - 5.80 10*6/mm3 Final    Hemoglobin 06/06/2024 14.3  13.0 - 17.7 g/dL Final    Hematocrit 06/06/2024 42.6  37.5 - 51.0 % Final    MCV 06/06/2024 91.2  79.0 - 97.0 fL Final    MCH 06/06/2024 30.6  26.6 - 33.0 pg Final    MCHC 06/06/2024 33.6  31.5 - 35.7 g/dL Final    RDW 06/06/2024 14.3  12.3 - 15.4 % Final    RDW-SD 06/06/2024 47.8  37.0 - 54.0 fl Final    MPV 06/06/2024 11.5  6.0 - 12.0 fL Final    Platelets 06/06/2024 144  140 - 450 10*3/mm3 Final    Neutrophil % 06/06/2024 73.4  42.7 - 76.0 % Final    Lymphocyte % 06/06/2024 14.9 (L)  19.6 - 45.3 % Final    Monocyte % 06/06/2024 7.6  5.0 - 12.0 % Final    Eosinophil % 06/06/2024 2.2  0.3 - 6.2 % Final    Basophil % 06/06/2024 0.9  0.0 - 1.5 % Final    Immature Grans % 06/06/2024 1.0 (H)  0.0 - 0.5 % Final    Neutrophils, Absolute 06/06/2024 5.61  1.70 - 7.00 10*3/mm3 Final    Lymphocytes, Absolute 06/06/2024 1.14  0.70 - 3.10 10*3/mm3 Final    Monocytes, Absolute 06/06/2024 0.58  0.10 - 0.90 10*3/mm3 Final    Eosinophils, Absolute 06/06/2024 0.17  0.00 - 0.40 10*3/mm3 Final    Basophils, Absolute 06/06/2024 0.07  0.00 - 0.20 10*3/mm3 Final    Immature Grans, Absolute 06/06/2024 0.08 (H)  0.00 - 0.05 10*3/mm3 Final    nRBC 06/06/2024 0.0  0.0 - 0.2 /100 WBC Final      No results found.  (  No results found.    ASSESSMENT: The patient is a very pleasant 63 y.o. male  with Merkel cell carcinoma    PLAN:  1. Merkel cell carcinoma:  A.  I reviewed the CAT scan results from 6/6/2024 with the patient, however the official radiology report is not yet available. I did  not see any evidence of relapsed disease, however we will follow up on the radiology read and notify him of results.    B.  I did go over the blood results with the patient from June 6, 2024 and reassured him no evidence of abnormalities.  His blood counts are normal, with stable creatinine, normal liver enzymes, and normal electrolytes.   C. He will continue follow up with dermatology for routine skin exams. He is being followed by Dr. Doe annually.   D. We will see him back in 1 year with repeat labs and imaging studies ordered prior to return. We will do his scans without contrast to limit exposure to contrast dye.     2. Type 2 diabetes:  A. He will continue Toujeo, Novolog, Metformin, and Trulicity for management. His blood sugar on his most recent CMP was 119.    3. Hypertension:  A. He will continue Lotrel and ramipril for hypertension. His blood pressure today was 164/78. This is being managed by his PCP.        FOLLOW UP:1 year with scans and labs    Nori Norton, APRN  6/7/2024

## 2024-06-21 NOTE — PROGRESS NOTES
FOLLOW UP NOTE    PATIENT:                                                      Regino Souza  MEDICAL RECORD #:                        [id number]  :                                                          1960  COMPLETION DATE:    2018  DIAGNOSIS:     Cancer Staging  Merkel cell cancer (CMS/HCC)  Staging form: Merkel Cell Carcinoma, AJCC 8th Edition  - Clinical: Stage Unknown (cTX, cN1, cM0) - Signed by Nori Norton APRN on 2018    BRIEF HISTORY:  Mr. Souza returns to clinic today for follow-up of a Merkel Cell Carcinoma of unknown primary, but metastatic to the left inguinal lymph nodes.  He underwent a lymph node excision followed by 4 cycles of cisplatin and etoposide followed by 50 Gy of radiation therapy to the left groin completed in 2018.  He is recovered well from treatment and his return to work.  He denies any major symptoms, and he reports that his leg swelling has improved.  He was recent seen by his dermatologist who did a full skin exam and identified no concerning lesions. He returns to clinic today after undergoing repeat scans of the chest, abdomen, and pelvis.    MEDICATIONS: Medication reconciliation for the patient was reviewed and confirmed in the electronic medical record.    Review of Systems   Cardiovascular: Positive for leg swelling (slight left ankle).   All other systems reviewed and are negative.      KPS 90%    Physical Exam   Constitutional: He is oriented to person, place, and time. He appears well-developed and well-nourished. No distress.   HENT:   Head: Normocephalic and atraumatic.   Mouth/Throat: Oropharynx is clear and moist.   Eyes: Conjunctivae and EOM are normal. Pupils are equal, round, and reactive to light.   Neck: Normal range of motion. Neck supple.   Cardiovascular: Normal rate and regular rhythm. Exam reveals no friction rub.   No murmur heard.  Pulmonary/Chest: Effort normal and breath sounds normal. He has no wheezes.    Abdominal: Soft. Bowel sounds are normal. He exhibits no distension and no mass. There is no tenderness.   Genitourinary:   Genitourinary Comments: Post-surgical scarring in the left groin.  Incision is intact without nodularity.  There is minimal discoloration or tanning today.  No palpable lymphadenopathy.   Musculoskeletal: Normal range of motion. He exhibits no edema.   Lymphadenopathy:     He has no cervical adenopathy.   Neurological: He is alert and oriented to person, place, and time.   Skin: Skin is warm and dry.   Psychiatric: He has a normal mood and affect. His behavior is normal. Judgment and thought content normal.   Nursing note and vitals reviewed.      VITAL SIGNS:   Vitals:    12/10/18 1515   BP: 129/71   Pulse: 98   Resp: 18   Temp: 97.4 °F (36.3 °C)   TempSrc: Temporal   SpO2: 98%   Weight: 116 kg (255 lb 12.8 oz)   PainSc: 0-No pain     IMAGING  I have personally reviewed the relevant imaging studies, as follows:  Ct Chest With Contrast    Ct Chest With Contrast    Result Date: 9/10/2018  Stable examination with no evidence of metastatic or recurrent disease. There is a tiny 1 cm nodule identified in the celiac axis which is stable and unchanged when compared to the prior study. There is no evidence of progression of disease. No signs of occurrence in the left inguinal region.  D:  09/10/2018 E:  09/10/2018  This report was finalized on 9/10/2018 3:52 PM by Dr. Marina Leal MD.      Ct Abdomen Pelvis With Contrast    Result Date: 9/10/2018  Stable examination with no evidence of metastatic or recurrent disease. There is a tiny 1 cm nodule identified in the celiac axis which is stable and unchanged when compared to the prior study. There is no evidence of progression of disease. No signs of occurrence in the left inguinal region.  D:  09/10/2018 E:  09/10/2018  This report was finalized on 9/10/2018 3:52 PM by Dr. Marina Leal MD.        The following portions of the patient's  history were reviewed and updated as appropriate: allergies, current medications, past family history, past medical history, past social history, past surgical history and problem list.         Regino was seen today for merkel cell.    Diagnoses and all orders for this visit:    Merkel cell cancer (CMS/HCC)    IMPRESSION:  Mr. Souza is a 58-year-old gentleman with a TX N1 M0 Merkel cell carcinoma involving the lymph nodes in the left groin.  He is 5 months out from completion of adjuvant therapy and clinically shows no evidence of recurrent disease.  He is due to see Dr. Waller this coming Friday.  I discussed having Mr. Souza return to my clinic in 3 months with repeat scans.    RECOMMENDATIONS:     Return in about 3 months (around 3/10/2019) for Office Visit, Imaging - See orders.    Vance Rodgers MD    Errors in dictation may reflect use of voice recognition software and not all errors in transcription may have been detected prior to signing.   Negative 21-Jun-2024 06:00

## 2025-06-05 ENCOUNTER — HOSPITAL ENCOUNTER (OUTPATIENT)
Facility: HOSPITAL | Age: 65
Discharge: HOME | End: 2025-06-05
Admitting: NURSE PRACTITIONER
Payer: COMMERCIAL

## 2025-06-05 DIAGNOSIS — C4A.9 MERKEL CELL CANCER: ICD-10-CM

## 2025-06-05 PROCEDURE — 71250 CT THORAX DX C-: CPT

## 2025-06-05 PROCEDURE — 74176 CT ABD & PELVIS W/O CONTRAST: CPT

## 2025-06-06 ENCOUNTER — OFFICE VISIT (OUTPATIENT)
Age: 65
End: 2025-06-06
Payer: COMMERCIAL

## 2025-06-06 VITALS
DIASTOLIC BLOOD PRESSURE: 84 MMHG | WEIGHT: 280 LBS | BODY MASS INDEX: 39.2 KG/M2 | OXYGEN SATURATION: 96 % | TEMPERATURE: 98.3 F | RESPIRATION RATE: 18 BRPM | HEART RATE: 77 BPM | SYSTOLIC BLOOD PRESSURE: 154 MMHG | HEIGHT: 71 IN

## 2025-06-06 DIAGNOSIS — C4A.9 MERKEL CELL CANCER: Primary | ICD-10-CM

## 2025-06-06 PROCEDURE — 99214 OFFICE O/P EST MOD 30 MIN: CPT | Performed by: NURSE PRACTITIONER

## 2025-06-06 RX ORDER — EZETIMIBE 10 MG/1
10 TABLET ORAL DAILY
COMMUNITY

## 2025-06-06 RX ORDER — EPLERENONE 50 MG/1
50 TABLET ORAL DAILY
COMMUNITY
Start: 2025-04-30

## 2025-06-06 NOTE — PROGRESS NOTES
DATE OF VISIT: 6/6/2025    REASON FOR VISIT: Followup for Merkel cell carcinoma    PROBLEM LIST:  1. Merkel cell carcinoma [id number]  A. Presented with left groin mass.   B. Status post left groin excision of 5 cm mass.   C. Final pathology revealed Merkel cell carcinoma.   D. PET scan for clinical staging done 1/17/2018 failed to show evidence of metastatic disease.   E. Started adjuvant chemotherapy with cisplatin and etoposide February 13, 2018, status post 4 cycles, completed April 20, 2018.   F.  Completed adjuvant radiation to the left groin.  2. History of basal cell carcinoma of the skin.   3. Type 2 diabetes.   4. Hypertension.  5. Lymphedema to left leg     HISTORY OF PRESENT ILLNESS: The patient is a very pleasant 64 y.o. 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  Whole body PET scan done on January 17, 2018 did not show any evidence of metastatic disease.  The patient was started on adjuvant chemotherapy with cisplatin and etoposide February 13, 2018.  Completed 4 cycles on April 20, 2018 The patient is here today for scheduled follow-up visit.    SUBJECTIVE: The patient is here today by himself.  He has been doing well since his last visit.  He denies any fevers, chills, or night sweats.  He continues to follow-up with dermatology.  He had a lesion on his left lower leg that was removed that came back benign per the patient's report.  He is still getting annual skin exams.  He has been staying active.  He has follow-up with Dr. Sanchez in the next couple weeks for his annual wellness exam with labs.    Past History:  Medical History: has a past medical history of Ankylosing spondylitis lumbar region, Diabetes mellitus, Disease of thyroid gland, Hypertension, Merkel cell cancer, and Sleep apnea.   Surgical History: has a past surgical history that includes Cataract extraction (Bilateral, 2014); Excessive thigh / hip / buttock / flank skin excision  "(12/16/2017); Colonoscopy (2015); and Other surgical history.   Family History: family history includes Aortic stenosis in his mother; Bone cancer in his paternal uncle; Breast cancer in his sister; Colon cancer in his maternal grandfather; Diabetes in his mother; Heart attack in his father; Lung cancer in his paternal uncle; Melanoma in his father.   Social History: reports that he quit smoking about 20 years ago. His smoking use included cigarettes. He has never used smokeless tobacco. He reports current alcohol use of about 2.0 standard drinks of alcohol per week. He reports that he does not use drugs.    (Not in a hospital admission)     Allergies: Patient has no known allergies.      Review of Systems   Musculoskeletal:  Positive for arthralgias.   Skin:         Skin lesion to left lower leg-removed       PHYSICAL EXAMINATION:   /84   Pulse 77   Temp 98.3 °F (36.8 °C) (Infrared)   Resp 18   Ht 179.1 cm (70.51\")   Wt 127 kg (280 lb)   SpO2 96%   BMI 39.59 kg/m²    Pain Score    06/06/25 0903   PainSc: 0-No pain            ECOG Performance Status: 0 - Asymptomatic  General Appearance:  alert, cooperative, no apparent distress and appears stated age   Lungs:   Clear to auscultation bilaterally; respirations regular, even, and unlabored bilaterally   Heart:  Regular rate and rhythm, no murmurs appreciated   Abdomen:   Soft, non-tender, non-distended, and no organomegaly     No visits with results within 2 Week(s) from this visit.   Latest known visit with results is:   Lab on 06/06/2024   Component Date Value Ref Range Status    Glucose 06/06/2024 119 (H)  65 - 99 mg/dL Final    BUN 06/06/2024 20  8 - 23 mg/dL Final    Creatinine 06/06/2024 1.04  0.76 - 1.27 mg/dL Final    Sodium 06/06/2024 141  136 - 145 mmol/L Final    Potassium 06/06/2024 4.2  3.5 - 5.2 mmol/L Final    Chloride 06/06/2024 100  98 - 107 mmol/L Final    CO2 06/06/2024 32.0 (H)  22.0 - 29.0 mmol/L Final    Calcium 06/06/2024 9.1  8.6 - " 10.5 mg/dL Final    Total Protein 06/06/2024 7.0  6.0 - 8.5 g/dL Final    Albumin 06/06/2024 4.5  3.5 - 5.2 g/dL Final    ALT (SGPT) 06/06/2024 20  1 - 41 U/L Final    AST (SGOT) 06/06/2024 16  1 - 40 U/L Final    Alkaline Phosphatase 06/06/2024 67  39 - 117 U/L Final    Total Bilirubin 06/06/2024 0.8  0.0 - 1.2 mg/dL Final    Globulin 06/06/2024 2.5  gm/dL Final    Calculated Result    A/G Ratio 06/06/2024 1.8  g/dL Final    BUN/Creatinine Ratio 06/06/2024 19.2  7.0 - 25.0 Final    Anion Gap 06/06/2024 9.0  5.0 - 15.0 mmol/L Final    eGFR 06/06/2024 80.7  >60.0 mL/min/1.73 Final    WBC 06/06/2024 7.65  3.40 - 10.80 10*3/mm3 Final    RBC 06/06/2024 4.67  4.14 - 5.80 10*6/mm3 Final    Hemoglobin 06/06/2024 14.3  13.0 - 17.7 g/dL Final    Hematocrit 06/06/2024 42.6  37.5 - 51.0 % Final    MCV 06/06/2024 91.2  79.0 - 97.0 fL Final    MCH 06/06/2024 30.6  26.6 - 33.0 pg Final    MCHC 06/06/2024 33.6  31.5 - 35.7 g/dL Final    RDW 06/06/2024 14.3  12.3 - 15.4 % Final    RDW-SD 06/06/2024 47.8  37.0 - 54.0 fl Final    MPV 06/06/2024 11.5  6.0 - 12.0 fL Final    Platelets 06/06/2024 144  140 - 450 10*3/mm3 Final    Neutrophil % 06/06/2024 73.4  42.7 - 76.0 % Final    Lymphocyte % 06/06/2024 14.9 (L)  19.6 - 45.3 % Final    Monocyte % 06/06/2024 7.6  5.0 - 12.0 % Final    Eosinophil % 06/06/2024 2.2  0.3 - 6.2 % Final    Basophil % 06/06/2024 0.9  0.0 - 1.5 % Final    Immature Grans % 06/06/2024 1.0 (H)  0.0 - 0.5 % Final    Neutrophils, Absolute 06/06/2024 5.61  1.70 - 7.00 10*3/mm3 Final    Lymphocytes, Absolute 06/06/2024 1.14  0.70 - 3.10 10*3/mm3 Final    Monocytes, Absolute 06/06/2024 0.58  0.10 - 0.90 10*3/mm3 Final    Eosinophils, Absolute 06/06/2024 0.17  0.00 - 0.40 10*3/mm3 Final    Basophils, Absolute 06/06/2024 0.07  0.00 - 0.20 10*3/mm3 Final    Immature Grans, Absolute 06/06/2024 0.08 (H)  0.00 - 0.05 10*3/mm3 Final    nRBC 06/06/2024 0.0  0.0 - 0.2 /100 WBC Final      CT Chest Without Contrast  Diagnostic  Result Date: 6/5/2025  Narrative: CT CHEST WO CONTRAST DIAGNOSTIC, CT ABDOMEN PELVIS WO CONTRAST Date of Exam: 6/5/2025 8:28 AM EDT Indication: restaging Merkel cell carcinoma. Comparison: 6/6/2024 Technique: Axial CT images were obtained of the chest abdomen and pelvis without contrast administration.  Reconstructed coronal and sagittal images were also obtained. Automated exposure control and iterative construction methods were used. Findings: CT CHEST: MEDIASTINUM: Similar thyroid enlargement. Aortic and heart size are normal. No mass identified. There is a small anterior pericardial effusion, unchanged. CORONARY ARTERIES: There is calcified atherosclerotic disease. LUNGS: Lungs are clear. No consolidation. No significant nodule nor interstitial changes. There is a calcified left upper lobe granuloma. PLEURAL SPACE: No effusion, mass, nor pneumothorax. LYMPH NODES: No pathologically enlarged thoracic nodes. There is bilateral benign gynecomastia, similar to prior examination. CT ABDOMEN AND PELVIS:  LIVER:  Unremarkable parenchyma without focal lesion. BILIARY/GALLBLADDER: There is a small calcified gallstone. There are no gallbladder inflammatory changes. SPLEEN:  Unremarkable PANCREAS:  Unremarkable ADRENAL: Unchanged with right adrenal myelolipomas and left adrenal hyperplasia KIDNEYS:  Unremarkable parenchyma with no solid mass identified. No obstruction.  No calculus identified. GASTROINTESTINAL/MESENTERY:  No evidence of obstruction nor inflammation. The appendix is normal. AORTA/IVC:  Normal caliber. RETROPERITONEUM/LYMPH NODES:  Unremarkable REPRODUCTIVE:  Unremarkable BLADDER:  Unremarkable OSSEUS STRUCTURES: No acute process identified. Similar findings of ankylosing spondylitis. Similar surgical clips in the left inguinal region. There is a small fat-containing left paraumbilical hernia, unchanged.     Impression: Impression: 1.Stable examination without evidence of disease recurrence.  Electronically Signed: Sonny Dozier MD  6/5/2025 10:12 AM EDT  Workstation ID: [id number]    CT Abdomen Pelvis Without Contrast  Result Date: 6/5/2025  Narrative: CT CHEST WO CONTRAST DIAGNOSTIC, CT ABDOMEN PELVIS WO CONTRAST Date of Exam: 6/5/2025 8:28 AM EDT Indication: restaging Merkel cell carcinoma. Comparison: 6/6/2024 Technique: Axial CT images were obtained of the chest abdomen and pelvis without contrast administration.  Reconstructed coronal and sagittal images were also obtained. Automated exposure control and iterative construction methods were used. Findings: CT CHEST: MEDIASTINUM: Similar thyroid enlargement. Aortic and heart size are normal. No mass identified. There is a small anterior pericardial effusion, unchanged. CORONARY ARTERIES: There is calcified atherosclerotic disease. LUNGS: Lungs are clear. No consolidation. No significant nodule nor interstitial changes. There is a calcified left upper lobe granuloma. PLEURAL SPACE: No effusion, mass, nor pneumothorax. LYMPH NODES: No pathologically enlarged thoracic nodes. There is bilateral benign gynecomastia, similar to prior examination. CT ABDOMEN AND PELVIS:  LIVER:  Unremarkable parenchyma without focal lesion. BILIARY/GALLBLADDER: There is a small calcified gallstone. There are no gallbladder inflammatory changes. SPLEEN:  Unremarkable PANCREAS:  Unremarkable ADRENAL: Unchanged with right adrenal myelolipomas and left adrenal hyperplasia KIDNEYS:  Unremarkable parenchyma with no solid mass identified. No obstruction.  No calculus identified. GASTROINTESTINAL/MESENTERY:  No evidence of obstruction nor inflammation. The appendix is normal. AORTA/IVC:  Normal caliber. RETROPERITONEUM/LYMPH NODES:  Unremarkable REPRODUCTIVE:  Unremarkable BLADDER:  Unremarkable OSSEUS STRUCTURES: No acute process identified. Similar findings of ankylosing spondylitis. Similar surgical clips in the left inguinal region. There is a small fat-containing left  paraumbilical hernia, unchanged.     Impression: Impression: 1.Stable examination without evidence of disease recurrence. Electronically Signed: Sonny Dozier MD  6/5/2025 10:12 AM EDT  Workstation ID: [id number]    (  CT Chest Without Contrast Diagnostic  Result Date: 6/5/2025  Narrative: CT CHEST WO CONTRAST DIAGNOSTIC, CT ABDOMEN PELVIS WO CONTRAST Date of Exam: 6/5/2025 8:28 AM EDT Indication: restaging Merkel cell carcinoma. Comparison: 6/6/2024 Technique: Axial CT images were obtained of the chest abdomen and pelvis without contrast administration.  Reconstructed coronal and sagittal images were also obtained. Automated exposure control and iterative construction methods were used. Findings: CT CHEST: MEDIASTINUM: Similar thyroid enlargement. Aortic and heart size are normal. No mass identified. There is a small anterior pericardial effusion, unchanged. CORONARY ARTERIES: There is calcified atherosclerotic disease. LUNGS: Lungs are clear. No consolidation. No significant nodule nor interstitial changes. There is a calcified left upper lobe granuloma. PLEURAL SPACE: No effusion, mass, nor pneumothorax. LYMPH NODES: No pathologically enlarged thoracic nodes. There is bilateral benign gynecomastia, similar to prior examination. CT ABDOMEN AND PELVIS:  LIVER:  Unremarkable parenchyma without focal lesion. BILIARY/GALLBLADDER: There is a small calcified gallstone. There are no gallbladder inflammatory changes. SPLEEN:  Unremarkable PANCREAS:  Unremarkable ADRENAL: Unchanged with right adrenal myelolipomas and left adrenal hyperplasia KIDNEYS:  Unremarkable parenchyma with no solid mass identified. No obstruction.  No calculus identified. GASTROINTESTINAL/MESENTERY:  No evidence of obstruction nor inflammation. The appendix is normal. AORTA/IVC:  Normal caliber. RETROPERITONEUM/LYMPH NODES:  Unremarkable REPRODUCTIVE:  Unremarkable BLADDER:  Unremarkable OSSEUS STRUCTURES: No acute process identified. Similar findings  of ankylosing spondylitis. Similar surgical clips in the left inguinal region. There is a small fat-containing left paraumbilical hernia, unchanged.     Impression: Impression: 1.Stable examination without evidence of disease recurrence. Electronically Signed: Sonny Dozier MD  6/5/2025 10:12 AM EDT  Workstation ID: [id number]    CT Abdomen Pelvis Without Contrast  Result Date: 6/5/2025  Narrative: CT CHEST WO CONTRAST DIAGNOSTIC, CT ABDOMEN PELVIS WO CONTRAST Date of Exam: 6/5/2025 8:28 AM EDT Indication: restaging Merkel cell carcinoma. Comparison: 6/6/2024 Technique: Axial CT images were obtained of the chest abdomen and pelvis without contrast administration.  Reconstructed coronal and sagittal images were also obtained. Automated exposure control and iterative construction methods were used. Findings: CT CHEST: MEDIASTINUM: Similar thyroid enlargement. Aortic and heart size are normal. No mass identified. There is a small anterior pericardial effusion, unchanged. CORONARY ARTERIES: There is calcified atherosclerotic disease. LUNGS: Lungs are clear. No consolidation. No significant nodule nor interstitial changes. There is a calcified left upper lobe granuloma. PLEURAL SPACE: No effusion, mass, nor pneumothorax. LYMPH NODES: No pathologically enlarged thoracic nodes. There is bilateral benign gynecomastia, similar to prior examination. CT ABDOMEN AND PELVIS:  LIVER:  Unremarkable parenchyma without focal lesion. BILIARY/GALLBLADDER: There is a small calcified gallstone. There are no gallbladder inflammatory changes. SPLEEN:  Unremarkable PANCREAS:  Unremarkable ADRENAL: Unchanged with right adrenal myelolipomas and left adrenal hyperplasia KIDNEYS:  Unremarkable parenchyma with no solid mass identified. No obstruction.  No calculus identified. GASTROINTESTINAL/MESENTERY:  No evidence of obstruction nor inflammation. The appendix is normal. AORTA/IVC:  Normal caliber. RETROPERITONEUM/LYMPH NODES:  Unremarkable  REPRODUCTIVE:  Unremarkable BLADDER:  Unremarkable OSSEUS STRUCTURES: No acute process identified. Similar findings of ankylosing spondylitis. Similar surgical clips in the left inguinal region. There is a small fat-containing left paraumbilical hernia, unchanged.     Impression: Impression: 1.Stable examination without evidence of disease recurrence. Electronically Signed: Sonny Dozier MD  6/5/2025 10:12 AM EDT  Workstation ID: [id number]      ASSESSMENT: The patient is a very pleasant 64 y.o. male  with Merkel cell carcinoma    PLAN:  1. Merkel cell carcinoma:  A.  I reviewed the CAT scan results from 6/5/2025 with the patient.  I reassured him he has stable findings without evidence of new or relapsed disease.  B.  The patient is seeing his primary care doctor in a couple weeks for wellness exam including comprehensive labs.  Will defer doing additional labs today.  C. He will continue follow up with dermatology for routine skin exams. He is being followed by Dr. Doe annually.   D. We will see him back in 1 year with repeat labs and imaging studies ordered prior to return. We will do his scans without contrast to limit exposure to contrast dye.     2. Type 2 diabetes:  A. He will continue Toujeo, Novolog, Metformin, and Trulicity for management. His blood sugar on his most recent CMP was 119.    3. Hypertension:  A. He will continue Norvasc and ramipril for hypertension. His blood pressure today was 154/84. This is being managed by his PCP.        FOLLOW UP:1 year with scans and labs    RITA Rose  6/6/2025